# Patient Record
Sex: MALE | Race: WHITE | NOT HISPANIC OR LATINO | Employment: UNEMPLOYED | URBAN - NONMETROPOLITAN AREA
[De-identification: names, ages, dates, MRNs, and addresses within clinical notes are randomized per-mention and may not be internally consistent; named-entity substitution may affect disease eponyms.]

---

## 2021-01-15 ENCOUNTER — HOSPITAL ENCOUNTER (EMERGENCY)
Facility: HOSPITAL | Age: 36
Discharge: HOME | End: 2021-01-15
Attending: EMERGENCY MEDICINE
Payer: COMMERCIAL

## 2021-01-15 ENCOUNTER — APPOINTMENT (EMERGENCY)
Dept: RADIOLOGY | Facility: HOSPITAL | Age: 36
End: 2021-01-15
Payer: COMMERCIAL

## 2021-01-15 VITALS
HEART RATE: 69 BPM | TEMPERATURE: 98.6 F | WEIGHT: 192.02 LBS | SYSTOLIC BLOOD PRESSURE: 137 MMHG | RESPIRATION RATE: 18 BRPM | OXYGEN SATURATION: 96 % | DIASTOLIC BLOOD PRESSURE: 77 MMHG

## 2021-01-15 DIAGNOSIS — R56.9 SEIZURE (HCC): ICD-10-CM

## 2021-01-15 DIAGNOSIS — M54.6 THORACIC BACK PAIN, UNSPECIFIED BACK PAIN LATERALITY, UNSPECIFIED CHRONICITY: Primary | ICD-10-CM

## 2021-01-15 LAB
ALBUMIN SERPL BCP-MCNC: 4.1 G/DL (ref 3.5–5)
ALP SERPL-CCNC: 95 U/L (ref 46–116)
ALT SERPL W P-5'-P-CCNC: 30 U/L (ref 12–78)
AMPHETAMINES SERPL QL SCN: NEGATIVE
ANION GAP SERPL CALCULATED.3IONS-SCNC: 7 MMOL/L (ref 4–13)
AST SERPL W P-5'-P-CCNC: 20 U/L (ref 5–45)
BACTERIA UR QL AUTO: NORMAL /HPF
BARBITURATES UR QL: NEGATIVE
BASOPHILS # BLD AUTO: 0.1 THOUSANDS/ΜL (ref 0–0.1)
BASOPHILS NFR BLD AUTO: 1 % (ref 0–1)
BENZODIAZ UR QL: NEGATIVE
BILIRUB SERPL-MCNC: 0.54 MG/DL (ref 0.2–1)
BILIRUB UR QL STRIP: NEGATIVE
BUN SERPL-MCNC: 15 MG/DL (ref 5–25)
CALCIUM SERPL-MCNC: 8.9 MG/DL (ref 8.3–10.1)
CHLORIDE SERPL-SCNC: 101 MMOL/L (ref 100–108)
CLARITY UR: CLEAR
CO2 SERPL-SCNC: 30 MMOL/L (ref 21–32)
COCAINE UR QL: NEGATIVE
COLOR UR: YELLOW
CREAT SERPL-MCNC: 1.14 MG/DL (ref 0.6–1.3)
EOSINOPHIL # BLD AUTO: 0.16 THOUSAND/ΜL (ref 0–0.61)
EOSINOPHIL NFR BLD AUTO: 1 % (ref 0–6)
ERYTHROCYTE [DISTWIDTH] IN BLOOD BY AUTOMATED COUNT: 12.4 % (ref 11.6–15.1)
ETHANOL SERPL-MCNC: <3 MG/DL (ref 0–3)
GFR SERPL CREATININE-BSD FRML MDRD: 82 ML/MIN/1.73SQ M
GLUCOSE SERPL-MCNC: 88 MG/DL (ref 65–140)
GLUCOSE UR STRIP-MCNC: NEGATIVE MG/DL
HCT VFR BLD AUTO: 41.3 % (ref 36.5–49.3)
HGB BLD-MCNC: 14 G/DL (ref 12–17)
HGB UR QL STRIP.AUTO: ABNORMAL
IMM GRANULOCYTES # BLD AUTO: 0.04 THOUSAND/UL (ref 0–0.2)
IMM GRANULOCYTES NFR BLD AUTO: 0 % (ref 0–2)
KETONES UR STRIP-MCNC: NEGATIVE MG/DL
LEUKOCYTE ESTERASE UR QL STRIP: NEGATIVE
LYMPHOCYTES # BLD AUTO: 1.54 THOUSANDS/ΜL (ref 0.6–4.47)
LYMPHOCYTES NFR BLD AUTO: 13 % (ref 14–44)
MCH RBC QN AUTO: 29.5 PG (ref 26.8–34.3)
MCHC RBC AUTO-ENTMCNC: 33.9 G/DL (ref 31.4–37.4)
MCV RBC AUTO: 87 FL (ref 82–98)
METHADONE UR QL: NEGATIVE
MONOCYTES # BLD AUTO: 1.26 THOUSAND/ΜL (ref 0.17–1.22)
MONOCYTES NFR BLD AUTO: 10 % (ref 4–12)
NEUTROPHILS # BLD AUTO: 9.04 THOUSANDS/ΜL (ref 1.85–7.62)
NEUTS SEG NFR BLD AUTO: 75 % (ref 43–75)
NITRITE UR QL STRIP: NEGATIVE
NON-SQ EPI CELLS URNS QL MICRO: NORMAL /HPF
NRBC BLD AUTO-RTO: 0 /100 WBCS
OPIATES UR QL SCN: NEGATIVE
OXYCODONE+OXYMORPHONE UR QL SCN: NEGATIVE
PCP UR QL: NEGATIVE
PH UR STRIP.AUTO: 6.5 [PH]
PLATELET # BLD AUTO: 243 THOUSANDS/UL (ref 149–390)
PMV BLD AUTO: 9.5 FL (ref 8.9–12.7)
POTASSIUM SERPL-SCNC: 3.8 MMOL/L (ref 3.5–5.3)
PROT SERPL-MCNC: 7.4 G/DL (ref 6.4–8.2)
PROT UR STRIP-MCNC: NEGATIVE MG/DL
RBC # BLD AUTO: 4.74 MILLION/UL (ref 3.88–5.62)
RBC #/AREA URNS AUTO: NORMAL /HPF
SODIUM SERPL-SCNC: 138 MMOL/L (ref 136–145)
SP GR UR STRIP.AUTO: 1.01 (ref 1–1.03)
THC UR QL: POSITIVE
UROBILINOGEN UR QL STRIP.AUTO: 0.2 E.U./DL
WBC # BLD AUTO: 12.14 THOUSAND/UL (ref 4.31–10.16)
WBC #/AREA URNS AUTO: NORMAL /HPF

## 2021-01-15 PROCEDURE — 81001 URINALYSIS AUTO W/SCOPE: CPT | Performed by: PHYSICIAN ASSISTANT

## 2021-01-15 PROCEDURE — 80307 DRUG TEST PRSMV CHEM ANLYZR: CPT | Performed by: PHYSICIAN ASSISTANT

## 2021-01-15 PROCEDURE — 72100 X-RAY EXAM L-S SPINE 2/3 VWS: CPT

## 2021-01-15 PROCEDURE — 96365 THER/PROPH/DIAG IV INF INIT: CPT

## 2021-01-15 PROCEDURE — 96375 TX/PRO/DX INJ NEW DRUG ADDON: CPT

## 2021-01-15 PROCEDURE — 36415 COLL VENOUS BLD VENIPUNCTURE: CPT | Performed by: PHYSICIAN ASSISTANT

## 2021-01-15 PROCEDURE — 80053 COMPREHEN METABOLIC PANEL: CPT | Performed by: PHYSICIAN ASSISTANT

## 2021-01-15 PROCEDURE — 85025 COMPLETE CBC W/AUTO DIFF WBC: CPT | Performed by: PHYSICIAN ASSISTANT

## 2021-01-15 PROCEDURE — 72072 X-RAY EXAM THORAC SPINE 3VWS: CPT

## 2021-01-15 PROCEDURE — 82948 REAGENT STRIP/BLOOD GLUCOSE: CPT

## 2021-01-15 PROCEDURE — 99284 EMERGENCY DEPT VISIT MOD MDM: CPT

## 2021-01-15 PROCEDURE — 93005 ELECTROCARDIOGRAM TRACING: CPT

## 2021-01-15 PROCEDURE — 99285 EMERGENCY DEPT VISIT HI MDM: CPT | Performed by: PHYSICIAN ASSISTANT

## 2021-01-15 PROCEDURE — 82077 ASSAY SPEC XCP UR&BREATH IA: CPT | Performed by: PHYSICIAN ASSISTANT

## 2021-01-15 RX ORDER — LEVETIRACETAM 500 MG/1
500 TABLET ORAL EVERY 12 HOURS SCHEDULED
Qty: 60 TABLET | Refills: 0 | Status: SHIPPED | OUTPATIENT
Start: 2021-01-15 | End: 2021-02-24 | Stop reason: SDUPTHER

## 2021-01-15 RX ORDER — PREDNISONE 20 MG/1
40 TABLET ORAL ONCE
Status: COMPLETED | OUTPATIENT
Start: 2021-01-15 | End: 2021-01-15

## 2021-01-15 RX ORDER — KETOROLAC TROMETHAMINE 30 MG/ML
15 INJECTION, SOLUTION INTRAMUSCULAR; INTRAVENOUS ONCE
Status: COMPLETED | OUTPATIENT
Start: 2021-01-15 | End: 2021-01-15

## 2021-01-15 RX ORDER — DIAZEPAM 5 MG/1
5 TABLET ORAL ONCE
Status: DISCONTINUED | OUTPATIENT
Start: 2021-01-15 | End: 2021-01-16 | Stop reason: HOSPADM

## 2021-01-15 RX ORDER — METHYLPREDNISOLONE 4 MG/1
TABLET ORAL
Qty: 21 TABLET | Refills: 0 | Status: SHIPPED | OUTPATIENT
Start: 2021-01-15 | End: 2021-01-29 | Stop reason: ALTCHOICE

## 2021-01-15 RX ADMIN — LEVETIRACETAM 1000 MG: 500 INJECTION, SOLUTION, CONCENTRATE INTRAVENOUS at 21:43

## 2021-01-15 RX ADMIN — KETOROLAC TROMETHAMINE 15 MG: 30 INJECTION, SOLUTION INTRAMUSCULAR at 19:56

## 2021-01-15 RX ADMIN — PREDNISONE 40 MG: 20 TABLET ORAL at 22:13

## 2021-01-15 NOTE — Clinical Note
Estefania Shafer was seen and treated in our emergency department on 1/15/2021  Diagnosis:     Pj  may return to work on return date  He may return on this date: 01/22/2021         If you have any questions or concerns, please don't hesitate to call        Ann Desai, DO    ______________________________           _______________          _______________  Hospital Representative                              Date                                Time

## 2021-01-16 ENCOUNTER — TELEPHONE (OUTPATIENT)
Dept: EMERGENCY DEPT | Facility: HOSPITAL | Age: 36
End: 2021-01-16

## 2021-01-16 DIAGNOSIS — M54.6 THORACIC BACK PAIN, UNSPECIFIED BACK PAIN LATERALITY, UNSPECIFIED CHRONICITY: Primary | ICD-10-CM

## 2021-01-16 LAB — GLUCOSE SERPL-MCNC: 76 MG/DL (ref 65–140)

## 2021-01-16 NOTE — TELEPHONE ENCOUNTER
Patient returned phone call regarding thoracic spine x-rays  We discussed results and findings  MRI was recommended for follow-up  Will place orthopedic referral to assure patient gets appropriate follow-up  Patient was educated on all results and findings  He verbalized understanding  He agreed with this treatment plan

## 2021-01-16 NOTE — ED PROVIDER NOTES
History  Chief Complaint   Patient presents with    Back Pain     pt states back pain after seizure today  states hx of same and has been following up but pain increased after seizure today  pt denies hitting head during seizure, witnessed by family     18-year-old male presents the emergency department for evaluation of back pain  Patient states he 1st had a seizure in July reports he was seen in the hospital after this incident and was told it was likely due to dehydration  Patient states he had extreme back pain after this and has been following with family doctor reports he has had x-rays which were negative for acute findings and he has been referred to a rheumatologist whom he is seeing next week  Patient states while napping on the sofa today his children said he was shaking in his sleep  Patient concerned he had a seizure in his sleep  Reports he did bite his tongue during this time and reports his tongue was bleeding when he woke up  Denies falling off the sofa  Denies any head injury  Patient denies any drug or alcohol use  Patient denies any midline back tenderness reports pain is on the left side  He denies pain radiating into the leg  He denies any weakness, numbness, paresthesias  Patient denies any loss of bowel or bladder control  Patient denies any fevers or chills  He denies any IV drug use  History provided by:  Patient  Back Pain  Pain location: Left-sided    Quality:  Shooting  Radiates to:  Does not radiate  Pain severity:  Severe  Progression:  Worsening  Chronicity:  New  Context comment:  Suspected seizure  Ineffective treatments:  None tried  Associated symptoms: no abdominal pain, no abdominal swelling, no bladder incontinence, no bowel incontinence, no chest pain, no dysuria, no fever, no headaches, no leg pain, no numbness, no paresthesias, no pelvic pain, no perianal numbness, no tingling, no weakness and no weight loss        None       Past Medical History: Diagnosis Date    Migraine        History reviewed  No pertinent surgical history  History reviewed  No pertinent family history  I have reviewed and agree with the history as documented  E-Cigarette/Vaping    E-Cigarette Use Never User      E-Cigarette/Vaping Substances     Social History     Tobacco Use    Smoking status: Never Smoker    Smokeless tobacco: Never Used   Substance Use Topics    Alcohol use: Yes     Frequency: Monthly or less     Drinks per session: 1 or 2    Drug use: Yes     Types: Marijuana       Review of Systems   Constitutional: Negative  Negative for appetite change, chills, diaphoresis, fatigue, fever and weight loss  HENT: Negative  Eyes: Negative for visual disturbance  Respiratory: Negative  Negative for cough, choking, chest tightness, shortness of breath, wheezing and stridor  Cardiovascular: Negative for chest pain  Gastrointestinal: Negative for abdominal pain and bowel incontinence  Genitourinary: Negative for bladder incontinence, dysuria and pelvic pain  Musculoskeletal: Positive for back pain  Negative for arthralgias, gait problem, joint swelling, myalgias, neck pain and neck stiffness  Skin: Negative  Negative for color change, pallor, rash and wound  Neurological: Positive for seizures  Negative for dizziness, tingling, tremors, syncope, facial asymmetry, speech difficulty, weakness, light-headedness, numbness, headaches and paresthesias  Psychiatric/Behavioral: Negative  All other systems reviewed and are negative  Physical Exam  Physical Exam  Vitals signs and nursing note reviewed  Constitutional:       General: He is not in acute distress  Appearance: Normal appearance  He is normal weight  He is not ill-appearing, toxic-appearing or diaphoretic  HENT:      Head: Normocephalic and atraumatic        Right Ear: Tympanic membrane, ear canal and external ear normal       Left Ear: Tympanic membrane, ear canal and external ear normal       Nose: Nose normal  No congestion or rhinorrhea  Mouth/Throat:      Mouth: Mucous membranes are moist       Pharynx: Oropharynx is clear  No oropharyngeal exudate or posterior oropharyngeal erythema  Comments: Small wound to the left side of the tongue  Eyes:      Extraocular Movements: Extraocular movements intact  Conjunctiva/sclera: Conjunctivae normal       Pupils: Pupils are equal, round, and reactive to light  Neck:      Musculoskeletal: Normal range of motion and neck supple  No muscular tenderness  Cardiovascular:      Rate and Rhythm: Normal rate and regular rhythm  Heart sounds: No murmur  Pulmonary:      Effort: Pulmonary effort is normal  No respiratory distress  Breath sounds: Normal breath sounds  No stridor  No wheezing, rhonchi or rales  Chest:      Chest wall: No tenderness  Abdominal:      General: Abdomen is flat  Bowel sounds are normal  There is no distension  Palpations: Abdomen is soft  Tenderness: There is no abdominal tenderness  There is no right CVA tenderness, left CVA tenderness or guarding  Musculoskeletal: Normal range of motion  General: No swelling, tenderness or deformity  Right lower leg: No edema  Left lower leg: No edema  Skin:     General: Skin is warm and dry  Capillary Refill: Capillary refill takes less than 2 seconds  Neurological:      General: No focal deficit present  Mental Status: He is alert and oriented to person, place, and time  GCS: GCS eye subscore is 4  GCS verbal subscore is 5  GCS motor subscore is 6  Cranial Nerves: Cranial nerves are intact  Sensory: Sensation is intact  Motor: Motor function is intact  No weakness, tremor, atrophy or pronator drift  Coordination: Coordination is intact  Finger-Nose-Finger Test normal       Gait: Gait is intact     Psychiatric:         Mood and Affect: Mood normal          Behavior: Behavior normal  Thought Content:  Thought content normal          Judgment: Judgment normal          Vital Signs  ED Triage Vitals   Temperature Pulse Respirations Blood Pressure SpO2   01/15/21 1916 01/15/21 1916 01/15/21 1916 01/15/21 1916 01/15/21 1916   98 6 °F (37 °C) 74 16 136/79 95 %      Temp Source Heart Rate Source Patient Position - Orthostatic VS BP Location FiO2 (%)   01/15/21 1916 01/15/21 1916 01/15/21 1916 01/15/21 1916 --   Temporal Monitor Lying Right arm       Pain Score       01/15/21 1956       7           Vitals:    01/15/21 1916   BP: 136/79   Pulse: 74   Patient Position - Orthostatic VS: Lying         Visual Acuity      ED Medications  Medications   diazepam (VALIUM) tablet 5 mg (5 mg Oral Not Given 1/15/21 1955)   ketorolac (TORADOL) injection 15 mg (15 mg Intravenous Given 1/15/21 1956)       Diagnostic Studies  Results Reviewed     Procedure Component Value Units Date/Time    UA (URINE) with reflex to Scope [[id number]]     Lab Status: No result Specimen: Urine     Ethanol [[id number]]  (Normal) Collected: 01/15/21 1955    Lab Status: Final result Specimen: Blood from Arm, Left Updated: 01/15/21 2013     Ethanol Lvl <3 mg/dL     CBC and differential [[id number]]  (Abnormal) Collected: 01/15/21 1955    Lab Status: Final result Specimen: Blood from Arm, Left Updated: 01/15/21 2003     WBC 12 14 Thousand/uL      RBC 4 74 Million/uL      Hemoglobin 14 0 g/dL      Hematocrit 41 3 %      MCV 87 fL      MCH 29 5 pg      MCHC 33 9 g/dL      RDW 12 4 %      MPV 9 5 fL      Platelets 296 Thousands/uL      nRBC 0 /100 WBCs      Neutrophils Relative 75 %      Immat GRANS % 0 %      Lymphocytes Relative 13 %      Monocytes Relative 10 %      Eosinophils Relative 1 %      Basophils Relative 1 %      Neutrophils Absolute 9 04 Thousands/µL      Immature Grans Absolute 0 04 Thousand/uL      Lymphocytes Absolute 1 54 Thousands/µL      Monocytes Absolute 1 26 Thousand/µL      Eosinophils Absolute 0 16 Thousand/µL Basophils Absolute 0 10 Thousands/µL     Comprehensive metabolic panel [[id number]] Collected: 01/15/21 1955    Lab Status: In process Specimen: Blood from Arm, Left Updated: 01/15/21 2000    Rapid drug screen, urine [[id number]]     Lab Status: No result Specimen: Urine                  No orders to display              Procedures  ECG 12 Lead Documentation Only    Date/Time: 1/15/2021 8:06 PM  Performed by: Nelda Jefferson PA-C  Authorized by: Nelda Jefferson PA-C     Indications / Diagnosis:  Seizure  Patient location:  ED  Interpretation:     Interpretation: non-specific    Rate:     ECG rate:  66    ECG rate assessment: normal    Rhythm:     Rhythm: sinus rhythm    Ectopy:     Ectopy: none    QRS:     QRS axis:  Normal    QRS intervals:  Normal  Conduction:     Conduction: normal    ST segments:     ST segments:  Normal             ED Course  ED Course as of David 15 2016   Fri David 15, 2021   2005 Noted leukocytosis   WBC(!): 12 14   2013 Ethanol negative      2013 Treating patient's back pain with Valium and Toradol  Patient signed out to Dr Franklin Ross pending remainder of laboratory findings              MDM  Number of Diagnoses or Management Options  Diagnosis management comments: 48-year-old male presents emergency department for evaluation of left-sided back pain after suspected seizure today  Vitals and medical record reviewed  On exam patient had left-sided back tenderness  No red flag symptoms  No loss of bowel or bladder control, fevers, chills  Patient denies any IV drug use  Laboratory findings so far show mild leukocytosis  Ethanol negative    I discussed and reviewed chart with Dr Jaren Brar who will take over patient's care pending remainder of laboratory findings       Amount and/or Complexity of Data Reviewed  Clinical lab tests: ordered and reviewed        Disposition  Final diagnoses:   None     ED Disposition     None      Follow-up Information    None         Patient's Medications    No medications on file     No discharge procedures on file      PDMP Review     None          ED Provider  Electronically Signed by           Seema Sandoval PA-C  01/15/21 2016

## 2021-01-17 NOTE — ED ATTENDING ATTESTATION
1/15/2021  INitza DO, saw and evaluated the patient  I have discussed the patient with the resident/non-physician practitioner and agree with the resident's/non-physician practitioner's findings, Plan of Care, and MDM as documented in the resident's/non-physician practitioner's note, except where noted  All available labs and Radiology studies were reviewed  I was present for key portions of any procedure(s) performed by the resident/non-physician practitioner and I was immediately available to provide assistance  At this point I agree with the current assessment done in the Emergency Department    I have conducted an independent evaluation of this patient a history and physical is as follows:    ED Course  ED Course as of David 16 2158   Fri David 15, 2021   2147 Notified by nursing staff that patient declined Valium        Patient complaint is back pain, imaging findings consistent with degenerative changes as well as old appearing compression fractures in the midthoracic region, he has a initial evaluation with rheumatology which was referred from his PCP, however think patient would benefit from follow-up with pain management/spine and possible Neurology secondary to possible seizure activity today, he was agreeable to starting a course of [address] for treatment of possible seizures and prednisone for treatment of back pain, he was advised to follow back up with his primary care provider or return if symptoms worsen, patient acknowledges understanding and agreement with this plan

## 2021-01-19 RX ORDER — DIAZEPAM 5 MG/1
5 TABLET ORAL
COMMUNITY
Start: 2020-12-05 | End: 2021-01-29 | Stop reason: ALTCHOICE

## 2021-01-20 ENCOUNTER — CONSULT (OUTPATIENT)
Dept: PAIN MEDICINE | Facility: CLINIC | Age: 36
End: 2021-01-20
Payer: COMMERCIAL

## 2021-01-20 VITALS
RESPIRATION RATE: 20 BRPM | SYSTOLIC BLOOD PRESSURE: 120 MMHG | HEIGHT: 68 IN | TEMPERATURE: 97.5 F | DIASTOLIC BLOOD PRESSURE: 70 MMHG | HEART RATE: 63 BPM | BODY MASS INDEX: 27.58 KG/M2 | WEIGHT: 182 LBS

## 2021-01-20 DIAGNOSIS — M54.6 THORACIC BACK PAIN, UNSPECIFIED BACK PAIN LATERALITY, UNSPECIFIED CHRONICITY: ICD-10-CM

## 2021-01-20 DIAGNOSIS — S22.000G THORACIC COMPRESSION FRACTURE, WITH DELAYED HEALING, SUBSEQUENT ENCOUNTER: Primary | ICD-10-CM

## 2021-01-20 PROBLEM — M43.9 COMPRESSION DEFORMITY OF VERTEBRA: Status: ACTIVE | Noted: 2021-01-20

## 2021-01-20 PROCEDURE — 99244 OFF/OP CNSLTJ NEW/EST MOD 40: CPT | Performed by: ANESTHESIOLOGY

## 2021-01-20 RX ORDER — CELECOXIB 200 MG/1
200 CAPSULE ORAL 2 TIMES DAILY
Qty: 60 CAPSULE | Refills: 0 | Status: SHIPPED | OUTPATIENT
Start: 2021-01-20 | End: 2021-02-13

## 2021-01-20 RX ORDER — LIDOCAINE HYDROCHLORIDE 10 MG/ML
10 INJECTION, SOLUTION EPIDURAL; INFILTRATION; INTRACAUDAL; PERINEURAL ONCE
Status: CANCELLED | OUTPATIENT
Start: 2021-01-20 | End: 2021-01-20

## 2021-01-20 RX ORDER — BUPIVACAINE HCL/PF 2.5 MG/ML
10 VIAL (ML) INJECTION ONCE
Status: CANCELLED | OUTPATIENT
Start: 2021-01-20 | End: 2021-01-20

## 2021-01-20 RX ORDER — METHYLPREDNISOLONE ACETATE 80 MG/ML
80 INJECTION, SUSPENSION INTRA-ARTICULAR; INTRALESIONAL; INTRAMUSCULAR; PARENTERAL; SOFT TISSUE ONCE
Status: CANCELLED | OUTPATIENT
Start: 2021-01-20 | End: 2021-01-20

## 2021-01-20 NOTE — PROGRESS NOTES
Assessment  1  Thoracic compression fracture, with delayed healing, subsequent encounter - Bilateral  -     Case request operating room: BLOCK MEDIAL BRANCH T7, T8, T9, 10; Standing  -     Case request operating room: BLOCK MEDIAL BRANCH T7, T8, T9, 10  -     Ambulatory referral to Physical Therapy; Future    2  Thoracic back pain, unspecified back pain laterality, unspecified chronicity - Bilateral  -     Ambulatory referral to Pain Management  -     celecoxib (CeleBREX) 200 mg capsule; Take 1 capsule (200 mg total) by mouth 2 (two) times a day  -     Case request operating room: BLOCK MEDIAL BRANCH T7, T8, T9, 10; Standing  -     Case request operating room: BLOCK MEDIAL BRANCH T7, T8, T9, 10  -     Ambulatory referral to Physical Therapy; Future    Chronic axial mid back pain described primarily arthritic features with minimal intercostal nerve radiation  X-ray shows a slight compression deformity of T8 and T10 vertebral bodies  History consistent with chronic compression deformity as patient reports having fallen secondary to seizure 7 months ago  He has not trialed conservative therapies including physical therapy or other NSAIDs other than ibuprofen  Reasonable at this time to employ multimodal pain therapy approach in treating this patient's pain  Plan  -bilateral T7, T8, T9 and T10 medial branch blocks  -PT for compression deformity for paraspinal muscle strengthening  -Celebrex 200 mg b i d  Prescribed  Patient educated not to take other NSAIDs with this medication as well as bleeding complications  There are risks associated with opioid medications, including dependence, addiction and tolerance  The patient understands and agrees to use these medications only as prescribed  Potential side effects of the medications include, but are not limited to, constipation, drowsiness, addiction, impaired judgment and risk of fatal overdose if not taken as prescribed   The patient was warned against driving while taking sedation medications  Sharing medications is a felony  At this point in time, the patient is showing no signs of addiction, abuse, diversion or suicidal ideation  [place] Prescription Drug Monitoring Program report was reviewed and was appropriate     Complete risks and benefits including bleeding, infection, tissue reaction, nerve injury and allergic reaction were discussed  The approach was demonstrated using models and literature was provided  Verbal and written consent was obtained  My impressions and treatment recommendations were discussed in detail with the patient who verbalized understanding and had no further questions  Discharge instructions were provided  I personally saw and examined the patient and I agree with the above discussed plan of care  New Medications Ordered This Visit   Medications    Acetaminophen (Tylenol) 325 MG CAPS     Sig: Take 325 mg by mouth    diazepam (VALIUM) 5 mg tablet     Si mg    celecoxib (CeleBREX) 200 mg capsule     Sig: Take 1 capsule (200 mg total) by mouth 2 (two) times a day     Dispense:  60 capsule     Refill:  0       History of Present Illness    Ina Moy is a 38 y o  male with past medical history of seizure disorder on Keppra and diazepam, presenting with axial mid back pain for the better part of 7 months  He reported reports having had a seizure and falling 7 months ago and since having intense midback pain that is described by primarily aching, nagging, indolent, stabbing features  Sneezing and coughing as well as walking or extending his back seem to exacerbate the pain  He describes 8/10 consistent pain isolated to the T8 and T10 regions of his midback with minimal radiation into the intercostal nerve distribution bilaterally  The pain significantly compromises independent activities of daily living and overall function    He has not yet participated in physical therapy and otherwise has been taking Tylenol and ibuprofen with modest benefit  He has not trialed interventional procedures including medial branch blocks or epidural steroid injections in the past   He denies any weakness numbness or paresthesias or any bowel or bladder abnormality secondary to this pain  I have personally reviewed and/or updated the patient's past medical history, past surgical history, family history, social history, current medications, allergies, and vital signs today  Review of Systems   Constitutional: Positive for activity change  HENT: Negative  Eyes: Negative  Respiratory: Negative  Cardiovascular: Negative  Gastrointestinal: Negative  Endocrine: Negative  Genitourinary: Negative  Musculoskeletal: Positive for arthralgias, back pain and myalgias  Skin: Negative  Allergic/Immunologic: Negative  Hematological: Negative  Psychiatric/Behavioral: Negative  All other systems reviewed and are negative  Patient Active Problem List   Diagnosis    Thoracic compression fracture, with delayed healing, subsequent encounter    Thoracic back pain       Past Medical History:   Diagnosis Date    [facility]    GERD (gastroesophageal reflux disease)     Migraine     Seizures ([facility] )        History reviewed  No pertinent surgical history  History reviewed  No pertinent family history      Social History     Occupational History    Not on file   Tobacco Use    Smoking status: Never Smoker    Smokeless tobacco: Never Used   Substance and Sexual Activity    Alcohol use: Yes     Frequency: Monthly or less     Drinks per session: 1 or 2    Drug use: Yes     Types: Marijuana    Sexual activity: Not on file       Current Outpatient Medications on File Prior to Visit   Medication Sig    Acetaminophen (Tylenol) 325 MG CAPS Take 325 mg by mouth    levETIRAcetam (KEPPRA) 500 mg tablet Take 1 tablet (500 mg total) by mouth every 12 (twelve) hours    methylPREDNISolone 4 MG tablet therapy pack Use as directed on package    diazepam (VALIUM) 5 mg tablet 5 mg     No current facility-administered medications on file prior to visit  Allergies   Allergen Reactions    Sumatriptan Rash     Burning sensation   skin gets warm  skin gets warm           Physical Exam    /70 (BP Location: Right arm, Patient Position: Sitting, Cuff Size: Standard)   Pulse 63   Temp 97 5 °F (36 4 °C) (Temporal)   Resp 20   Ht 5' 8 25" (1 734 m)   Wt 82 6 kg (182 lb)   BMI 27 47 kg/m²     Constitutional: normal, well developed, well nourished, alert, in no distress and non-toxic and no overt pain behavior  Eyes: anicteric  HEENT: grossly intact  Neck: supple, symmetric, trachea midline and no masses   Pulmonary:even and unlabored  Cardiovascular:No edema or pitting edema present  Skin:Normal without rashes or lesions and well hydrated  Psychiatric:Mood and affect appropriate  Neurologic:Cranial Nerves II-XII grossly intact Sensation grossly intact; no clonus negative griffiths's  Reflexes 2+ and brisk  SLR negative bilaterally  Musculoskeletal:normal gait  Normal heel toe and tip toe walking  5/5 strength in all active range of motion movements bilaterally  Significant pain with thoracic facet loading bilaterally and with lateral spine rotation  TTP over thoracic paraspinal muscles around areas of T8 and T10 vertebral body  Tenderness to palpation over spinous processes as well  Negative magdy's test, negative gaenslen's negative SIJ loading bilaterally  Imaging    THORACIC SPINE     INDICATION:   back pain      COMPARISON:  None     VIEWS:  XR SPINE THORACIC 3 VW  Images: 4     FINDINGS:     Slight loss in axial height with slight anterior wedge deformity of T10 and T8 of unknown age      Otherwise normal thoracic kyphosis    No significant scoliosis      No significant degenerative changes       Visualized lungs are clear       The pedicles appear intact         IMPRESSION:  Very slight anterior wedge deformities of T8 and T10 of unknown age  Consider MRI if an acute fractures suspected clinically

## 2021-01-20 NOTE — H&P (VIEW-ONLY)
Assessment  1  Thoracic compression fracture, with delayed healing, subsequent encounter - Bilateral  -     Case request operating room: BLOCK MEDIAL BRANCH T7, T8, T9, 10; Standing  -     Case request operating room: BLOCK MEDIAL BRANCH T7, T8, T9, 10  -     Ambulatory referral to Physical Therapy; Future    2  Thoracic back pain, unspecified back pain laterality, unspecified chronicity - Bilateral  -     Ambulatory referral to Pain Management  -     celecoxib (CeleBREX) 200 mg capsule; Take 1 capsule (200 mg total) by mouth 2 (two) times a day  -     Case request operating room: BLOCK MEDIAL BRANCH T7, T8, T9, 10; Standing  -     Case request operating room: BLOCK MEDIAL BRANCH T7, T8, T9, 10  -     Ambulatory referral to Physical Therapy; Future    Chronic axial mid back pain described primarily arthritic features with minimal intercostal nerve radiation  X-ray shows a slight compression deformity of T8 and T10 vertebral bodies  History consistent with chronic compression deformity as patient reports having fallen secondary to seizure 7 months ago  He has not trialed conservative therapies including physical therapy or other NSAIDs other than ibuprofen  Reasonable at this time to employ multimodal pain therapy approach in treating this patient's pain  Plan  -bilateral T7, T8, T9 and T10 medial branch blocks  -PT for compression deformity for paraspinal muscle strengthening  -Celebrex 200 mg b i d  Prescribed  Patient educated not to take other NSAIDs with this medication as well as bleeding complications  There are risks associated with opioid medications, including dependence, addiction and tolerance  The patient understands and agrees to use these medications only as prescribed  Potential side effects of the medications include, but are not limited to, constipation, drowsiness, addiction, impaired judgment and risk of fatal overdose if not taken as prescribed   The patient was warned against driving while taking sedation medications  Sharing medications is a felony  At this point in time, the patient is showing no signs of addiction, abuse, diversion or suicidal ideation  [place] Prescription Drug Monitoring Program report was reviewed and was appropriate     Complete risks and benefits including bleeding, infection, tissue reaction, nerve injury and allergic reaction were discussed  The approach was demonstrated using models and literature was provided  Verbal and written consent was obtained  My impressions and treatment recommendations were discussed in detail with the patient who verbalized understanding and had no further questions  Discharge instructions were provided  I personally saw and examined the patient and I agree with the above discussed plan of care  New Medications Ordered This Visit   Medications    Acetaminophen (Tylenol) 325 MG CAPS     Sig: Take 325 mg by mouth    diazepam (VALIUM) 5 mg tablet     Si mg    celecoxib (CeleBREX) 200 mg capsule     Sig: Take 1 capsule (200 mg total) by mouth 2 (two) times a day     Dispense:  60 capsule     Refill:  0       History of Present Illness    Fany Jorgensen is a 38 y o  male with past medical history of seizure disorder on Keppra and diazepam, presenting with axial mid back pain for the better part of 7 months  He reported reports having had a seizure and falling 7 months ago and since having intense midback pain that is described by primarily aching, nagging, indolent, stabbing features  Sneezing and coughing as well as walking or extending his back seem to exacerbate the pain  He describes 8/10 consistent pain isolated to the T8 and T10 regions of his midback with minimal radiation into the intercostal nerve distribution bilaterally  The pain significantly compromises independent activities of daily living and overall function    He has not yet participated in physical therapy and otherwise has been taking Tylenol and ibuprofen with modest benefit  He has not trialed interventional procedures including medial branch blocks or epidural steroid injections in the past   He denies any weakness numbness or paresthesias or any bowel or bladder abnormality secondary to this pain  I have personally reviewed and/or updated the patient's past medical history, past surgical history, family history, social history, current medications, allergies, and vital signs today  Review of Systems   Constitutional: Positive for activity change  HENT: Negative  Eyes: Negative  Respiratory: Negative  Cardiovascular: Negative  Gastrointestinal: Negative  Endocrine: Negative  Genitourinary: Negative  Musculoskeletal: Positive for arthralgias, back pain and myalgias  Skin: Negative  Allergic/Immunologic: Negative  Hematological: Negative  Psychiatric/Behavioral: Negative  All other systems reviewed and are negative  Patient Active Problem List   Diagnosis    Thoracic compression fracture, with delayed healing, subsequent encounter    Thoracic back pain       Past Medical History:   Diagnosis Date    [facility] 2012    GERD (gastroesophageal reflux disease)     Migraine     [facility]ures ([facility] )        History reviewed  No pertinent surgical history  History reviewed  No pertinent family history      Social History     Occupational History    Not on file   Tobacco Use    Smoking status: Never Smoker    Smokeless tobacco: Never Used   Substance and Sexual Activity    Alcohol use: Yes     Frequency: Monthly or less     Drinks per session: 1 or 2    Drug use: Yes     Types: Marijuana    Sexual activity: Not on file       Current Outpatient Medications on File Prior to Visit   Medication Sig    Acetaminophen (Tylenol) 325 MG CAPS Take 325 mg by mouth    levETIRAcetam (KEPPRA) 500 mg tablet Take 1 tablet (500 mg total) by mouth every 12 (twelve) hours    methylPREDNISolone 4 MG tablet therapy pack Use as directed on package    diazepam (VALIUM) 5 mg tablet 5 mg     No current facility-administered medications on file prior to visit  Allergies   Allergen Reactions    Sumatriptan Rash     Burning sensation   skin gets warm  skin gets warm           Physical Exam    /70 (BP Location: Right arm, Patient Position: Sitting, Cuff Size: Standard)   Pulse 63   Temp 97 5 °F (36 4 °C) (Temporal)   Resp 20   Ht 5' 8 25" (1 734 m)   Wt 82 6 kg (182 lb)   BMI 27 47 kg/m²     Constitutional: normal, well developed, well nourished, alert, in no distress and non-toxic and no overt pain behavior  Eyes: anicteric  HEENT: grossly intact  Neck: supple, symmetric, trachea midline and no masses   Pulmonary:even and unlabored  Cardiovascular:No edema or pitting edema present  Skin:Normal without rashes or lesions and well hydrated  Psychiatric:Mood and affect appropriate  Neurologic:Cranial Nerves II-XII grossly intact Sensation grossly intact; no clonus negative griffiths's  Reflexes 2+ and brisk  SLR negative bilaterally  Musculoskeletal:normal gait  Normal heel toe and tip toe walking  5/5 strength in all active range of motion movements bilaterally  Significant pain with thoracic facet loading bilaterally and with lateral spine rotation  TTP over thoracic paraspinal muscles around areas of T8 and T10 vertebral body  Tenderness to palpation over spinous processes as well  Negative magdy's test, negative gaenslen's negative SIJ loading bilaterally  Imaging    THORACIC SPINE     INDICATION:   back pain      COMPARISON:  None     VIEWS:  XR SPINE THORACIC 3 VW  Images: 4     FINDINGS:     Slight loss in axial height with slight anterior wedge deformity of T10 and T8 of unknown age      Otherwise normal thoracic kyphosis    No significant scoliosis      No significant degenerative changes       Visualized lungs are clear       The pedicles appear intact         IMPRESSION:  Very slight anterior wedge deformities of T8 and T10 of unknown age  Consider MRI if an acute fractures suspected clinically

## 2021-01-20 NOTE — LETTER
January 20, 2021     Valerio Musa DO  [address] [id number]    Patient: Fany Jorgensen   YOB: 1985   Date of Visit: 1/20/2021       Dear Dr Andrea Bautista:    Thank you for referring Fany Jorgensen to me for evaluation  Below are my notes for this consultation  If you have questions, please do not hesitate to call me  I look forward to following your patient along with you  Sincerely,        Sydnee Smith MD        CC: No Recipients  Sydnee Smith MD  1/20/2021 11:13 AM  Signed      Assessment  1  Thoracic compression fracture, with delayed healing, subsequent encounter - Bilateral  -     Case request operating room: BLOCK MEDIAL BRANCH T7, T8, T9, 10; Standing  -     Case request operating room: BLOCK MEDIAL BRANCH T7, T8, T9, 10  -     Ambulatory referral to Physical Therapy; Future    2  Thoracic back pain, unspecified back pain laterality, unspecified chronicity - Bilateral  -     Ambulatory referral to Pain Management  -     celecoxib (CeleBREX) 200 mg capsule; Take 1 capsule (200 mg total) by mouth 2 (two) times a day  -     Case request operating room: BLOCK MEDIAL BRANCH T7, T8, T9, 10; Standing  -     Case request operating room: BLOCK MEDIAL BRANCH T7, T8, T9, 10  -     Ambulatory referral to Physical Therapy; Future    Chronic axial mid back pain described primarily arthritic features with minimal intercostal nerve radiation  X-ray shows a slight compression deformity of T8 and T10 vertebral bodies  History consistent with chronic compression deformity as patient reports having fallen secondary to seizure 7 months ago  He has not trialed conservative therapies including physical therapy or other NSAIDs other than ibuprofen  Reasonable at this time to employ multimodal pain therapy approach in treating this patient's pain      Plan  -bilateral T7, T8, T9 and T10 medial branch blocks  -PT for compression deformity for paraspinal muscle strengthening  -Celebrex 200 mg b i d  Prescribed  Patient educated not to take other NSAIDs with this medication as well as bleeding complications  There are risks associated with opioid medications, including dependence, addiction and tolerance  The patient understands and agrees to use these medications only as prescribed  Potential side effects of the medications include, but are not limited to, constipation, drowsiness, addiction, impaired judgment and risk of fatal overdose if not taken as prescribed  The patient was warned against driving while taking sedation medications  Sharing medications is a felony  At this point in time, the patient is showing no signs of addiction, abuse, diversion or suicidal ideation  [place] Prescription Drug Monitoring Program report was reviewed and was appropriate     Complete risks and benefits including bleeding, infection, tissue reaction, nerve injury and allergic reaction were discussed  The approach was demonstrated using models and literature was provided  Verbal and written consent was obtained  My impressions and treatment recommendations were discussed in detail with the patient who verbalized understanding and had no further questions  Discharge instructions were provided  I personally saw and examined the patient and I agree with the above discussed plan of care  New Medications Ordered This Visit   Medications    Acetaminophen (Tylenol) 325 MG CAPS     Sig: Take 325 mg by mouth    diazepam (VALIUM) 5 mg tablet     Si mg    celecoxib (CeleBREX) 200 mg capsule     Sig: Take 1 capsule (200 mg total) by mouth 2 (two) times a day     Dispense:  60 capsule     Refill:  0       History of Present Illness    Higinio Cano is a 38 y o  male with past medical history of seizure disorder on Keppra and diazepam, presenting with axial mid back pain for the better part of 7 months    He reported reports having had a seizure and falling 7 months ago and since having intense midback pain that is described by primarily aching, nagging, indolent, stabbing features  Sneezing and coughing as well as walking or extending his back seem to exacerbate the pain  He describes 8/10 consistent pain isolated to the T8 and T10 regions of his midback with minimal radiation into the intercostal nerve distribution bilaterally  The pain significantly compromises independent activities of daily living and overall function  He has not yet participated in physical therapy and otherwise has been taking Tylenol and ibuprofen with modest benefit  He has not trialed interventional procedures including medial branch blocks or epidural steroid injections in the past   He denies any weakness numbness or paresthesias or any bowel or bladder abnormality secondary to this pain  I have personally reviewed and/or updated the patient's past medical history, past surgical history, family history, social history, current medications, allergies, and vital signs today  Review of Systems   Constitutional: Positive for activity change  HENT: Negative  Eyes: Negative  Respiratory: Negative  Cardiovascular: Negative  Gastrointestinal: Negative  Endocrine: Negative  Genitourinary: Negative  Musculoskeletal: Positive for arthralgias, back pain and myalgias  Skin: Negative  Allergic/Immunologic: Negative  Hematological: Negative  Psychiatric/Behavioral: Negative  All other systems reviewed and are negative  Patient Active Problem List   Diagnosis    Thoracic compression fracture, with delayed healing, subsequent encounter    Thoracic back pain       Past Medical History:   Diagnosis Date    [facility] ) 2012    GERD (gastroesophageal reflux disease)     Migraine     Seizures ([facility] )        History reviewed  No pertinent surgical history  History reviewed  No pertinent family history      Social History     Occupational History    Not on file Tobacco Use    Smoking status: Never Smoker    Smokeless tobacco: Never Used   Substance and Sexual Activity    Alcohol use: Yes     Frequency: Monthly or less     Drinks per session: 1 or 2    Drug use: Yes     Types: Marijuana    Sexual activity: Not on file       Current Outpatient Medications on File Prior to Visit   Medication Sig    Acetaminophen (Tylenol) 325 MG CAPS Take 325 mg by mouth    levETIRAcetam (KEPPRA) 500 mg tablet Take 1 tablet (500 mg total) by mouth every 12 (twelve) hours    methylPREDNISolone 4 MG tablet therapy pack Use as directed on package    diazepam (VALIUM) 5 mg tablet 5 mg     No current facility-administered medications on file prior to visit  Allergies   Allergen Reactions    Sumatriptan Rash     Burning sensation   skin gets warm  skin gets warm           Physical Exam    /70 (BP Location: Right arm, Patient Position: Sitting, Cuff Size: Standard)   Pulse 63   Temp 97 5 °F (36 4 °C) (Temporal)   Resp 20   Ht 5' 8 25" (1 734 m)   Wt 82 6 kg (182 lb)   BMI 27 47 kg/m²     Constitutional: normal, well developed, well nourished, alert, in no distress and non-toxic and no overt pain behavior  Eyes: anicteric  HEENT: grossly intact  Neck: supple, symmetric, trachea midline and no masses   Pulmonary:even and unlabored  Cardiovascular:No edema or pitting edema present  Skin:Normal without rashes or lesions and well hydrated  Psychiatric:Mood and affect appropriate  Neurologic:Cranial Nerves II-XII grossly intact Sensation grossly intact; no clonus negative griffiths's  Reflexes 2+ and brisk  SLR negative bilaterally  Musculoskeletal:normal gait  Normal heel toe and tip toe walking  5/5 strength in all active range of motion movements bilaterally  Significant pain with thoracic facet loading bilaterally and with lateral spine rotation  TTP over thoracic paraspinal muscles around areas of T8 and T10 vertebral body    Tenderness to palpation over spinous processes as well  Negative magdy's test, negative gaenslen's negative SIJ loading bilaterally  Imaging    THORACIC SPINE     INDICATION:   back pain      COMPARISON:  None     VIEWS:  XR SPINE THORACIC 3 VW  Images: 4     FINDINGS:     Slight loss in axial height with slight anterior wedge deformity of T10 and T8 of unknown age      Otherwise normal thoracic kyphosis  No significant scoliosis      No significant degenerative changes       Visualized lungs are clear       The pedicles appear intact         IMPRESSION:  Very slight anterior wedge deformities of T8 and T10 of unknown age  Consider MRI if an acute fractures suspected clinically

## 2021-01-20 NOTE — PATIENT INSTRUCTIONS
Vertebral Compression Fracture   WHAT YOU NEED TO KNOW:   A vertebral compression fracture (VCF) is a collapse or breakdown in a bone in your spine  Compression fractures happen when there is too much pressure on the vertebra  VCFs most often occur in the thoracic (middle) and lumbar (lower) areas of your spine  Fractures may be mild to severe  DISCHARGE INSTRUCTIONS:   Call your local emergency number (911 in the [address] [facility]) if:   · You feel lightheaded, short of breath, and have chest pain  · You cough up blood  · You suddenly cannot feel your legs  · You suddenly have trouble moving your arms or legs  Return to the emergency department if:   · Your arm or leg feels warm, tender, and painful  It may look swollen and red  · You have new problems urinating or having bowel movements  · You have severe pain in your back after falling, bending forward, sneezing, or coughing strongly  Call your doctor or orthopedist if:   · You cannot sleep or rest because of back pain  · You have pain or swelling in your back that is getting worse, or does not go away  · You have questions or concerns about your condition or care  Medicines: You may need any of the following:  · NSAIDs , such as ibuprofen, help decrease swelling, pain, and fever  This medicine is available with or without a doctor's order  NSAIDs can cause stomach bleeding or kidney problems in certain people  If you take blood thinner medicine, always ask if NSAIDs are safe for you  Always read the medicine label and follow directions  Do not give these medicines to children under 10months of age without direction from your child's healthcare provider  · Acetaminophen  decreases pain and fever  It is available without a doctor's order  Ask how much to take and how often to take it  Follow directions   Read the labels of all other medicines you are using to see if they also contain acetaminophen, or ask your doctor or pharmacist  Acetaminophen can cause liver damage if not taken correctly  Do not use more than 4 grams (4,000 milligrams) total of acetaminophen in one day  · Prescription pain medicine  may be given  Ask your healthcare provider how to take this medicine safely  Some prescription pain medicines contain acetaminophen  Do not take other medicines that contain acetaminophen without talking to your healthcare provider  Too much acetaminophen may cause liver damage  Prescription pain medicine may cause constipation  Ask your healthcare provider how to prevent or treat constipation  · Bisphosphonates and calcitonin  may be recommended to help your bones get stronger  They can decrease the pain of a VCF caused by osteoporosis, and decrease your risk for another fracture  · Take your medicine as directed  Contact your healthcare provider if you think your medicine is not helping or if you have side effects  Tell him or her if you are allergic to any medicine  Keep a list of the medicines, vitamins, and herbs you take  Include the amounts, and when and why you take them  Bring the list or the pill bottles to follow-up visits  Carry your medicine list with you in case of an emergency  Heat and ice:   · Apply ice  on your back for 15 to 20 minutes every hour or as directed  Use an ice pack, or put crushed ice in a plastic bag  Cover it with a towel before you apply it  Ice helps prevent tissue damage and decreases swelling and pain  · Apply heat  on your back for 20 to 30 minutes every 2 hours for as many days as directed  Heat helps decrease pain and muscle spasms  Activity:   · Avoid activities that may make the pain worse, such as picking up heavy objects  When the pain decreases, begin normal, slow movements as directed by your healthcare provider  Your healthcare provider may have you do weight-bearing exercises such as walking  You may also do non-weight-bearing exercises such as swimming and bicycling      · You may need to use a walker or cane  Ask your healthcare provider for more information about how to use a cane or a walker  · When you  objects, bend at the hips and knees  Never bend from the waist only  Use bent knees and your leg muscles as you lift the object  While you lift the object, keep it close to your chest  Try not to twist or lift anything above your waist     Physical and occupational therapy:  Your healthcare provider may recommend you start or continue one or both types of therapy  A physical therapist teaches you exercises to help improve movement and strength, and to decrease pain  An occupational therapist teaches you skills to help with your daily activities  Manage pain during sleep:   · Do not sleep on a waterbed  Waterbeds do not provide good back support  · Sleep on a firm mattress  You may also put a ½ to 1-inch piece of plywood between the mattress and box spring  · Sleep on your back with a pillow under your knees  This will decrease pressure on your back  You may also sleep on your side with 1 or both of your knees bent and a pillow between them  It may also be helpful to sleep on your stomach with a pillow under you at waist level  Follow up with your doctor or orthopedist as directed: You may need to return for x-rays or other tests  Write down your questions so you remember to ask them during your visits  © Copyright [address] Information is for End User's use only and may not be sold, redistributed or otherwise used for commercial purposes  All illustrations and images included in CareNotes® are the copyrighted property of A D A M [employer]  or [facility] [employer]rmel[employer]   The above information is an  only  It is not intended as medical advice for individual conditions or treatments  Talk to your doctor, nurse or pharmacist before following any medical regimen to see if it is safe and effective for you

## 2021-01-20 NOTE — H&P (VIEW-ONLY)
Assessment  1  Thoracic compression fracture, with delayed healing, subsequent encounter - Bilateral  -     Case request operating room: BLOCK MEDIAL BRANCH T7, T8, T9, 10; Standing  -     Case request operating room: BLOCK MEDIAL BRANCH T7, T8, T9, 10  -     Ambulatory referral to Physical Therapy; Future    2  Thoracic back pain, unspecified back pain laterality, unspecified chronicity - Bilateral  -     Ambulatory referral to Pain Management  -     celecoxib (CeleBREX) 200 mg capsule; Take 1 capsule (200 mg total) by mouth 2 (two) times a day  -     Case request operating room: BLOCK MEDIAL BRANCH T7, T8, T9, 10; Standing  -     Case request operating room: BLOCK MEDIAL BRANCH T7, T8, T9, 10  -     Ambulatory referral to Physical Therapy; Future    Chronic axial mid back pain described primarily arthritic features with minimal intercostal nerve radiation  X-ray shows a slight compression deformity of T8 and T10 vertebral bodies  History consistent with chronic compression deformity as patient reports having fallen secondary to seizure 7 months ago  He has not trialed conservative therapies including physical therapy or other NSAIDs other than ibuprofen  Reasonable at this time to employ multimodal pain therapy approach in treating this patient's pain  Plan  -bilateral T7, T8, T9 and T10 medial branch blocks  -PT for compression deformity for paraspinal muscle strengthening  -Celebrex 200 mg b i d  Prescribed  Patient educated not to take other NSAIDs with this medication as well as bleeding complications  There are risks associated with opioid medications, including dependence, addiction and tolerance  The patient understands and agrees to use these medications only as prescribed  Potential side effects of the medications include, but are not limited to, constipation, drowsiness, addiction, impaired judgment and risk of fatal overdose if not taken as prescribed   The patient was warned against driving while taking sedation medications  Sharing medications is a felony  At this point in time, the patient is showing no signs of addiction, abuse, diversion or suicidal ideation  [place] Prescription Drug Monitoring Program report was reviewed and was appropriate     Complete risks and benefits including bleeding, infection, tissue reaction, nerve injury and allergic reaction were discussed  The approach was demonstrated using models and literature was provided  Verbal and written consent was obtained  My impressions and treatment recommendations were discussed in detail with the patient who verbalized understanding and had no further questions  Discharge instructions were provided  I personally saw and examined the patient and I agree with the above discussed plan of care  New Medications Ordered This Visit   Medications    Acetaminophen (Tylenol) 325 MG CAPS     Sig: Take 325 mg by mouth    diazepam (VALIUM) 5 mg tablet     Si mg    celecoxib (CeleBREX) 200 mg capsule     Sig: Take 1 capsule (200 mg total) by mouth 2 (two) times a day     Dispense:  60 capsule     Refill:  0       History of Present Illness    Camila jA is a 38 y o  male with past medical history of seizure disorder on Keppra and diazepam, presenting with axial mid back pain for the better part of 7 months  He reported reports having had a seizure and falling 7 months ago and since having intense midback pain that is described by primarily aching, nagging, indolent, stabbing features  Sneezing and coughing as well as walking or extending his back seem to exacerbate the pain  He describes 8/10 consistent pain isolated to the T8 and T10 regions of his midback with minimal radiation into the intercostal nerve distribution bilaterally  The pain significantly compromises independent activities of daily living and overall function    He has not yet participated in physical therapy and otherwise has been taking Tylenol and ibuprofen with modest benefit  He has not trialed interventional procedures including medial branch blocks or epidural steroid injections in the past   He denies any weakness numbness or paresthesias or any bowel or bladder abnormality secondary to this pain  I have personally reviewed and/or updated the patient's past medical history, past surgical history, family history, social history, current medications, allergies, and vital signs today  Review of Systems   Constitutional: Positive for activity change  HENT: Negative  Eyes: Negative  Respiratory: Negative  Cardiovascular: Negative  Gastrointestinal: Negative  Endocrine: Negative  Genitourinary: Negative  Musculoskeletal: Positive for arthralgias, back pain and myalgias  Skin: Negative  Allergic/Immunologic: Negative  Hematological: Negative  Psychiatric/Behavioral: Negative  All other systems reviewed and are negative  Patient Active Problem List   Diagnosis    Thoracic compression fracture, with delayed healing, subsequent encounter    Thoracic back pain       Past Medical History:   Diagnosis Date    [facility] 2012    GERD (gastroesophageal reflux disease)     Migraine     [facility]izures ([facility] )        History reviewed  No pertinent surgical history  History reviewed  No pertinent family history      Social History     Occupational History    Not on file   Tobacco Use    Smoking status: Never Smoker    Smokeless tobacco: Never Used   Substance and Sexual Activity    Alcohol use: Yes     Frequency: Monthly or less     Drinks per session: 1 or 2    Drug use: Yes     Types: Marijuana    Sexual activity: Not on file       Current Outpatient Medications on File Prior to Visit   Medication Sig    Acetaminophen (Tylenol) 325 MG CAPS Take 325 mg by mouth    levETIRAcetam (KEPPRA) 500 mg tablet Take 1 tablet (500 mg total) by mouth every 12 (twelve) hours    methylPREDNISolone 4 MG tablet therapy pack Use as directed on package    diazepam (VALIUM) 5 mg tablet 5 mg     No current facility-administered medications on file prior to visit  Allergies   Allergen Reactions    Sumatriptan Rash     Burning sensation   skin gets warm  skin gets warm           Physical Exam    /70 (BP Location: Right arm, Patient Position: Sitting, Cuff Size: Standard)   Pulse 63   Temp 97 5 °F (36 4 °C) (Temporal)   Resp 20   Ht 5' 8 25" (1 734 m)   Wt 82 6 kg (182 lb)   BMI 27 47 kg/m²     Constitutional: normal, well developed, well nourished, alert, in no distress and non-toxic and no overt pain behavior  Eyes: anicteric  HEENT: grossly intact  Neck: supple, symmetric, trachea midline and no masses   Pulmonary:even and unlabored  Cardiovascular:No edema or pitting edema present  Skin:Normal without rashes or lesions and well hydrated  Psychiatric:Mood and affect appropriate  Neurologic:Cranial Nerves II-XII grossly intact Sensation grossly intact; no clonus negative griffiths's  Reflexes 2+ and brisk  SLR negative bilaterally  Musculoskeletal:normal gait  Normal heel toe and tip toe walking  5/5 strength in all active range of motion movements bilaterally  Significant pain with thoracic facet loading bilaterally and with lateral spine rotation  TTP over thoracic paraspinal muscles around areas of T8 and T10 vertebral body  Tenderness to palpation over spinous processes as well  Negative magdy's test, negative gaenslen's negative SIJ loading bilaterally  Imaging    THORACIC SPINE     INDICATION:   back pain      COMPARISON:  None     VIEWS:  XR SPINE THORACIC 3 VW  Images: 4     FINDINGS:     Slight loss in axial height with slight anterior wedge deformity of T10 and T8 of unknown age      Otherwise normal thoracic kyphosis    No significant scoliosis      No significant degenerative changes       Visualized lungs are clear       The pedicles appear intact         IMPRESSION:  Very slight anterior wedge deformities of T8 and T10 of unknown age  Consider MRI if an acute fractures suspected clinically

## 2021-01-21 ENCOUNTER — APPOINTMENT (OUTPATIENT)
Dept: RADIOLOGY | Facility: HOSPITAL | Age: 36
End: 2021-01-21
Payer: COMMERCIAL

## 2021-01-21 ENCOUNTER — HOSPITAL ENCOUNTER (OUTPATIENT)
Facility: HOSPITAL | Age: 36
Setting detail: OUTPATIENT SURGERY
Discharge: HOME | End: 2021-01-21
Attending: ANESTHESIOLOGY | Admitting: ANESTHESIOLOGY
Payer: COMMERCIAL

## 2021-01-21 VITALS
TEMPERATURE: 98.1 F | HEART RATE: 61 BPM | DIASTOLIC BLOOD PRESSURE: 83 MMHG | OXYGEN SATURATION: 98 % | SYSTOLIC BLOOD PRESSURE: 121 MMHG | RESPIRATION RATE: 18 BRPM

## 2021-01-21 PROCEDURE — 76000 FLUOROSCOPY <1 HR PHYS/QHP: CPT

## 2021-01-21 PROCEDURE — 64491 INJ PARAVERT F JNT C/T 2 LEV: CPT | Performed by: ANESTHESIOLOGY

## 2021-01-21 PROCEDURE — 64490 INJ PARAVERT F JNT C/T 1 LEV: CPT | Performed by: ANESTHESIOLOGY

## 2021-01-21 PROCEDURE — 64492 INJ PARAVERT F JNT C/T 3 LEV: CPT | Performed by: ANESTHESIOLOGY

## 2021-01-21 RX ORDER — LIDOCAINE HYDROCHLORIDE 10 MG/ML
10 INJECTION, SOLUTION EPIDURAL; INFILTRATION; INTRACAUDAL; PERINEURAL ONCE
Status: COMPLETED | OUTPATIENT
Start: 2021-01-21 | End: 2021-01-21

## 2021-01-21 RX ORDER — METHYLPREDNISOLONE ACETATE 80 MG/ML
80 INJECTION, SUSPENSION INTRA-ARTICULAR; INTRALESIONAL; INTRAMUSCULAR; PARENTERAL; SOFT TISSUE ONCE
Status: COMPLETED | OUTPATIENT
Start: 2021-01-21 | End: 2021-01-21

## 2021-01-21 RX ORDER — ALPRAZOLAM 0.5 MG/1
0.5 TABLET ORAL ONCE
Status: DISCONTINUED | OUTPATIENT
Start: 2021-01-21 | End: 2021-01-21 | Stop reason: HOSPADM

## 2021-01-21 RX ORDER — BUPIVACAINE HCL/PF 2.5 MG/ML
10 VIAL (ML) INJECTION ONCE
Status: COMPLETED | OUTPATIENT
Start: 2021-01-21 | End: 2021-01-21

## 2021-01-21 NOTE — PROCEDURES
Pre-procedure Diagnosis: Lumbar Facet Arthropathy  Post-procedure Diagnosis: Lumbar Facet Arthropathy  Procedure Title(s):  [Bilateral T7, T8, T9, T10] medial branch nerve blocks [(DIAGNOSTIC)]  Attending Surgeon:   Christina Snyder MD  Anesthesia:   Local     Indications: The patient is a 38y o  year-old male with a diagnosis of lumbar facet arthropathy  The patient's history and physical exam were reviewed  The risks, benefits and alternatives to the procedure were discussed, and all questions were answered to the patient's satisfaction  The patient agreed to proceed, and written informed consent was obtained  Procedure in Detail: The patient was brought into the procedure room and placed in the prone position on the fluoroscopy table  The area of the lumbar spine was prepped with chloraprep and then draped in a sterile manner  AP fluoroscopy was used to identify the [T7-T10] levels on the [LEFT] side  The C-arm was obliqued to visualize the junction of the superior articulate process and transverse process  The sacral ala was identified and marked  The skin in these identified areas was anesthetized with 1% lidocaine  A 22-gauge, 3½-inch spinal needle was advanced toward each of these points under fluoroscopic guidance  Once bone was contacted, negative aspiration was confirmed and [1-mL] of a [8mL]mixture of [7mL] [0 25% bupivicaine] and 1mL of 80mg/mL Depomedrol was injected at each level  (The same procedure was performed on the opposite side )    After the procedure was completed, the patient's back was cleaned and bandages were placed at the needle insertion sites  Disposition: The patient tolerated the procedure well, and there were no apparent complications  The patient was taken to the recovery area where written discharge instructions for the procedure were given  The patient was given a pain diary to determine if the patient's pain improves following the injection   Once the diary is returned we will consider next appropriate course of treatment      Postoperative pain relief [WAS] significant    Estimated Blood Loss: None  Specimens Obtained: N/A

## 2021-01-21 NOTE — OP NOTE
OPERATIVE REPORT  PATIENT NAME: Lesvai March    :  1985  MRN: [id number]  Pt Location:  GI ROOM 01    SURGERY DATE: 2021    Surgeon(s) and Role: Keerthi Arango MD - Primary    Preop Diagnosis:  Thoracic compression fracture, with delayed healing, subsequent encounter [S22 000G]  Thoracic back pain, unspecified back pain laterality, unspecified chronicity [M54 6]    Post-Op Diagnosis Codes:      * Thoracic compression fracture, with delayed healing, subsequent encounter [S22 000G]     * Thoracic back pain, unspecified back pain laterality, unspecified chronicity [M54 6]    Procedure(s) (LRB):  T7 T8 T9 T10 MEDIAL BRANCH BLOCK #1 (Bilateral)    Specimen(s):  * No specimens in log *    Estimated Blood Loss:   Minimal    Drains:  * No LDAs found *    Anesthesia Type:   Local    Operative Indications:  Thoracic compression fracture, with delayed healing, subsequent encounter [S22 000G]  Thoracic back pain, bilateral, unspecified chronicity [M54 6]    Operative Findings:  Bilateral T7, T8, T9, T10 medial branch regions identified under fluoroscopic guidance    Complications:   None    Procedure and Technique:  Please see detailed procedure note     I was present for the entire procedure    Patient Disposition:  PACU     SIGNATURE: Virgen Quiles MD  DATE: 2021  TIME: 1:12 PM

## 2021-01-21 NOTE — DISCHARGE INSTRUCTIONS
CASEY LANDEROS WILL BE REACHING OUT WITHIN THE NEXT WEEK REGARDING THE DEGREE OF YOUR PAIN RELIEF AND TO ASK YOU ABOUT ANY INCREASE IN ABILITY TO PARTICIPATE WITH YOUR DAILY ACTIVITIES  BASED ON YOUR RESPONSE, WE WILL BE IN CONTACT WITH REGARD TO THE NEXT STEPS                                                    PLEASE CONTACT US BY CALLING THE [facility] AT [facility] WITH ANY QUESTIONS: [phone]      MEDIAL BRANCH BLOCK DISCHARGE INSTRUCTIONS      ACTIVITY  · Please do activities that will bring the normal pain that we are rating  For example, if vacuuming or walking increases the painm do that  Aki twill give the most accurate response to the diary  · You may shower, but no tub baths today, or applied heat  CARE OF THE INJECTION SITE  · This area may be numb for several hours after the injection  · Notify the [facility] if you have any of the following: redness, drainage, swelling or fever above 100°F     SPECIAL INSTRUCTIONS  · Please return the MBB diary to our office by mail, fax, or drop it off  MEDICATIONS  · Please do not take any break through or short acting pain medications for 8 hours after the block  · Continue to take all routine medications  · Our office may have instructed you to hold some medications  · You may resume _______________________________________________  If you have any problems specifically related to your procedure, please call our office at [phone]  Problems not related to your procedure should be directed at your primary care physician  Lumbar Radiofrequency Ablation   WHAT YOU NEED TO KNOW:   What do I need to know about lumbar radiofrequency ablation? Lumbar radiofrequency ablation (RFA) is a procedure used to treat facet joint pain in your lower back  Facet joints are found at the back of each vertebra  A needle electrode is used to send electrical currents to the nerves in your facet joint   The electrical currents create heat that damages the nerve so it cannot send pain signals  How do I prepare for lumbar RFA? Your healthcare provider will talk to you about how to prepare for this procedure  He may tell you not to eat or drink anything after midnight on the day of your procedure  He will tell you what medicines to take or not take on the day of your procedure  What will happen during lumbar RFA? · You will lie on your stomach  You will be given local anesthesia to numb the area of your back where the needle electrode will be inserted  You may be given a sedative to help keep you relaxed  You may still feel pressure or pushing during the procedure, but you should not feel any pain  Your healthcare provider will use fluoroscopy (a type of x-ray) to guide the needle electrode to the nerves near your facet joint  · Your healthcare provider may touch the affected nerve to make sure the needle electrode is in the right place  You will feel tingling or pressure when he does this  He will then apply local anesthesia to the nerve to numb it  This will prevent you from feeling pain when he applies heat to the nerve  Your healthcare provider will then apply heat to the nerve using the needle electrode  He may need to apply heat to more than one nerve  He will remove the needle electrode and apply a bandage over the area  What are the risks of lumbar RFA? You may have pain, numbness, tingling, or burning in the area where the lumbar RFA was done  These normally go away within 6 weeks  The needle electrode may injure your spinal nerves  This may cause permanent leg weakness or nerve pain  CARE AGREEMENT:   You have the right to help plan your care  Learn about your health condition and how it may be treated  Discuss treatment options with your healthcare providers to decide what care you want to receive  You always have the right to refuse treatment  The above information is an  only   It is not intended as medical advice for individual conditions or treatments  Talk to your doctor, nurse or pharmacist before following any medical regimen to see if it is safe and effective for you  © Copyright [address] Information is for End User's use only and may not be sold, redistributed or otherwise used for commercial purposes   All illustrations and images included in CareNotes® are the copyrighted property of [employer] D [employer] M [employer]  or [address] [place] [address]

## 2021-01-21 NOTE — INTERVAL H&P NOTE
H&P reviewed  After examining the patient I find no changes in the patients condition since the H&P had been written      Vitals:    01/21/21 1202   BP: 113/72   Pulse: 62   Resp: 18   Temp: 98 °F (36 7 °C)   SpO2: 97%

## 2021-01-22 LAB
ATRIAL RATE: 66 BPM
P AXIS: 39 DEGREES
PR INTERVAL: 156 MS
QRS AXIS: 64 DEGREES
QRSD INTERVAL: 88 MS
QT INTERVAL: 384 MS
QTC INTERVAL: 402 MS
T WAVE AXIS: 36 DEGREES
VENTRICULAR RATE: 66 BPM

## 2021-01-22 PROCEDURE — 93010 ELECTROCARDIOGRAM REPORT: CPT | Performed by: INTERNAL MEDICINE

## 2021-01-25 ENCOUNTER — TELEPHONE (OUTPATIENT)
Dept: OTHER | Facility: OTHER | Age: 36
End: 2021-01-25

## 2021-01-25 ENCOUNTER — TELEPHONE (OUTPATIENT)
Dept: PAIN MEDICINE | Facility: CLINIC | Age: 36
End: 2021-01-25

## 2021-01-25 DIAGNOSIS — S22.080G COMPRESSION FRACTURE OF T12 VERTEBRA WITH DELAYED HEALING, SUBSEQUENT ENCOUNTER: ICD-10-CM

## 2021-01-25 DIAGNOSIS — M43.9 COMPRESSION DEFORMITY OF VERTEBRA: Primary | ICD-10-CM

## 2021-01-25 NOTE — TELEPHONE ENCOUNTER
Will order MRI thoracic spine to determine any nerve root impingement or spinal cord impingement caused by vertebral compression is patient only had 40% relief from recent medial branch blocks for about a day without improved ability to participate with independent activities of daily living  He has been to physical therapy as well with only modest benefit

## 2021-01-25 NOTE — TELEPHONE ENCOUNTER
Patient calling to advise he needs to do his daily check in paperwork for his pain levels still; that he faxes in  Also wants to speak with   about his return to work

## 2021-01-29 ENCOUNTER — HOSPITAL ENCOUNTER (EMERGENCY)
Facility: HOSPITAL | Age: 36
Discharge: HOME | End: 2021-01-29
Attending: EMERGENCY MEDICINE | Admitting: EMERGENCY MEDICINE
Payer: COMMERCIAL

## 2021-01-29 ENCOUNTER — APPOINTMENT (EMERGENCY)
Dept: CT IMAGING | Facility: HOSPITAL | Age: 36
End: 2021-01-29
Payer: COMMERCIAL

## 2021-01-29 ENCOUNTER — APPOINTMENT (EMERGENCY)
Dept: RADIOLOGY | Facility: HOSPITAL | Age: 36
End: 2021-01-29
Payer: COMMERCIAL

## 2021-01-29 VITALS
SYSTOLIC BLOOD PRESSURE: 117 MMHG | RESPIRATION RATE: 19 BRPM | TEMPERATURE: 97.3 F | DIASTOLIC BLOOD PRESSURE: 73 MMHG | OXYGEN SATURATION: 100 % | HEART RATE: 59 BPM | BODY MASS INDEX: 27.03 KG/M2 | WEIGHT: 178.35 LBS | HEIGHT: 68 IN

## 2021-01-29 DIAGNOSIS — R53.1 GENERALIZED WEAKNESS: Primary | ICD-10-CM

## 2021-01-29 LAB
ALBUMIN SERPL BCP-MCNC: 3.9 G/DL (ref 3.5–5)
ALP SERPL-CCNC: 136 U/L (ref 46–116)
ALT SERPL W P-5'-P-CCNC: 30 U/L (ref 12–78)
AMPHETAMINES SERPL QL SCN: NEGATIVE
ANION GAP SERPL CALCULATED.3IONS-SCNC: 9 MMOL/L (ref 4–13)
AST SERPL W P-5'-P-CCNC: 14 U/L (ref 5–45)
ATRIAL RATE: 67 BPM
BARBITURATES UR QL: NEGATIVE
BASOPHILS # BLD AUTO: 0.08 THOUSANDS/ΜL (ref 0–0.1)
BASOPHILS NFR BLD AUTO: 1 % (ref 0–1)
BENZODIAZ UR QL: NEGATIVE
BILIRUB SERPL-MCNC: 0.58 MG/DL (ref 0.2–1)
BILIRUB UR QL STRIP: NEGATIVE
BUN SERPL-MCNC: 17 MG/DL (ref 5–25)
CALCIUM SERPL-MCNC: 8.3 MG/DL (ref 8.3–10.1)
CHLORIDE SERPL-SCNC: 103 MMOL/L (ref 100–108)
CLARITY UR: CLEAR
CO2 SERPL-SCNC: 28 MMOL/L (ref 21–32)
COCAINE UR QL: NEGATIVE
COLOR UR: YELLOW
CREAT SERPL-MCNC: 1.02 MG/DL (ref 0.6–1.3)
EOSINOPHIL # BLD AUTO: 0.35 THOUSAND/ΜL (ref 0–0.61)
EOSINOPHIL NFR BLD AUTO: 2 % (ref 0–6)
ERYTHROCYTE [DISTWIDTH] IN BLOOD BY AUTOMATED COUNT: 12.8 % (ref 11.6–15.1)
GFR SERPL CREATININE-BSD FRML MDRD: 94 ML/MIN/1.73SQ M
GLUCOSE SERPL-MCNC: 71 MG/DL (ref 65–140)
GLUCOSE SERPL-MCNC: 80 MG/DL (ref 65–140)
GLUCOSE UR STRIP-MCNC: NEGATIVE MG/DL
HCT VFR BLD AUTO: 43.1 % (ref 36.5–49.3)
HGB BLD-MCNC: 14.3 G/DL (ref 12–17)
HGB UR QL STRIP.AUTO: NEGATIVE
IMM GRANULOCYTES # BLD AUTO: 0.04 THOUSAND/UL (ref 0–0.2)
IMM GRANULOCYTES NFR BLD AUTO: 0 % (ref 0–2)
KETONES UR STRIP-MCNC: NEGATIVE MG/DL
LACTATE SERPL-SCNC: 0.6 MMOL/L (ref 0.5–2)
LEUKOCYTE ESTERASE UR QL STRIP: NEGATIVE
LYMPHOCYTES # BLD AUTO: 2.05 THOUSANDS/ΜL (ref 0.6–4.47)
LYMPHOCYTES NFR BLD AUTO: 14 % (ref 14–44)
MAGNESIUM SERPL-MCNC: 2.1 MG/DL (ref 1.6–2.6)
MCH RBC QN AUTO: 29.7 PG (ref 26.8–34.3)
MCHC RBC AUTO-ENTMCNC: 33.2 G/DL (ref 31.4–37.4)
MCV RBC AUTO: 89 FL (ref 82–98)
METHADONE UR QL: NEGATIVE
MONOCYTES # BLD AUTO: 1.06 THOUSAND/ΜL (ref 0.17–1.22)
MONOCYTES NFR BLD AUTO: 7 % (ref 4–12)
NEUTROPHILS # BLD AUTO: 11.11 THOUSANDS/ΜL (ref 1.85–7.62)
NEUTS SEG NFR BLD AUTO: 76 % (ref 43–75)
NITRITE UR QL STRIP: NEGATIVE
NRBC BLD AUTO-RTO: 0 /100 WBCS
OPIATES UR QL SCN: NEGATIVE
OXYCODONE+OXYMORPHONE UR QL SCN: NEGATIVE
P AXIS: 40 DEGREES
PCP UR QL: NEGATIVE
PH UR STRIP.AUTO: 6.5 [PH]
PLATELET # BLD AUTO: 262 THOUSANDS/UL (ref 149–390)
PMV BLD AUTO: 9.6 FL (ref 8.9–12.7)
POTASSIUM SERPL-SCNC: 4 MMOL/L (ref 3.5–5.3)
PR INTERVAL: 150 MS
PROT SERPL-MCNC: 7.3 G/DL (ref 6.4–8.2)
PROT UR STRIP-MCNC: NEGATIVE MG/DL
QRS AXIS: 47 DEGREES
QRSD INTERVAL: 88 MS
QT INTERVAL: 390 MS
QTC INTERVAL: 412 MS
RBC # BLD AUTO: 4.82 MILLION/UL (ref 3.88–5.62)
SODIUM SERPL-SCNC: 140 MMOL/L (ref 136–145)
SP GR UR STRIP.AUTO: 1.01 (ref 1–1.03)
T WAVE AXIS: 11 DEGREES
THC UR QL: POSITIVE
TROPONIN I SERPL-MCNC: <0.02 NG/ML
UROBILINOGEN UR QL STRIP.AUTO: 1 E.U./DL
VENTRICULAR RATE: 67 BPM
WBC # BLD AUTO: 14.69 THOUSAND/UL (ref 4.31–10.16)

## 2021-01-29 PROCEDURE — 82948 REAGENT STRIP/BLOOD GLUCOSE: CPT

## 2021-01-29 PROCEDURE — 83605 ASSAY OF LACTIC ACID: CPT | Performed by: PHYSICIAN ASSISTANT

## 2021-01-29 PROCEDURE — 96360 HYDRATION IV INFUSION INIT: CPT

## 2021-01-29 PROCEDURE — 36415 COLL VENOUS BLD VENIPUNCTURE: CPT | Performed by: PHYSICIAN ASSISTANT

## 2021-01-29 PROCEDURE — 80307 DRUG TEST PRSMV CHEM ANLYZR: CPT | Performed by: PHYSICIAN ASSISTANT

## 2021-01-29 PROCEDURE — 81003 URINALYSIS AUTO W/O SCOPE: CPT | Performed by: PHYSICIAN ASSISTANT

## 2021-01-29 PROCEDURE — 99285 EMERGENCY DEPT VISIT HI MDM: CPT

## 2021-01-29 PROCEDURE — 80177 DRUG SCRN QUAN LEVETIRACETAM: CPT | Performed by: PHYSICIAN ASSISTANT

## 2021-01-29 PROCEDURE — 99284 EMERGENCY DEPT VISIT MOD MDM: CPT | Performed by: EMERGENCY MEDICINE

## 2021-01-29 PROCEDURE — 70498 CT ANGIOGRAPHY NECK: CPT

## 2021-01-29 PROCEDURE — 84484 ASSAY OF TROPONIN QUANT: CPT | Performed by: PHYSICIAN ASSISTANT

## 2021-01-29 PROCEDURE — 80053 COMPREHEN METABOLIC PANEL: CPT | Performed by: PHYSICIAN ASSISTANT

## 2021-01-29 PROCEDURE — 70496 CT ANGIOGRAPHY HEAD: CPT

## 2021-01-29 PROCEDURE — 85025 COMPLETE CBC W/AUTO DIFF WBC: CPT | Performed by: PHYSICIAN ASSISTANT

## 2021-01-29 PROCEDURE — 93005 ELECTROCARDIOGRAM TRACING: CPT

## 2021-01-29 PROCEDURE — G1004 CDSM NDSC: HCPCS

## 2021-01-29 PROCEDURE — 83735 ASSAY OF MAGNESIUM: CPT | Performed by: PHYSICIAN ASSISTANT

## 2021-01-29 PROCEDURE — 71045 X-RAY EXAM CHEST 1 VIEW: CPT

## 2021-01-29 RX ADMIN — IOHEXOL 85 ML: 350 INJECTION, SOLUTION INTRAVENOUS at 15:47

## 2021-01-29 RX ADMIN — SODIUM CHLORIDE 1000 ML: 0.9 INJECTION, SOLUTION INTRAVENOUS at 16:33

## 2021-01-29 NOTE — ED ATTENDING ATTESTATION
1/29/2021  ISamuel MD, saw and evaluated the patient  I have discussed the patient with the resident/non-physician practitioner and agree with the resident's/non-physician practitioner's findings, Plan of Care, and MDM as documented in the resident's/non-physician practitioner's note, except where noted  All available labs and Radiology studies were reviewed  I was present for key portions of any procedure(s) performed by the resident/non-physician practitioner and I was immediately available to provide assistance  At this point I agree with the current assessment done in the Emergency Department        ED Course         Critical Care Time  Procedures

## 2021-01-29 NOTE — ED NOTES
Pt in no acute distress  Ambulates with a steady gait   Verbalizes understanding of discharge instructions  Wife to come pick him up     Justino Jones RN  01/29/21 8831

## 2021-01-30 NOTE — ED PROVIDER NOTES
History  Chief Complaint   Patient presents with    Weakness - Generalized     pt c/o weakness and numbness in all extremities, memory difficulty, and tunnel vision starting sometime today-pt does not remember  hx nerve block in back 2wks ago, seizures  denies travel/sob/fever/cough     The patient is a 38year old male who presents emergency department today with the chief complaint of numbness all over his body and weakness all over and patient also feels lightheaded  Upon arrival the patient states that he awoke at 2:00 p m  For work felt very lightheaded and weird  Patient states that I will have to speak to his spouse regarding what happened because he does not recall this  Spouse Jeremi Grant that he is now on keppra for concern for seizures  She states that when he awoke at 2 pm  He looked pale and then stated that his "skin feels weird" with tunnel vision with pain in hands and neck  He felt very weak, unable to walk  She states that "this sometimes happens before seizures  Medical Problem  Location:  Numbness all over  Severity:  Unable to specify  Timing:  Constant  Progression:  Improving  Chronicity:  New  Context:  Awoke at 2:00 p m  Relieved by:  Nothing  Worsened by:  Nothing  Ineffective treatments:  Nothing  Associated symptoms: no abdominal pain, no chest pain, no congestion, no cough, no diarrhea, no ear pain, no fatigue, no fever, no headaches, no loss of consciousness, no myalgias, no nausea, no rash, no rhinorrhea, no shortness of breath, no sore throat, no vomiting and no wheezing        Prior to Admission Medications   Prescriptions Last Dose Informant Patient Reported?  Taking?   celecoxib (CeleBREX) 200 mg capsule   No Yes   Sig: Take 1 capsule (200 mg total) by mouth 2 (two) times a day   levETIRAcetam (KEPPRA) 500 mg tablet   No Yes   Sig: Take 1 tablet (500 mg total) by mouth every 12 (twelve) hours      Facility-Administered Medications: None       Past Medical History: Diagnosis Date    Brain bleed ([facility] [address] ) 2012    GERD (gastroesophageal reflux disease)     Migraine     Seizures (HCC)        Past Surgical History:   Procedure Laterality Date    FINGER SURGERY Right     5th digit    NERVE BLOCK Bilateral 1/21/2021    Procedure: T7 T8 T9 T10 MEDIAL BRANCH BLOCK #1;  Surgeon: Jay Dhaliwal MD;  [facility];  Service: Pain Management     WISDOM TOOTH EXTRACTION         History reviewed  No pertinent family history  I have reviewed and agree with the history as documented  E-Cigarette/Vaping    E-Cigarette Use Never User      E-Cigarette/Vaping Substances     Social History     Tobacco Use    Smoking status: Light Tobacco Smoker    Smokeless tobacco: Never Used   Substance Use Topics    Alcohol use: Yes     Frequency: Monthly or less     Drinks per session: 1 or 2    Drug use: Yes     Types: Marijuana       Review of Systems   Constitutional: Negative for chills, fatigue and fever  HENT: Negative for congestion, ear pain, rhinorrhea and sore throat  Eyes: Negative for pain and visual disturbance  Respiratory: Negative for cough, shortness of breath, wheezing and stridor  Cardiovascular: Negative for chest pain and palpitations  Gastrointestinal: Negative for abdominal pain, diarrhea, nausea and vomiting  Genitourinary: Negative for dysuria and hematuria  Musculoskeletal: Negative for arthralgias, back pain and myalgias  Skin: Negative for color change and rash  Neurological: Negative for seizures, loss of consciousness, speech difficulty and headaches  Physical Exam  Physical Exam  Vitals signs and nursing note reviewed  Constitutional:       General: He is not in acute distress  Appearance: He is well-developed  HENT:      Head: Normocephalic and atraumatic  Eyes:      Pupils: Pupils are equal, round, and reactive to light  Neck:      Musculoskeletal: Normal range of motion     Cardiovascular:      Rate and Rhythm: Normal rate and regular rhythm  Heart sounds: No murmur  Pulmonary:      Effort: Pulmonary effort is normal  No respiratory distress  Breath sounds: Normal breath sounds  Abdominal:      General: Bowel sounds are normal       Palpations: Abdomen is soft  Tenderness: There is no abdominal tenderness  Musculoskeletal:      Right lower leg: No edema  Left lower leg: No edema  Skin:     General: Skin is warm and dry  Capillary Refill: Capillary refill takes less than 2 seconds  Neurological:      General: No focal deficit present  Mental Status: He is alert and oriented to person, place, and time  Cranial Nerves: Cranial nerves are intact  Sensory: Sensation is intact  Motor: Motor function is intact  Coordination: Coordination is intact  Gait: Gait is intact        Comments: Patient able to ambulate from the wheelchair to the stretcher and get change in the hospital gown without assistance or difficulty   Psychiatric:         Behavior: Behavior normal          Vital Signs  ED Triage Vitals [01/29/21 1506]   Temperature Pulse Respirations Blood Pressure SpO2   (!) 97 3 °F (36 3 °C) 68 16 109/73 99 %      Temp Source Heart Rate Source Patient Position - Orthostatic VS BP Location FiO2 (%)   Temporal Monitor Lying Right arm --      Pain Score       --           Vitals:    01/29/21 1653 01/29/21 1654 01/29/21 1655 01/29/21 1700   BP: 107/66 107/71 114/71 117/73   Pulse: (!) 52 63 60 59   Patient Position - Orthostatic VS: Lying - Orthostatic VS Sitting - Orthostatic VS Standing - Orthostatic VS          Visual Acuity  Visual Acuity      Most Recent Value   L Pupil Size (mm)  3   R Pupil Size (mm)  3          ED Medications  Medications   sodium chloride 0 9 % bolus 1,000 mL (0 mL Intravenous Stopped 1/29/21 1714)   iohexol (OMNIPAQUE) 350 MG/ML injection (SINGLE-DOSE) 100 mL (85 mL Intravenous Given 1/29/21 1547)       Diagnostic Studies  Results Reviewed Procedure Component Value Units Date/Time    Rapid drug screen, urine [[id number]]  (Abnormal) Collected: 01/29/21 1659    Lab Status: Final result Specimen: Urine, Clean Catch Updated: 01/29/21 1724     Amph/Meth UR Negative     Barbiturate Ur Negative     Benzodiazepine Urine Negative     Cocaine Urine Negative     Methadone Urine Negative     Opiate Urine Negative     PCP Ur Negative     THC Urine Positive     Oxycodone Urine Negative    Narrative:      Presumptive report  If requested, specimen will be sent to reference lab for confirmation  FOR MEDICAL PURPOSES ONLY  IF CONFIRMATION NEEDED PLEASE CONTACT THE LAB WITHIN 5 DAYS  Drug Screen Cutoff Levels:  AMPHETAMINE/METHAMPHETAMINES  1000 ng/mL  BARBITURATES     200 ng/mL  BENZODIAZEPINES     200 ng/mL  COCAINE      300 ng/mL  METHADONE      300 ng/mL  OPIATES      300 ng/mL  PHENCYCLIDINE     25 ng/mL  THC       50 ng/mL  OXYCODONE      100 ng/mL    UA w Reflex to Microscopic w Reflex to Culture [[id number]] Collected: 01/29/21 1659    Lab Status: Final result Specimen: Urine, Clean Catch Updated: 01/29/21 1704     Color, UA Yellow     Clarity, UA Clear     Specific Gravity, UA 1 010     pH, UA 6 5     Leukocytes, UA Negative     Nitrite, UA Negative     Protein, UA Negative mg/dl      Glucose, UA Negative mg/dl      Ketones, UA Negative mg/dl      Urobilinogen, UA 1 0 E U /dl      Bilirubin, UA Negative     Blood, UA Negative    Lactic acid [[id number]]  (Normal) Collected: 01/29/21 1514    Lab Status: Final result Specimen: Blood from Arm, Right Updated: 01/29/21 1543     LACTIC ACID 0 6 mmol/L     Narrative:      Result may be elevated if tourniquet was used during collection      Troponin I [[id number]]  (Normal) Collected: 01/29/21 1514    Lab Status: Final result Specimen: Blood from Arm, Right Updated: 01/29/21 1542     Troponin I <0 02 ng/mL     Comprehensive metabolic panel [[id number]]  (Abnormal) Collected: 01/29/21 1514    Lab Status: Final result Specimen: Blood from Arm, Right Updated: 01/29/21 1540     Sodium 140 mmol/L      Potassium 4 0 mmol/L      Chloride 103 mmol/L      CO2 28 mmol/L      ANION GAP 9 mmol/L      BUN 17 mg/dL      Creatinine 1 02 mg/dL      Glucose 80 mg/dL      Calcium 8 3 mg/dL      AST 14 U/L      ALT 30 U/L      Alkaline Phosphatase 136 U/L      Total Protein 7 3 g/dL      Albumin 3 9 g/dL      Total Bilirubin 0 58 mg/dL      eGFR 94 ml/min/1 73sq m     Narrative:      [place] guidelines for Chronic Kidney Disease (CKD):     Stage 1 with normal or high GFR (GFR > 90 mL/min/1 73 square meters)    Stage 2 Mild CKD (GFR = 60-89 mL/min/1 73 square meters)    Stage 3A Moderate CKD (GFR = 45-59 mL/min/1 73 square meters)    Stage 3B Moderate CKD (GFR = 30-44 mL/min/1 73 square meters)    Stage 4 Severe CKD (GFR = 15-29 mL/min/1 73 square meters)    Stage 5 End Stage CKD (GFR <15 mL/min/1 73 square meters)  Note: GFR calculation is accurate only with a steady state creatinine    Magnesium [[id number]]  (Normal) Collected: 01/29/21 1514    Lab Status: Final result Specimen: Blood from Arm, Right Updated: 01/29/21 1540     Magnesium 2 1 mg/dL     CBC and differential [[id number]]  (Abnormal) Collected: 01/29/21 1514    Lab Status: Final result Specimen: Blood from Arm, Right Updated: 01/29/21 1524     WBC 14 69 Thousand/uL      RBC 4 82 Million/uL      Hemoglobin 14 3 g/dL      Hematocrit 43 1 %      MCV 89 fL      MCH 29 7 pg      MCHC 33 2 g/dL      RDW 12 8 %      MPV 9 6 fL      Platelets 478 Thousands/uL      nRBC 0 /100 WBCs      Neutrophils Relative 76 %      Immat GRANS % 0 %      Lymphocytes Relative 14 %      Monocytes Relative 7 %      Eosinophils Relative 2 %      Basophils Relative 1 %      Neutrophils Absolute 11 11 Thousands/µL      Immature Grans Absolute 0 04 Thousand/uL      Lymphocytes Absolute 2 05 Thousands/µL      Monocytes Absolute 1 06 Thousand/µL      Eosinophils Absolute 0 35 Thousand/µL      Basophils Absolute 0 08 Thousands/µL     Levetiracetam level [[id number]] Collected: 01/29/21 1514    Lab Status: In process Specimen: Blood from Arm, Right Updated: 01/29/21 1521    Fingerstick Glucose (POCT) [[id number]]  (Normal) Collected: 01/29/21 1504    Lab Status: Final result Updated: 01/29/21 1510     POC Glucose 71 mg/dl                  CTA head and neck with and without contrast   Final Result by Jules 6, DO (01/29 1604)      Normal CT of the brain  Normal CTA of the neck and brain  Workstation performed: [id number]         XR chest 1 view portable   Final Result by Jose Daniel Scherer MD (01/29 1537)   No acute cardiopulmonary disease  Findings are stable            Workstation performed: [id number]                    Procedures  Procedures         ED Course           SBIRT 20yo+      Most Recent Value   SBIRT (24 yo +)   In order to provide better care to our patients, we are screening all of our patients for alcohol and drug use  Would it be okay to ask you these screening questions? No Filed at: 01/29/2021 1516                    MDM  Number of Diagnoses or Management Options  Generalized weakness:   Diagnosis management comments: Lab work unremarkable  THC positive in urine sample  Imaging unremarkable  Did speak with Neurology as noted above recommendation was a follow-up with Neurology and continue with outpatient order of EEG which is scheduled this week    Patient expressed understanding was in agreement treatment plan       Amount and/or Complexity of Data Reviewed  Clinical lab tests: ordered and reviewed  Tests in the radiology section of CPT®: ordered and reviewed  Decide to obtain previous medical records or to obtain history from someone other than the patient: yes  Obtain history from someone other than the patient: yes  Review and summarize past medical records: yes  Discuss the patient with other providers: yes  Independent visualization of images, tracings, or specimens: yes    Risk of Complications, Morbidity, and/or Mortality  Presenting problems: moderate  Diagnostic procedures: moderate  Management options: moderate    Patient Progress  Patient progress: stable      Disposition  Final diagnoses:   Generalized weakness     Time reflects when diagnosis was documented in both MDM as applicable and the Disposition within this note     Time User Action Codes Description Comment    1/29/2021  5:11 PM Margy Wong Add [R53 1] Generalized weakness       ED Disposition     ED Disposition Condition Date/Time Comment    Discharge Stable Fri Jan 29, 2021  5:11 PM Pj Hastings discharge to home/self care              Follow-up Information     Follow up With Specialties Details Why Contact Info    Angelia Fajardo MD Neurology Schedule an appointment as soon as possible for a visit   [address] [place] [address] [facility]  [phone]            Discharge Medication List as of 1/29/2021  5:12 PM      CONTINUE these medications which have NOT CHANGED    Details   celecoxib (CeleBREX) 200 mg capsule Take 1 capsule (200 mg total) by mouth 2 (two) times a day, Starting Wed 1/20/2021, Normal      levETIRAcetam (KEPPRA) 500 mg tablet Take 1 tablet (500 mg total) by mouth every 12 (twelve) hours, Starting Fri 1/15/2021, Normal               PDMP Review     None          ED Provider  Electronically Signed by           Fran Klein PA-C  01/29/21 2006       Adam Devi MD  01/30/21 1471

## 2021-02-01 LAB — LEVETIRACETAM SERPL-MCNC: 7.4 UG/ML (ref 10–40)

## 2021-02-02 ENCOUNTER — TELEPHONE (OUTPATIENT)
Dept: NEUROLOGY | Facility: CLINIC | Age: 36
End: 2021-02-02

## 2021-02-02 NOTE — TELEPHONE ENCOUNTER
VM from patient requesting appointment with Dr Dipesh Harrington as follow up from his ER visit at [facility]  (Referral in chart)

## 2021-02-04 ENCOUNTER — HOSPITAL ENCOUNTER (OUTPATIENT)
Dept: MRI IMAGING | Facility: HOSPITAL | Age: 36
Discharge: HOME | End: 2021-02-04
Attending: ANESTHESIOLOGY
Payer: COMMERCIAL

## 2021-02-04 DIAGNOSIS — M43.9 COMPRESSION DEFORMITY OF VERTEBRA: ICD-10-CM

## 2021-02-04 PROCEDURE — 72146 MRI CHEST SPINE W/O DYE: CPT

## 2021-02-04 PROCEDURE — G1004 CDSM NDSC: HCPCS

## 2021-02-07 ENCOUNTER — TELEPHONE (OUTPATIENT)
Dept: EMERGENCY DEPT | Facility: HOSPITAL | Age: 36
End: 2021-02-07

## 2021-02-07 DIAGNOSIS — R56.9 SEIZURES (HCC): Primary | ICD-10-CM

## 2021-02-07 RX ORDER — LEVETIRACETAM 500 MG/1
500 TABLET ORAL EVERY 12 HOURS SCHEDULED
Qty: 180 TABLET | Refills: 0 | Status: SHIPPED | OUTPATIENT
Start: 2021-02-07 | End: 2021-02-24 | Stop reason: SDUPTHER

## 2021-02-08 ENCOUNTER — TELEPHONE (OUTPATIENT)
Dept: OTHER | Facility: HOSPITAL | Age: 36
End: 2021-02-08

## 2021-02-08 ENCOUNTER — TELEPHONE (OUTPATIENT)
Dept: NEUROLOGY | Facility: CLINIC | Age: 36
End: 2021-02-08

## 2021-02-08 ENCOUNTER — PREP FOR PROCEDURE (OUTPATIENT)
Dept: PAIN MEDICINE | Facility: CLINIC | Age: 36
End: 2021-02-08

## 2021-02-08 DIAGNOSIS — M54.6 THORACIC BACK PAIN, UNSPECIFIED BACK PAIN LATERALITY, UNSPECIFIED CHRONICITY: Primary | ICD-10-CM

## 2021-02-08 DIAGNOSIS — S22.000G THORACIC COMPRESSION FRACTURE, WITH DELAYED HEALING, SUBSEQUENT ENCOUNTER: ICD-10-CM

## 2021-02-08 NOTE — TELEPHONE ENCOUNTER
Spoke with patient at length regarding  Chronic compression deformity seen at T6-T10  No evidence of nerve root compression or disc herniation identified MRI of  Thoracic spine  Will Proceed with T8-T9 interlaminar epidural steroid injection to help his midthoracic pain

## 2021-02-08 NOTE — TELEPHONE ENCOUNTER
Patient is scheduled for T8 T9 ITESI on 2/11/21  Denies blood thinners  Instructions given verbally:  Nothing to eat or drink one hour prior to procedure  Wear comfortable clothing  Will require transportation  If patient becomes ill or is placed on antibiotics will call to inform  No vaccines 2 weeks before and 2 weeks after procedure  Patient agrees to above

## 2021-02-08 NOTE — TELEPHONE ENCOUNTER
Best contact number for patient:[phone]    Emergency Contact name and number:None    Referring provider and telephone number:Columba Lilia0 GOYO Conti    Primary Care Provider Name and if affiliated with Marnie Enid [facility]      Reason for Appointment/Dx:Seizure     Neurology Location patient would like to be seen:[place]     Order received? Yes                                                 Records Received? Yes in epic     Have you ever seen another Neurologist?       Yes [facility] aware we are non par  ( SELF PAY)    ID/Policy #:    Secondary Insurance:    ID/Policy#: Workman's Comp/ Accident/ School  Information      Workman's Comp/Accident/School related?        None    If yes name of Insurance company:    Date of Injury:    Type of Injury:    [address] Name and Telephone Number:     Notes:Appointment schedule with patient new patient pack sent                    Appointment date:  04-15-21 9:00am with Dr Dorothy Chavez

## 2021-02-10 ENCOUNTER — TELEPHONE (OUTPATIENT)
Dept: PAIN MEDICINE | Facility: CLINIC | Age: 36
End: 2021-02-10

## 2021-02-10 NOTE — LETTER
To whom it may concern:    Mary Leahy was seen at [facility] for a medical procedure on 2/11/21  He may return on 2/12/21 without any limitation  If you have any questions feel free to contact me at any time      Sincerely,          Romina Andres MD

## 2021-02-10 NOTE — TELEPHONE ENCOUNTER
----- Message from Giselle Nance sent at 2/10/2021  1:37 PM EST -----  Contact: [phone]  Patient called and said he forgot to ask should he be off work until after procedure, he just needs some kind of doctors not that he's allowed to work

## 2021-02-11 ENCOUNTER — HOSPITAL ENCOUNTER (OUTPATIENT)
Facility: HOSPITAL | Age: 36
Setting detail: OUTPATIENT SURGERY
Discharge: HOME | End: 2021-02-11
Attending: ANESTHESIOLOGY | Admitting: ANESTHESIOLOGY
Payer: COMMERCIAL

## 2021-02-11 ENCOUNTER — APPOINTMENT (OUTPATIENT)
Dept: RADIOLOGY | Facility: HOSPITAL | Age: 36
End: 2021-02-11
Payer: COMMERCIAL

## 2021-02-11 VITALS
OXYGEN SATURATION: 99 % | HEIGHT: 69 IN | TEMPERATURE: 97.8 F | WEIGHT: 180 LBS | SYSTOLIC BLOOD PRESSURE: 101 MMHG | DIASTOLIC BLOOD PRESSURE: 70 MMHG | HEART RATE: 50 BPM | BODY MASS INDEX: 26.66 KG/M2 | RESPIRATION RATE: 16 BRPM

## 2021-02-11 PROCEDURE — 76000 FLUOROSCOPY <1 HR PHYS/QHP: CPT

## 2021-02-11 PROCEDURE — 62321 NJX INTERLAMINAR CRV/THRC: CPT | Performed by: ANESTHESIOLOGY

## 2021-02-11 RX ORDER — METHYLPREDNISOLONE ACETATE 80 MG/ML
INJECTION, SUSPENSION INTRA-ARTICULAR; INTRALESIONAL; INTRAMUSCULAR; SOFT TISSUE AS NEEDED
Status: DISCONTINUED | OUTPATIENT
Start: 2021-02-11 | End: 2021-02-11 | Stop reason: HOSPADM

## 2021-02-11 RX ORDER — LIDOCAINE HYDROCHLORIDE 10 MG/ML
INJECTION, SOLUTION EPIDURAL; INFILTRATION; INTRACAUDAL; PERINEURAL AS NEEDED
Status: DISCONTINUED | OUTPATIENT
Start: 2021-02-11 | End: 2021-02-11 | Stop reason: HOSPADM

## 2021-02-11 RX ORDER — ALPRAZOLAM 0.5 MG/1
0.5 TABLET ORAL ONCE
Status: COMPLETED | OUTPATIENT
Start: 2021-02-11 | End: 2021-02-11

## 2021-02-11 RX ORDER — SODIUM CHLORIDE 9 MG/ML
INJECTION INTRAVENOUS AS NEEDED
Status: DISCONTINUED | OUTPATIENT
Start: 2021-02-11 | End: 2021-02-11 | Stop reason: HOSPADM

## 2021-02-11 RX ADMIN — ALPRAZOLAM 0.5 MG: 0.5 TABLET ORAL at 10:10

## 2021-02-11 NOTE — INTERVAL H&P NOTE
H&P reviewed  After examining the patient I find no changes in the patients condition since the H&P had been written      Vitals:    02/11/21 0946   BP: 114/74   Pulse: 69   Resp: 20   Temp: 97 9 °F (36 6 °C)   SpO2: 97%

## 2021-02-11 NOTE — OP NOTE
OPERATIVE REPORT  PATIENT NAME: Yuval Freed    :  1985  MRN: [id number]  Pt Location:  GI ROOM 01    SURGERY DATE: 2021    Surgeon(s) and Role: Reynold Marquez MD - Primary    Preop Diagnosis:  Thoracic back pain, unspecified back pain laterality, unspecified chronicity [M54 6]  Thoracic compression fracture, with delayed healing, subsequent encounter [S22 000G]    Post-Op Diagnosis Codes:      * Thoracic back pain, unspecified back pain laterality, unspecified chronicity [M54 6]     * Thoracic compression fracture, with delayed healing, subsequent encounter [S22 000G]    Procedure(s) (LRB):  T9 T10 INTERLAMINAR THORACIC EPIDURAL STEROID INJECTION (N/A)    Specimen(s):  * No specimens in log *    Estimated Blood Loss:   Minimal    Drains:  * No LDAs found *    Anesthesia Type:   Local    Operative Indications:  Thoracic back pain, unspecified back pain laterality, unspecified chronicity [M54 6]  Thoracic compression fracture, with delayed healing, subsequent encounter [S22 000G]    Operative Findings:  T9-T10 epidurogram    Complications:   None    Procedure and Technique:  Please see detailed procedure note     I was present for the entire procedure    Patient Disposition:  PACU     SIGNATURE: Calin Ingram MD  DATE: 2021  TIME: 10:49 AM

## 2021-02-11 NOTE — PROCEDURES
Pre-procedure Diagnosis:       Lumbar Radiculopathy  Post-procedure Diagnosis:     Lumbar Radiculopathy  Procedure Title(s):                  1  [T9-T10] interlaminar epidural steroid injection                                                  2  Intraoperative fluoroscopy  Attending Surgeon:                 Trinda Meigs, MD  Anesthesia:                             Local      Indications: The patient is a  38y o  year-old male  with a diagnosis of Lumbar Radiculopathy  The patient's history and physical exam were reviewed  The risks, benefits and alternatives to the procedure were discussed, and all questions were answered to the patient's satisfaction  The patient agreed to proceed, and written informed consent was obtained  Procedure in Detail: The patient was brought into the procedure room and placed in the prone position on the fluoroscopy table  The area of the lumbar spine was prepped with chlorhexidine gluconate solution times one and draped in a sterile manner  The [T9-T10] interspace was identified and marked under AP fluoroscopy  The skin and subcutaneous tissues in the area were anesthetized with 1% lidocaine  A 18-gauge Tuohy epidural needle was directed toward the interspace under fluoroscopic guidance until the ligamentum flavum was engaged  From this point, a loss of resistance technique with saline was used to identify entrance of the needle into the epidural space  Once an appropriate loss was obtained, negative aspiration was confirmed, and 2 ml Omnipaque 240 contrast solution was injected  An appropriate epidurogram was noted  Then, after negative aspiration, a solution consisting of 1-mL depo-medrol (80mg/mL) and 3-mL preservative-free saline was easily injected  The needle was removed with a 1% lidocaine flush  The patient's back was cleaned and a bandage was placed over the site of needle insertion       Disposition: The patient tolerated the procedure well, and there were no apparent complications  The patient was taken to the recovery area where written discharge instructions for the procedure were given        Estimated Blood Loss: None  Specimens Obtained: N/A

## 2021-02-11 NOTE — DISCHARGE INSTRUCTIONS
PLEASE SCHEDULE 2 WEEK FOLLOW UP BY CALLING THE [facility] AT [place]: [phone]      Epidural Steroid Injection   AMBULATORY CARE:   What you need to know about an epidural steroid injection (SHARMAINE):  An SHARMAINE is a procedure to inject steroid medicine into the epidural space  The epidural space is between your spinal cord and vertebrae  Steroids reduce inflammation and fluid buildup in your spine that may be causing pain  You may be given pain medicine along with the steroids  How to prepare for an SHARMAINE:  Your healthcare provider will talk to you about how to prepare for your procedure  He or she will tell you what medicines to take or not take on the day of your procedure  You may need to stop taking blood thinners or other medicines several days before your procedure  You may need to adjust any diabetes medicine you take on the day of your procedure  Steroid medicine can increase your blood sugar level  Arrange for someone to drive you home when you are discharged  What will happen during an SHARMAINE:   · You will be given medicine to numb the procedure area  You will be awake for the procedure, but you will not feel pain  You may also be given medicine to help you relax  Contrast liquid will be used to help your healthcare provider see the area better  Tell the healthcare provider if you have ever had an allergic reaction to contrast liquid  · Your healthcare provider may place the needle into your neck area, middle of your back, or tailbone area  He may inject the medicine next to the nerves that are causing your pain  He may instead inject the medicine into a larger area of the epidural space  This helps the medicine spread to more nerves  Your healthcare provider will use a fluoroscope to help guide the needle to the right place  A fluoroscope is a type of x-ray   After the procedure, a bandage will be placed over the injection site to prevent infection  What will happen after an SHARMAINE:  You will have a bandage over the injection site to prevent infection  Your healthcare provider will tell you when you can bathe and any activity guidelines  You will be able to go home  Risks of an SHARMAINE:  You may have temporary or permanent nerve damage or paralysis  You may have bleeding or develop a serious infection, such as meningitis (swelling of the brain coverings)  An abscess may also develop  An abscess is a pus-filled area under the skin  You may need surgery to fix the abscess  You may have a seizure, anxiety, or trouble sleeping  If you are a man, you may have temporary erectile dysfunction (not able to have an erection)  Call your local emergency number (911 in the [address] [facility]) if:   · You have a seizure  · You have trouble moving your legs  Seek care immediately if:   · Blood soaks through your bandage  · You have a fever or chills, severe back pain, and the procedure area is sensitive to the touch  · You cannot control when you urinate or have a bowel movement  Call your doctor if:   · You have weakness or numbness in your legs  · Your wound is red, swollen, or draining pus  · You have nausea or are vomiting  · Your face or neck is red and you feel warm  · You have more pain than you had before the procedure  · You have swelling in your hands or feet  · You have questions or concerns about your condition or care  Care for your wound as directed: You may remove the bandage before you go to bed the day of your procedure  You may take a shower, but do not take a bath for at least 24 hours  Self-care:   · Do not drive,  use machines, or do strenuous activity for 24 hours after your procedure or as directed  · Continue other treatments  as directed  Steroid injections alone will not control your pain  The injections are meant to be used with other treatments, such as physical therapy      Follow up with your doctor as directed:  Write down your questions so you remember to ask them during your visits  © Copyright [address] Information is for End User's use only and may not be sold, redistributed or otherwise used for commercial purposes  All illustrations and images included in CareNotes® are the copyrighted property of A D A M [employer] Inc  or Dinesh Hughes  The above information is an  only  It is not intended as medical advice for individual conditions or treatments  Talk to your doctor, nurse or pharmacist before following any medical regimen to see if it is safe and effective for you

## 2021-02-13 DIAGNOSIS — M54.6 THORACIC BACK PAIN, UNSPECIFIED BACK PAIN LATERALITY, UNSPECIFIED CHRONICITY: ICD-10-CM

## 2021-02-13 RX ORDER — CELECOXIB 200 MG/1
CAPSULE ORAL
Qty: 60 CAPSULE | Refills: 0 | Status: SHIPPED | OUTPATIENT
Start: 2021-02-13 | End: 2021-04-16

## 2021-02-18 ENCOUNTER — TELEPHONE (OUTPATIENT)
Dept: PAIN MEDICINE | Facility: CLINIC | Age: 36
End: 2021-02-18

## 2021-02-24 ENCOUNTER — CONSULT (OUTPATIENT)
Dept: NEUROLOGY | Facility: CLINIC | Age: 36
End: 2021-02-24
Payer: COMMERCIAL

## 2021-02-24 VITALS
HEART RATE: 67 BPM | DIASTOLIC BLOOD PRESSURE: 88 MMHG | WEIGHT: 172.2 LBS | BODY MASS INDEX: 25.51 KG/M2 | SYSTOLIC BLOOD PRESSURE: 126 MMHG | HEIGHT: 69 IN

## 2021-02-24 DIAGNOSIS — G40.109 FOCAL EPILEPSY (HCC): ICD-10-CM

## 2021-02-24 PROCEDURE — 99245 OFF/OP CONSLTJ NEW/EST HI 55: CPT | Performed by: PSYCHIATRY & NEUROLOGY

## 2021-02-24 RX ORDER — LEVETIRACETAM 500 MG/1
1000 TABLET ORAL EVERY 12 HOURS SCHEDULED
Qty: 120 TABLET | Refills: 11 | Status: SHIPPED | OUTPATIENT
Start: 2021-02-24 | End: 2021-03-10

## 2021-02-24 RX ORDER — FOLIC ACID 0.8 MG
TABLET ORAL
COMMUNITY

## 2021-02-24 RX ORDER — MULTIVIT WITH MINERALS/LUTEIN
250 TABLET ORAL DAILY
COMMUNITY

## 2021-02-24 RX ORDER — MULTIVITAMIN
1 CAPSULE ORAL DAILY
COMMUNITY

## 2021-02-24 NOTE — PROGRESS NOTES
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, who is presenting to Neurology office for evaluation of his seizures  Assessment/Plan:    Focal epilepsy ([facility] [address] )  Based on the description of his events, these are most consistent with seizures  His prior event with loss of consciousness, whole-body shaking, and a lateral tongue bite very characteristic of a generalized tonic-clonic seizure  The smaller he event he had most recently with staring, unresponsiveness, and a chewing motion of his mouth would be concerning for a focal impaired awareness seizure with oral automatisms  Because he has had multiple events, he would meet a diagnosis of epilepsy and would benefit from staying on seizure medications going forward  he is currently on levetiracetam, and is tolerating it well, but still at a rather low dose  I will have him increase his levetiracetam to be 1000 mg twice a day  I did discuss the risks and rationale of this medication increase  -- I will also order an MRI of his brain with without contrast, seizure protocol, to evaluate for any potential source of his seizures  -- I will order routine EEG to better characterize his seizures  If this is normal, we could consider getting a sleep-deprived EEG     --I did discuss seizure first aid, seizure safety, and driving restrictions in [place]  Because of his seizure, he cannot drive for 6 months from his most recent seizure, which occurred on 1/29/2021  He will Return in about 3 months (around 5/24/2021)  Subjective:    HPI    Current seizure medications:  1  Levetiracetam 500 mg twice a day  Other medications as per Epic    Briefly reviewing his history, he had his first event in May 2020  He had worked 5 days of a 16 hour shift in a row  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, describing a feeling like "his essence was leaving"  He then lost consciousness   He fell to the ground and he was "contorting" on the floor  They called an ambulance and was combative afterwards, needing sedation to calm him down  He was seen in the ED, but was told it was just due to syncope  He had some JOHNATHON at the time and he was felt to have dehydration  Following that event, he had severe back pain  He was losing weight and he felt a bulge in his back  On 1/15/2021, he was getting less sleep to help his kids with virtual school  One day, he took a nap on the couch and his kids woke him up with blood in his mouth due to a severe tongue bite  He had very severe back pain  His kids (10yo and 7yo) stated he was "twisting and turning" and it looked like he was "punching the maris"  When he stopped moving, he just laid there for 20-30 min  He did go to the ED, and was found to compression fractures in his spine  He was started on Levetiracetam 500 mg twice a day  He denies any side effects or problems from the medication  He typically will take it with food  He had one other episode on 1/29/2021, where his wife noted that he was staring off in space, not responding, mumbling, making some chew, and was not able to walk  She started to drive him to the hospital, and he awoke in the car  Unclear duration, but likely about 10-20 min  He went back to the ED, and he was started on Levetiracetam and got him in to see our office  In retrospect, he will wake up and have no recollection of things that had happened, usually after taking a nap in the afternoon or when he wakes up from sleep in the mornings  He moves around and talks a lot while sleeping  His wife has told him that this happens in little episodes  He has a history of a thunderclap headache, and he was diagnosed with an occult SAH  He had severe migraines/headaches daily for years  He denies any trouble with migraines currently  He has used marijuana and his back felt better and his headaches were no longer a problem       He does have mild PTSD symptoms with some trouble with anger at times  He reports some trouble with word finding  Event/Seizure semiology:  1  Generalized tonic clonic seizure  Possible aura of odd sensation prior to initial event, otherwise no warning  He would shake all over and be unresponsive  Lateral tongue bite with one event  This has occurred twice, most recently on 1/15/2021  2  Possible focal impaired awareness seizure: no clear prodrome, he was staring and unresponsive, reportedly with left hand repetitive clenching and chewing motion of mouth  This occurred once on 2021    Special Features  Status epilepticus: none  Self Injury Seizures: tongue laceration, compression fracture in back  Precipitating Factors: lack of sleep    Epilepsy Risk Factors:  Abnormal pregnancy:    no  Abnormal birth/:   no  Abnormal Development:   no  Febrile seizures, simple:   no  Febrile seizures, complex:   no  CNS infection:    no  Intellectual Disability:    no  Cerebral palsy:    no  Head injury (moderate/severe):  no  CNS neoplasm:    no  CNS malformation:    no  Neurosurgical procedure:   no  Stroke:     no  Alcohol abuse:    no  Drug abuse:     no  Family history Sz/epilepsy:   no    Prior AEDs:  None other than Levetiracetam     Prior Evaluation:  - CTA head and neck: 2021:Normal CT of the brain  Normal CTA of the neck and brain  - MRI brain: none  - MRA head in the past, normal  - Routine EEG: none  - Ambulatory EEG: none  - Video EEG: none  - PET scan brain : none  - Neuropsychologic testing: none    Psychiatric History:  Depression: no  Anxiety: yes when in the [employer] and possible PTSD  Psychosis: no  Psychiatric Admissions: none    I personally reviewed his head CT from 2021, as detailed above  I reviewed prior notes including ED notes and Neurology notes from [facility], as documented in [facility]CareEverywhere, and summarized above      The following portions of the patient's history were reviewed and updated as appropriate: allergies, current medications, past family history, past medical history, past social history, past surgical history and problem list         Objective:    Blood pressure 126/88, pulse 67, height 5' 9" (1 753 m), weight 78 1 kg (172 lb 3 2 oz)  Physical Exam    Neurological Exam  GENERAL EXAMINATION:   In general patient is well appearing and in no distress  There is no peripheral edema  NEUROLOGIC EXAMINATION:     Alert and oriented to person, date, location  Fund of knowledge is full with good understanding of medical situation  Recent and remote memory were intact    Mood and affect are appropriate  Attention is intact  Language function including fluency, naming, and comprehension intact  Cranial nerves: Pupils are equal round reactive to light and accommodation  Visual Fields are full to confrontation bilaterally  Optic discs are sharp with no evidence of papilledema Extraocular movements are intact without nystagmus  Facial sensation is intact to light touch  No facial droop, face activates symmetrically  There is no dysarthria  Hearing was intact to finger rub  Tongue and uvula are midline and palate elevates symmetrically  Shoulder shrug  5/5  Motor Exam:  No pronator drift  Bulk and tone are normal  Strength is 5/5 throughout  Deep tendon reflexes: Biceps 2+, brachioradialis 2+, patellar 2+, Achilles 2+ bilaterally  Negative Torress      Sensation: Intact light touch    Coordination: Finger nose finger and heel to shin testing are without dysmetria  Gait: Negative romberg  Normal casual gait  ROS:    Review of Systems   Constitutional: Positive for appetite change and unexpected weight change (weight loss)  Negative for fever  HENT: Negative  Negative for hearing loss, tinnitus, trouble swallowing and voice change  Eyes: Negative  Negative for photophobia and pain  Respiratory: Negative  Negative for shortness of breath  Cardiovascular: Negative  Negative for palpitations  Gastrointestinal: Negative  Negative for nausea and vomiting  Endocrine: Negative  Negative for cold intolerance  Loss of sexual drive   Genitourinary: Negative  Negative for dysuria, frequency and urgency  Musculoskeletal: Positive for back pain, joint swelling and myalgias  Negative for neck pain  Skin: Negative  Negative for rash  Neurological: Positive for seizures (last estimated seizure was on/around 2/15/2021 had staring spells/confusion)  Negative for dizziness, tremors, syncope, facial asymmetry, speech difficulty, weakness, light-headedness, numbness and headaches  Memory trouble  Cramping  Tingling  Snoring   Hematological: Negative  Does not bruise/bleed easily  Psychiatric/Behavioral: Positive for agitation (mood swings), confusion and sleep disturbance (waking up at night/trouble falling asleep)  Negative for hallucinations  The patient is nervous/anxious           Depression

## 2021-02-24 NOTE — PATIENT INSTRUCTIONS
-- please increase your Levetiracetam to be 1000 mg twice a day  -- please get an MRI of your brain and an EEG before your next visit  If the EEG is normal, I may have you get a sleep deprived EEG

## 2021-02-24 NOTE — ASSESSMENT & PLAN NOTE
Based on the description of his events, these are most consistent with seizures  His prior event with loss of consciousness, whole-body shaking, and a lateral tongue bite very characteristic of a generalized tonic-clonic seizure  The smaller he event he had most recently with staring, unresponsiveness, and a chewing motion of his mouth would be concerning for a focal impaired awareness seizure with oral automatisms  Because he has had multiple events, he would meet a diagnosis of epilepsy and would benefit from staying on seizure medications going forward  he is currently on levetiracetam, and is tolerating it well, but still at a rather low dose  I will have him increase his levetiracetam to be 1000 mg twice a day  I did discuss the risks and rationale of this medication increase  -- I will also order an MRI of his brain with without contrast, seizure protocol, to evaluate for any potential source of his seizures  -- I will order routine EEG to better characterize his seizures  If this is normal, we could consider getting a sleep-deprived EEG     --I did discuss seizure first aid, seizure safety, and driving restrictions in [facility]  Because of his seizure, he cannot drive for 6 months from his most recent seizure, which occurred on 1/29/2021

## 2021-02-25 ENCOUNTER — EVALUATION (OUTPATIENT)
Dept: PHYSICAL THERAPY | Facility: CLINIC | Age: 36
End: 2021-02-25
Payer: COMMERCIAL

## 2021-02-25 DIAGNOSIS — S22.009D: Primary | ICD-10-CM

## 2021-02-25 DIAGNOSIS — S23.9XXD THORACIC BACK SPRAIN, SUBSEQUENT ENCOUNTER: ICD-10-CM

## 2021-02-25 PROCEDURE — 97140 MANUAL THERAPY 1/> REGIONS: CPT | Performed by: PHYSICAL THERAPIST

## 2021-02-25 PROCEDURE — 97535 SELF CARE MNGMENT TRAINING: CPT | Performed by: PHYSICAL THERAPIST

## 2021-02-25 PROCEDURE — 97162 PT EVAL MOD COMPLEX 30 MIN: CPT | Performed by: PHYSICAL THERAPIST

## 2021-02-25 NOTE — PROGRESS NOTES
PT Evaluation   Today's date: 2021  Patient name: Garo Suero  : 1985  MRN: [id number]  Referring provider: Christiano Levy MD  Dx:   Encounter Diagnosis     ICD-10-CM    1  Closed fracture of dorsal vertebra without spinal cord injury, with routine healing, subsequent encounter  S22 [id number]    2  Thoracic back sprain, subsequent encounter  S23  9XXD      Assessment  Assessment details: Patient displayed significant c/o pain with palpation of his thoracic region  Impairments: abnormal gait, abnormal or restricted ROM, abnormal movement, activity intolerance, impaired balance, impaired physical strength, lacks appropriate home exercise program, pain with function, safety issue, scapular dyskinesis and weight-bearing intolerance  Understanding of Dx/Px/POC: excellent  Goals  STG  2-4 weeks:   Increase lumbar spine AROM by 2-4 degrees  Increase lower extremity strength by 2-4 lbs in all weak areas  Improve sitting posture and body mechanics  Increase standing/ADL tolerance minutes  Decrease pain by 25-50% with standing, walking, and stairs  Initiate HEP  LTG  6-8 weeks:   Demonstrate normal lumbar rotation  Decrease pain to 1-2/10 with activity  Increase lower extremity strength by 10-20 lbs in all weak areas  Improve spinal stability  Improve gait pattern and balance  Decrease limitations with standing, walking and ADL's  DC with HEP  Plan  Plan details: All planned modality interventions and planned therapy interventions are provided PRN    Patient would benefit from: PT eval and skilled physical therapy  Planned modality interventions: TENS, ultrasound and unattended electrical stimulation  Planned therapy interventions: joint mobilization, manual therapy, balance/weight bearing training, balance, neuromuscular re-education, patient education, postural training, self care, coordination, strengthening, stretching, therapeutic activities, therapeutic exercise, therapeutic training, transfer training, home exercise program, graded exercise, gait training and flexibility  Frequency: 3x week  Duration in weeks: 12  Treatment plan discussed with: patient        Subjective Evaluation    Pain  Quality: discomfort, knife-like, pulling, squeezing, sharp, tight and throbbing  Aggravating factors: lifting, running and stair climbing  Progression: worsening    Treatments  Current treatment: physical therapy  Patient Goals  Patient goals for therapy: increased strength, independence with ADLs/IADLs, improved balance, increased motion and decreased pain          Objective    Date of onset:  1/14/2021    Date of Surgery:  None    History of Present Episode: 2/25/2021  Pj states he [place] last [place]  Since than he has had two more seizures  He apparently injured his thoracic spine at that time  He reports compression fractures of his thoracic spine   He now has back pain and would like therapy to help this area  Past Medical History:    2/25/2021  Pj reports epilepsy issues  Previous Level of Functional Ability:  2/25/2021  Pj states his back did not bother him before he injured himself during his seizure  Inspection / Palpation:  Lumbar:  2/25/2021  Mesomorphic body type  No signs of infection  No signs of wounds  No signs of drainage  No signs of ecchymotic regions  No signs of erythremic regions  Moderate signs of muscle spasm  Moderate signs of muscle guarding  Moderate signs of tenderness reported to palpation  Mild signs of atrophy noted  No signs of swelling  No signs of a surgery site  Current conditions appears consistent with recent acute episode  Thoracic region was very tender to palpation with his T-3 through T 10 region  Chief Complaints:  2/25/2021  Pj reports mild to moderate difficulty with standing  Pj reports mild to moderate difficulty with walking    Pj reports mild to moderate difficulty with movement / use of his lumbar region  Pj reports mild to moderate difficulty with use of stairs  Pj reports mild to moderate difficulty with running  Pj reports moderate difficulty with jumping  Pj reports mild to moderate difficulty with use of inclines  Pj reports severe difficulty with sleeping  Pj reports mild to moderate difficulty with his strength and endurance  Pj reports mild to moderate limitations with his range of motion  Pj reports moderate difficulty lying on his lumbar region  Pj reports mild to moderate difficulty twisting / turning his lumbar region      LUMBAR PAIN     Resting Palpation Bending Extending Walking Lifting   2/25/2021 0-2 2-5 5-8 2-5 2-5 4-6     LUMBAR PAIN     Pulling Pushing Sleeping Twisting Standing   2/25/2021 2-5 2-5 4-10 2-5 2-5     LUMBAR AROM Flexion Extension Rotation Right   2/25/2021 52° 5° 32°     LUMBAR AROM Rotation Left Side Bending Right Side Bending Left   2/25/2021 33° 32° 32°     LUMBAR MMT / PAIN Flexion Extension Rotation Right   2/25/2021 0/10  18 lbs 0/10  15 lbs 0/10  17 lbs     LUMBAR MMT / PAIN Rotation Left Side Bend Right Side Bend Left   2/25/2021 0/10  15 lbs 0/10  15 lbs 0/10  14 lbs     LE MMT / PAIN Dorsiflexion Plantarflexion Knee flexion Knee extension   2/25/2021 Rt 0/10  25 lbs 0/10  32 lbs 0/10  31 lbs 0/10  32 lbs   2/25/2021 Lt 0/10  26 lbs 0/10  33 lbs 0/10  32 lbs 0/10  31 lbs     LE MMT / PAIN Hip Flexion Hip Abduction Hip Adduction   2/25/2021  Rt 0/10  31 lbs 0/10  32 lbs 0/10  31 lbs   2/25/2021  Lt 0/10  30 lbs 0/10  32 lbs 0/10  32 lbs     LUMBAR SCREEN Referred Pain Sacroiliac Joint test Squish Test Straight Leg  Raise   2/25/2021 Rt Negative Negative Negative Negative   2/25/2021 Lt Negative Negative Negative Negative     LUMBAR SCREEN Valsalva Gapping Bowstring sign Hip Bursitis   2/25/2021 Rt Negative Negative Negative Negative   2/25/2021 Lt Negative Negative Negative Negative     Precautions:  Thoracic Compression Fractures and Sprain / Epilepsy    All treatments below will be provided with a focus on strengthening, flexibility, ROM, postural,   endurance and any possible swelling and pain which may be present without ignoring   neural issues involving balance, coordination and proprioception which is also important   and necessary to provide full functional mobility and quality care        Daily Treatment Log  Manual  2/25       MT, ROM 15'       HEP 15'       Exercise Log 2/25       Balance Board        Chair Squats        BOSU-Walk        Foam Pad SLR,Hip/KneeFl,Step Ups        Foam Beam        P-Bar-GT-Forward, Backward,Side-Even & Dips        Monster Steps        Fitter-Slalom        T/G-Squats,PF        W/P-PNF,IR,ER,PU,PS:Top,Mid,Bot        NuStep        Raymon-BP,Lats,PD,Row        Qpkceyd-[id number]-Xn        NK Table        TM        UBC        Bike        Stepper        ME, PE 15'               Modalities 2/25       [employer] [facility] / Shawn Bishop / Joann Martinez

## 2021-02-25 NOTE — LETTER
2021  PT Evaluation Plan of [address] [facility], [address] [facility] [address]   [facility],[address] [facility] [address]    Patient: Aurelia Johnson   YOB: 1985   Date of Visit: 2021     Encounter Diagnosis     ICD-10-CM    1  Closed fracture of dorsal vertebra without spinal cord injury, with routine healing, subsequent encounter  S22 009D    2  Thoracic back sprain, subsequent encounter  S23  9XXD        Dear Dr Romaine Barthel: Thank you for your recent referral of Aurelia Johnson  Please review the attached evaluation summary from Pj's recent visit  Please verify that you agree with the plan of care by signing the attached order  If you have any questions or concerns, please do not hesitate to call  I sincerely appreciate the opportunity to share in the care of one of your patients and hope to have another opportunity to work with you in the near future  Sincerely,    Aidee Augustin, PT      Referring Provider:      I certify that I have read the below Plan of Care and certify the need for these services furnished under this plan of treatment while under my care  Shadia Pederson MD  [facility] [address]   [facility],[address] [facility] [address]  [place] [place]          PT Evaluation   Today's date: 2021  Patient name: Aurelia Johnson  : 1985  MRN: [id number]  Referring provider: Jina Corrales MD  Dx:   Encounter Diagnosis     ICD-10-CM    1  Closed fracture of dorsal vertebra without spinal cord injury, with routine healing, subsequent encounter  S22 009D    2  Thoracic back sprain, subsequent encounter  S23  9XXD      Assessment  Assessment details: Patient displayed significant c/o pain with palpation of his thoracic region    Impairments: abnormal gait, abnormal or restricted ROM, abnormal movement, activity intolerance, impaired balance, impaired physical strength, lacks appropriate home exercise program, pain with function, safety issue, scapular dyskinesis and weight-bearing intolerance  Understanding of Dx/Px/POC: excellent  Goals  STG  2-4 weeks:   Increase lumbar spine AROM by 2-4 degrees  Increase lower extremity strength by 2-4 lbs in all weak areas  Improve sitting posture and body mechanics  Increase standing/ADL tolerance minutes  Decrease pain by 25-50% with standing, walking, and stairs  Initiate HEP  LTG  6-8 weeks:   Demonstrate normal lumbar rotation  Decrease pain to 1-2/10 with activity  Increase lower extremity strength by 10-20 lbs in all weak areas  Improve spinal stability  Improve gait pattern and balance  Decrease limitations with standing, walking and ADL's  DC with HEP  Plan  Plan details: All planned modality interventions and planned therapy interventions are provided PRN    Patient would benefit from: PT eval and skilled physical therapy  Planned modality interventions: TENS, ultrasound and unattended electrical stimulation  Planned therapy interventions: joint mobilization, manual therapy, balance/weight bearing training, balance, neuromuscular re-education, patient education, postural training, self care, coordination, strengthening, stretching, therapeutic activities, therapeutic exercise, therapeutic training, transfer training, home exercise program, graded exercise, gait training and flexibility  Frequency: 3x week  Duration in weeks: 12  Treatment plan discussed with: patient        Subjective Evaluation    Pain  Quality: discomfort, knife-like, pulling, squeezing, sharp, tight and throbbing  Aggravating factors: lifting, running and stair climbing  Progression: worsening    Treatments  Current treatment: physical therapy  Patient Goals  Patient goals for therapy: increased strength, independence with ADLs/IADLs, improved balance, increased motion and decreased pain          Objective    Date of onset:  1/14/2021    Date of Surgery:  None    History of Present Episode: 2/25/2021  Pj states he had a [place] seizure last year  Since than he has had two more seizures  He apparently injured his thoracic spine at that time  He reports compression fractures of his thoracic spine   He now has back pain and would like therapy to help this area  Past Medical History:    2/25/2021  Pj reports epilepsy issues  Previous Level of Functional Ability:  2/25/2021  Pj states his back did not bother him before he injured himself during his seizure  Inspection / Palpation:  Lumbar:  2/25/2021  Mesomorphic body type  No signs of infection  No signs of wounds  No signs of drainage  No signs of ecchymotic regions  No signs of erythremic regions  Moderate signs of muscle spasm  Moderate signs of muscle guarding  Moderate signs of tenderness reported to palpation  Mild signs of atrophy noted  No signs of swelling  No signs of a surgery site  Current conditions appears consistent with recent acute episode  Thoracic region was very tender to palpation with his T-3 through T 10 region  Chief Complaints:  2/25/2021  Pj reports mild to moderate difficulty with standing  Pj reports mild to moderate difficulty with walking  Pj reports mild to moderate difficulty with movement / use of his lumbar region  Pj reports mild to moderate difficulty with use of stairs  Pj reports mild to moderate difficulty with running  Pj reports moderate difficulty with jumping  Pj reports mild to moderate difficulty with use of inclines  Pj reports severe difficulty with sleeping  Pj reports mild to moderate difficulty with his strength and endurance  Pj reports mild to moderate limitations with his range of motion  Pj reports moderate difficulty lying on his lumbar region  Pj reports mild to moderate difficulty twisting / turning his lumbar region      LUMBAR PAIN     Resting Palpation Bending Extending Walking Lifting   2/25/2021 0-2 2-5 5-8 2-5 2-5 4-6     LUMBAR PAIN Pulling Pushing Sleeping Twisting Standing   2/25/2021 2-5 2-5 4-10 2-5 2-5     LUMBAR AROM Flexion Extension Rotation Right   2/25/2021 52° 5° 32°     LUMBAR AROM Rotation Left Side Bending Right Side Bending Left   2/25/2021 33° 32° 32°     LUMBAR MMT / PAIN Flexion Extension Rotation Right   2/25/2021 0/10  18 lbs 0/10  15 lbs 0/10  17 lbs     LUMBAR MMT / PAIN Rotation Left Side Bend Right Side Bend Left   2/25/2021 0/10  15 lbs 0/10  15 lbs 0/10  14 lbs     LE MMT / PAIN Dorsiflexion Plantarflexion Knee flexion Knee extension   2/25/2021 Rt 0/10  25 lbs 0/10  32 lbs 0/10  31 lbs 0/10  32 lbs   2/25/2021 Lt 0/10  26 lbs 0/10  33 lbs 0/10  32 lbs 0/10  31 lbs     LE MMT / PAIN Hip Flexion Hip Abduction Hip Adduction   2/25/2021  Rt 0/10  31 lbs 0/10  32 lbs 0/10  31 lbs   2/25/2021  Lt 0/10  30 lbs 0/10  32 lbs 0/10  32 lbs     LUMBAR SCREEN Referred Pain Sacroiliac Joint test Squish Test Straight Leg  Raise   2/25/2021 Rt Negative Negative Negative Negative   2/25/2021 Lt Negative Negative Negative Negative     LUMBAR SCREEN Valsalva Gapping Bowstring sign Hip Bursitis   2/25/2021 Rt Negative Negative Negative Negative   2/25/2021 Lt Negative Negative Negative Negative     Precautions:  Thoracic Compression Fractures and Sprain / Epilepsy    All treatments below will be provided with a focus on strengthening, flexibility, ROM, postural,   endurance and any possible swelling and pain which may be present without ignoring   neural issues involving balance, coordination and proprioception which is also important   and necessary to provide full functional mobility and quality care        Daily Treatment Log  Manual  2/25       MT, ROM 15'       HEP 15'       Exercise Log 2/25       Balance Board        Chair Squats        BOSU-Walk        Foam Pad SLR,Hip/KneeFl,Step Ups        Foam Beam        P-Bar-GT-Forward, Backward,Side-Even & Dips        Monster Steps        Fitter-Slalom        T/G-Squats,PF W/P-PNF,IR,ER,PU,PS:Top,Mid,Bot        NuStep        Raymon-BP,Lats,PD,Row        [id number]        NK Table        TM        UBC        Bike        Stepper        ME, PE 15'               Modalities 2/25       [facility] [place] [facility]

## 2021-03-01 ENCOUNTER — OFFICE VISIT (OUTPATIENT)
Dept: PHYSICAL THERAPY | Facility: CLINIC | Age: 36
End: 2021-03-01
Payer: COMMERCIAL

## 2021-03-01 DIAGNOSIS — S22.009D: Primary | ICD-10-CM

## 2021-03-01 DIAGNOSIS — S23.9XXD THORACIC BACK SPRAIN, SUBSEQUENT ENCOUNTER: ICD-10-CM

## 2021-03-01 PROCEDURE — 97140 MANUAL THERAPY 1/> REGIONS: CPT | Performed by: PHYSICAL THERAPIST

## 2021-03-01 PROCEDURE — 97112 NEUROMUSCULAR REEDUCATION: CPT | Performed by: PHYSICAL THERAPIST

## 2021-03-01 PROCEDURE — 97110 THERAPEUTIC EXERCISES: CPT | Performed by: PHYSICAL THERAPIST

## 2021-03-01 NOTE — PROGRESS NOTES
Today's date: 3/1/2021  Patient name: Nancie Naylor  : 1985  MRN: [id number]  Referring provider: Telly West MD  Dx:   Encounter Diagnosis     ICD-10-CM    1  Closed fracture of dorsal vertebra without spinal cord injury, with routine healing, subsequent encounter  S22 009D    2  Thoracic back sprain, subsequent encounter  S23  9XXD      Subjective:  Pj states his back feels about the same  No flare up from the evaluation  Objective: See treatment log below  Jeff Perla continues to be advised to follow his home exercise program as tolerated  When Pj is feeling good he follows his rehab exercises in the gym and on other days he follows his home exercise program at home as tolerated  In the future we plan on having Pj stay at home and follow his home exercise program for four to six weeks and than assess for either more Rehab treatments or discharge from Rehab care  Assessment: Tolerated treatment well  Patient exhibited good technique with therapeutic exercises and would benefit from continued PT  Pj's goals are to continue to improve with his rehab program and improve with functional mobility, speed, repetition and decreased c/o pain with his gym and home exercise program   Pj's final goal for his rehab is to be discharged from Rehab care after obtaining his full functional rehab potential     Plan: Continue per plan of care  Progress treatment as tolerated  Precautions:  Thoracic Compression Fractures and Sprain / Epilepsy    All treatments below will be provided with a focus on strengthening, flexibility, ROM, postural,   endurance and any possible swelling and pain which may be present without ignoring   neural issues involving balance, coordination and proprioception which is also important   and necessary to provide full functional mobility and quality care        Daily Treatment Log  Manual  2/25 3/1      MT, ROM 15' 25'      HEP 15'       Exercise Log 2/25 3/1 Balance Board        Chair Squats        BOSU-Walk        Foam Pad SLR,Hip/KneeFl,Step Ups        Foam Beam        P-Bar-GT-Forward, Backward,Side-Even & Dips        Monster Steps        Fitter-Slalom        T/G-Squats,PF        W/P-PNF,IR,ER,PU,PS:Top,Mid,Bot        NuStep  10' L1 Start      Raymon-BP,Lats,PD,Row        [id number]        NK Table        TM        UBC  10'      Bike  10'      Stepper        ME, PE 13' 15'              Modalities 2/25 3/1      MH / PE / ES  21'      US

## 2021-03-04 ENCOUNTER — OFFICE VISIT (OUTPATIENT)
Dept: PHYSICAL THERAPY | Facility: CLINIC | Age: 36
End: 2021-03-04
Payer: COMMERCIAL

## 2021-03-04 DIAGNOSIS — S23.9XXD THORACIC BACK SPRAIN, SUBSEQUENT ENCOUNTER: ICD-10-CM

## 2021-03-04 DIAGNOSIS — S22.009D: Primary | ICD-10-CM

## 2021-03-04 PROCEDURE — 97014 ELECTRIC STIMULATION THERAPY: CPT

## 2021-03-04 PROCEDURE — 97140 MANUAL THERAPY 1/> REGIONS: CPT

## 2021-03-04 PROCEDURE — 97110 THERAPEUTIC EXERCISES: CPT

## 2021-03-04 NOTE — PROGRESS NOTES
Daily Note     Today's date: 3/4/2021  Patient name: Minal Tenoroi  : 1985  MRN: [id number]  Referring provider: Rick Young MD  Dx:   Encounter Diagnosis     ICD-10-CM    1  Closed fracture of dorsal vertebra without spinal cord injury, with routine healing, subsequent encounter  S22 009D    2  Thoracic back sprain, subsequent encounter  S23  9XXD        Start Time: 1600  Stop Time: 1228  Total time in clinic (min): 65 minutes    Subjective: Pt continues with mainly thoracic discomfort; B       Objective: See treatment diary below      Assessment: Tolerated treatment well  Patient exhibited good technique with therapeutic exercises      Plan: Continue per plan of care          Precautions:  Thoracic Compression Fractures and Sprain / Epilepsy     All treatments below will be provided with a focus on strengthening, flexibility, ROM, postural,   endurance and any possible swelling and pain which may be present without ignoring   neural issues involving balance, coordination and proprioception which is also important   and necessary to provide full functional mobility and quality care        Daily Treatment Log  Manual  2/25 3/1 3/4       MT, ROM 15' 25'    15'       HEP 15'           Exercise Log 2/25 3/1  3/4       Balance Board             Chair Squats             BOSU-Walk             Foam Pad SLR,Hip/KneeFl,Step Ups             Foam Beam             P-Bar-GT-Forward, Backward,Side-Even & Dips             Monster Steps             Fitter-Slalom             T/G-Squats,PF             W/P-PNF,IR,ER,PU,PS:Top,Mid,Bot             NuStep   10' L1 Start  10' L1       Raymon-BP,Lats,PD,Row             [id number]             NK Table             TM             UBC   8'  10'       Bike   10'  10'       Stepper             ME, PE 13' 13' 15'                     Modalities 2/25 3/1  3/4       MH / PE / ES   21'  20'

## 2021-03-05 ENCOUNTER — TELEPHONE (OUTPATIENT)
Dept: MRI IMAGING | Facility: HOSPITAL | Age: 36
End: 2021-03-05

## 2021-03-05 NOTE — TELEPHONE ENCOUNTER
Pt called stating that his pain level is now 6/10 pain and would like to know what can be done     Pt can be reached at [phone]

## 2021-03-08 ENCOUNTER — OFFICE VISIT (OUTPATIENT)
Dept: PAIN MEDICINE | Facility: CLINIC | Age: 36
End: 2021-03-08
Payer: COMMERCIAL

## 2021-03-08 VITALS
HEART RATE: 67 BPM | SYSTOLIC BLOOD PRESSURE: 102 MMHG | TEMPERATURE: 97.2 F | BODY MASS INDEX: 25.48 KG/M2 | RESPIRATION RATE: 20 BRPM | DIASTOLIC BLOOD PRESSURE: 72 MMHG | WEIGHT: 172 LBS | HEIGHT: 69 IN

## 2021-03-08 DIAGNOSIS — M42.00 SCHEUERMANN DISEASE: ICD-10-CM

## 2021-03-08 DIAGNOSIS — M54.6 THORACIC BACK PAIN, UNSPECIFIED BACK PAIN LATERALITY, UNSPECIFIED CHRONICITY: Primary | ICD-10-CM

## 2021-03-08 PROCEDURE — 99214 OFFICE O/P EST MOD 30 MIN: CPT | Performed by: ANESTHESIOLOGY

## 2021-03-08 NOTE — PATIENT INSTRUCTIONS
Spinal Cord Stimulator Placement   WHAT YOU NEED TO KNOW:   What do I need to know about spinal cord stimulator placement? A spinal cord stimulator (SCS) is a device used to control pain after other treatments have not worked  The SCS delivers a small amount of electrical current to your spinal cord to block pain  SCS placement surgery is done in 2 stages  In the first stage, a temporary SCS is placed and left in for about a week  In the second stage, a permanent SCS is placed if the temporary device reduced your pain  You will get a remote control to turn the pulse generator on and off and adjust the pulses  How do I prepare for surgery? · Your surgeon will tell you how to prepare  He or she may tell you not to eat or drink anything after midnight on the day of surgery  Arrange to have someone drive you home when you are discharged from the hospital     · Tell your surgeon about all medicines you currently take  Be sure to include any medicine that may cause you to bleed more, such as aspirin or blood thinners  Your surgeon will tell you if you need to stop any of your medicine for the surgery, and when to stop  He or she will tell you which medicines to take or not take on the day of surgery  · Tell your surgeon if you have a blood disorder or had a bleeding problem  · You may need blood or urine tests to check for infection and make sure your body is okay for surgery  You may also need an MRI to check your spine before your healthcare provider places the SCS  Ask your surgeon for more information about these and other tests you may need  What will happen during surgery? · Temporary lead placement:      ? You may get general anesthesia to keep you asleep during surgery  You may get local anesthesia to numb the surgery area  Local anesthesia allows you to stay awake during the surgery so you can tell your surgeon when your pain decreases   This helps him or her make sure the SCS is in the best spot     ? Your surgeon will place electrical leads through a small incision or a needle inserted into your back  He or she may need to remove a small piece of bone to insert the leads along your spine  He or she will use an x-ray to make sure the leads are in the correct place  ? The incision will be covered with bandages  The leads will be connected to wires and attached to a pulse generator placed outside your body  · Permanent lead placement:      ? Your surgeon will remove the temporary lead and replace it with a new, permanent lead through an incision or needle in your back  ? He or she will make another incision in your abdomen or buttocks  Then he or she will make a small pocket under your skin and place the SCS  Wires will be attached to the SCS  The wires will be tunneled under your skin  Your surgeon will connect the wires to the leads placed in your spine  ? The incisions will be closed with stitches and covered with a bandage  What should I expect after surgery? You will be taken to a room to rest until you are fully awake  Healthcare providers will monitor you closely for any problems  Do not get out of bed until your healthcare provider says it is okay  · SCS settings  on the remote control can be changed to help relieve your pain  Your healthcare provider will show you how to adjust the settings  · Medicines  may be given for surgery pain or to prevent a bacterial infection  · Ice packs  may be used to decrease the pain from the incisions  What are the risks of spinal cord stimulator placement? The spinal cord stimulator may not work correctly and you may need to have it replaced  You may develop a headache, or your pain may get worse  Surgery may cause you to bleed or to leak spinal fluid  After surgery, you may get an infection at the incision site  You may also get a serious infection near where the leads are placed   This may cause paralysis or become life-threatening  CARE AGREEMENT:   You have the right to help plan your care  Learn about your health condition and how it may be treated  Discuss treatment options with your healthcare providers to decide what care you want to receive  You always have the right to refuse treatment  The above information is an  only  It is not intended as medical advice for individual conditions or treatments  Talk to your doctor, nurse or pharmacist before following any medical regimen to see if it is safe and effective for you  © Copyright [address] Information is for End User's use only and may not be sold, redistributed or otherwise used for commercial purposes   All illustrations and images included in CareNotes® are the copyrighted property of [employer]  or [address] [place] [address]

## 2021-03-08 NOTE — PROGRESS NOTES
Assessment  1  Thoracic back pain, unspecified back pain laterality, unspecified chronicity - Bilateral    Chronic axial mid back pain described primarily arthritic features with minimal intercostal nerve radiation  X-ray shows a slight compression deformity of T8 and T10 vertebral bodies  History consistent with chronic compression deformity as patient reports having fallen secondary to seizure 7 months ago  He  Continues physical therapy and with taking NSAIDs; however,  Has not noted significant benefit with these modalities  Recent epidural steroid injection provided greater than 60% relief and improved ability to participate with independent activities of daily living for approximately 3 and half weeks  Counseled patient additionally regarding spinal cord stimulation as well as additional epidurals to treat his pain  Recommended  [place]/kyphologic brace; Reasonable at this time to employ multimodal pain therapy approach in treating this patient's pain  Plan  - patient to call back to schedule interlaminar epidural steroid injection at  T9-T10  - information regarding Scheuermann's disease given;  Handouts printed  - will reach out to brace rep this week  - information regarding SCS trial provided  All questions answered  Risks, benefits and alternatives discussed  -PT for compression deformity for paraspinal muscle strengthening  -Celebrex 200 mg b i d  Prescribed  Patient educated not to take other NSAIDs with this medication as well as bleeding complications  There are risks associated with opioid medications, including dependence, addiction and tolerance  The patient understands and agrees to use these medications only as prescribed  Potential side effects of the medications include, but are not limited to, constipation, drowsiness, addiction, impaired judgment and risk of fatal overdose if not taken as prescribed   The patient was warned against driving while taking sedation medications  Sharing medications is a felony  At this point in time, the patient is showing no signs of addiction, abuse, diversion or suicidal ideation  [place] Prescription Drug Monitoring Program report was reviewed and was appropriate     Complete risks and benefits including bleeding, infection, tissue reaction, nerve injury and allergic reaction were discussed  The approach was demonstrated using models and literature was provided  Verbal and written consent was obtained  My impressions and treatment recommendations were discussed in detail with the patient who verbalized understanding and had no further questions  Discharge instructions were provided  I personally saw and examined the patient and I agree with the above discussed plan of care  No orders of the defined types were placed in this encounter  History of Present Illness    Fany Jorgensen is a 38 y o  male with past medical history of seizure disorder on Keppra and diazepam, presenting with axial mid back pain for the better part of 7 months  He reported reports having had a seizure and falling 7 months ago and since having intense midback pain that is described by primarily aching, nagging, indolent, stabbing features  Sneezing and coughing as well as walking or extending his back seem to exacerbate the pain  He describes 8/10 consistent pain isolated to the T8 and T10 regions of his midback with minimal radiation into the intercostal nerve distribution bilaterally  The pain significantly compromises independent activities of daily living and overall function  He has not yet participated in physical therapy and otherwise has been taking Tylenol and ibuprofen with modest benefit  He  continues physical therapy and with taking NSAIDs; however,  He has not noted significant benefit with these modalities     Recent epidural steroid injection provided greater than 60% relief and improved ability to participate with independent activities of daily living for approximately 3 and half weeks  Previous medial branch blocks did not provide significant benefit  He denies any weakness numbness or paresthesias or any bowel or bladder abnormality secondary to this pain  I have personally reviewed and/or updated the patient's past medical history, past surgical history, family history, social history, current medications, allergies, and vital signs today  Review of Systems   Constitutional: Positive for activity change  HENT: Negative  Eyes: Negative  Respiratory: Negative  Cardiovascular: Negative  Gastrointestinal: Negative  Endocrine: Negative  Genitourinary: Negative  Musculoskeletal: Positive for arthralgias, back pain and myalgias  Skin: Negative  Allergic/Immunologic: Negative  Hematological: Negative  Psychiatric/Behavioral: Negative  All other systems reviewed and are negative        Patient Active Problem List   Diagnosis    Thoracic compression fracture, with delayed healing, subsequent encounter    Thoracic back pain    [facility]       Past Medical History:   Diagnosis Date    Anxiety     [facility] bleed ([facility] ) 2012    GERD (gastroesophageal reflux disease)     Migraine     Seizures ([facility] )        Past Surgical History:   Procedure Laterality Date    EPIDURAL BLOCK INJECTION N/A 2/11/2021    Procedure: T9 T10 INTERLAMINAR THORACIC EPIDURAL STEROID INJECTION;  Surgeon: Gissel Sims MD;  [facility]: [facility];  Service: Pain Management     FINGER SURGERY Right     5th digit    NERVE BLOCK Bilateral 1/21/2021    Procedure: T7 T8 T9 T10 MEDIAL BRANCH BLOCK #1;  Surgeon: Gissel Sims MD;  Location:  ENDO;  Service: Pain Management     WISDOM TOOTH EXTRACTION         Family History   Problem Relation Age of Onset    Thyroid disease Mother     Heart disease Father     Hypertension Father     No Known Problems Brother     Cancer Maternal Grandmother     No Known Problems Maternal Grandfather     Parkinsonism Paternal Grandmother     Heart disease Paternal Grandfather     Other Son         blounts disease    No Known Problems Daughter     Seizures Neg Hx        Social History     Occupational History    Not on file   Tobacco Use    Smoking status: Light Tobacco Smoker    Smokeless tobacco: Never Used   Substance and Sexual Activity    Alcohol use: Yes     Frequency: Monthly or less     Drinks per session: 1 or 2    Drug use: Yes     Types: Marijuana    Sexual activity: Not on file       Current Outpatient Medications on File Prior to Visit   Medication Sig    ascorbic acid (VITAMIN C) 250 mg tablet Take 250 mg by mouth daily    b complex vitamins tablet Take 1 tablet by mouth daily    levETIRAcetam (KEPPRA) 500 mg tablet Take 2 tablets (1,000 mg total) by mouth every 12 (twelve) hours    Magnesium 500 MG CAPS Take by mouth    Multiple Vitamin (multivitamin) capsule Take 1 capsule by mouth daily    celecoxib (CeleBREX) 200 mg capsule TAKE 1 CAPSULE BY MOUTH TWICE A DAY (Patient not taking: Reported on 3/8/2021)     No current facility-administered medications on file prior to visit  Allergies   Allergen Reactions    Sumatriptan Rash     Burning sensation   skin gets warm  skin gets warm           Physical Exam    /72   Pulse 67   Temp (!) 97 2 °F (36 2 °C)   Resp 20   Ht 5' 9" (1 753 m)   Wt 78 kg (172 lb)   BMI 25 40 kg/m²     Constitutional: normal, well developed, well nourished, alert, in no distress and non-toxic and no overt pain behavior  Eyes: anicteric  HEENT: grossly intact  Neck: supple, symmetric, trachea midline and no masses   Pulmonary:even and unlabored  Cardiovascular:No edema or pitting edema present  Skin:Normal without rashes or lesions and well hydrated  Psychiatric:Mood and affect appropriate  Neurologic:Cranial Nerves II-XII grossly intact Sensation grossly intact; no clonus negative griffiths's  Reflexes 2+ and brisk   SLR negative bilaterally  Musculoskeletal:normal gait  Normal heel toe and tip toe walking  5/5 strength in all active range of motion movements bilaterally  Significant pain with thoracic facet loading bilaterally and with lateral spine rotation  TTP over thoracic paraspinal muscles around areas of T8 and T10 vertebral body  Tenderness to palpation over spinous processes as well  Negative magdy's test, negative gaenslen's negative SIJ loading bilaterally  Imaging    THORACIC SPINE     INDICATION:   back pain      COMPARISON:  None     VIEWS:  XR SPINE THORACIC 3 VW  Images: 4     FINDINGS:     Slight loss in axial height with slight anterior wedge deformity of T10 and T8 of unknown age      Otherwise normal thoracic kyphosis  No significant scoliosis      No significant degenerative changes       Visualized lungs are clear       The pedicles appear intact         IMPRESSION:  Very slight anterior wedge deformities of T8 and T10 of unknown age  Consider MRI if an acute fractures suspected clinically

## 2021-03-09 ENCOUNTER — OFFICE VISIT (OUTPATIENT)
Dept: PHYSICAL THERAPY | Facility: CLINIC | Age: 36
End: 2021-03-09
Payer: COMMERCIAL

## 2021-03-09 DIAGNOSIS — S23.9XXD THORACIC BACK SPRAIN, SUBSEQUENT ENCOUNTER: ICD-10-CM

## 2021-03-09 DIAGNOSIS — S22.009D: Primary | ICD-10-CM

## 2021-03-09 PROCEDURE — 97110 THERAPEUTIC EXERCISES: CPT

## 2021-03-09 PROCEDURE — 97140 MANUAL THERAPY 1/> REGIONS: CPT

## 2021-03-09 PROCEDURE — 97112 NEUROMUSCULAR REEDUCATION: CPT

## 2021-03-09 NOTE — PROGRESS NOTES
Daily Note     Today's date: 3/9/2021  Patient name: Estefania Shafer  : 1985  MRN: [id number]  Referring provider: Joe Williamson MD  Dx:   Encounter Diagnosis     ICD-10-CM    1  Closed fracture of dorsal vertebra without spinal cord injury, with routine healing, subsequent encounter  S22 009D    2  Thoracic back sprain, subsequent encounter  S23  9XXD        Start Time: 1600  Stop Time: 1655  Total time in clinic (min): 55 minutes    Subjective: Patient reported improvement in Upper t/s pain STM to particularly below L scapula  Objective: See treatment diary below      Assessment: Responded well to added thoracic expansion stretch  Tolerated treatment well  Patient exhibited good technique with therapeutic exercises      Plan: Continue per plan of care          Precautions:  Thoracic Compression Fractures and Sprain / Epilepsy     All treatments below will be provided with a focus on strengthening, flexibility, ROM, postural,   endurance and any possible swelling and pain which may be present without ignoring   neural issues involving balance, coordination and proprioception which is also important   and necessary to provide full functional mobility and quality care        Daily Treatment Log  Manual  2/25 3/1 3/4  3/9     MT, ROM 15' 25'  15'  15'     HEP 15'           Exercise Log 2/25 3/1  3/4  3/9     Balance Board             Chair Squats             BOSU-Walk             Foam Pad SLR,Hip/KneeFl,Step Ups             Foam Beam             P-Bar-GT-Forward, Backward,Side-Even & Dips             Monster Steps             Fitter-Slalom             T/G-Squats,PF             W/P-PNF,IR,ER,PU,PS:Top,Mid,Bot             NuStep   10' L1 Start  10' L1  10' L1     Raymon-BP,Lats,PD,Row             Dojepxq-RP-Ho             NK Table             TM             UBC   8'  10'  10'     Bike   10'  10'  10'     Stepper             ME, PE 13' 15' 15'  15'     Sidelying Thoracic Extension       5"x10   Modalities 2/25 3/1  3/4  3/9     MH / PE / ES   20'  20'  np resume     US

## 2021-03-10 ENCOUNTER — HOSPITAL ENCOUNTER (OUTPATIENT)
Dept: MRI IMAGING | Facility: HOSPITAL | Age: 36
Discharge: HOME | End: 2021-03-10
Attending: PSYCHIATRY & NEUROLOGY
Payer: COMMERCIAL

## 2021-03-10 DIAGNOSIS — G40.109 FOCAL EPILEPSY (HCC): ICD-10-CM

## 2021-03-10 PROCEDURE — 70553 MRI BRAIN STEM W/O & W/DYE: CPT

## 2021-03-10 PROCEDURE — G1004 CDSM NDSC: HCPCS

## 2021-03-10 PROCEDURE — A9585 GADOBUTROL INJECTION: HCPCS | Performed by: PSYCHIATRY & NEUROLOGY

## 2021-03-10 RX ORDER — LEVETIRACETAM 500 MG/1
1000 TABLET ORAL EVERY 12 HOURS SCHEDULED
Qty: 360 TABLET | Refills: 3 | Status: SHIPPED | OUTPATIENT
Start: 2021-03-10 | End: 2021-12-28 | Stop reason: ALTCHOICE

## 2021-03-10 RX ADMIN — GADOBUTROL 8 ML: 604.72 INJECTION INTRAVENOUS at 11:55

## 2021-03-11 ENCOUNTER — HOSPITAL ENCOUNTER (OUTPATIENT)
Dept: NEUROLOGY | Facility: HOSPITAL | Age: 36
Discharge: HOME | End: 2021-03-11
Attending: PSYCHIATRY & NEUROLOGY
Payer: COMMERCIAL

## 2021-03-11 DIAGNOSIS — G40.109 FOCAL EPILEPSY (HCC): ICD-10-CM

## 2021-03-11 PROCEDURE — 95816 EEG AWAKE AND DROWSY: CPT

## 2021-03-11 PROCEDURE — 95819 EEG AWAKE AND ASLEEP: CPT | Performed by: STUDENT IN AN ORGANIZED HEALTH CARE EDUCATION/TRAINING PROGRAM

## 2021-03-12 ENCOUNTER — OFFICE VISIT (OUTPATIENT)
Dept: PHYSICAL THERAPY | Facility: CLINIC | Age: 36
End: 2021-03-12
Payer: COMMERCIAL

## 2021-03-12 ENCOUNTER — TELEPHONE (OUTPATIENT)
Dept: PAIN MEDICINE | Facility: CLINIC | Age: 36
End: 2021-03-12

## 2021-03-12 DIAGNOSIS — S23.9XXD THORACIC BACK SPRAIN, SUBSEQUENT ENCOUNTER: ICD-10-CM

## 2021-03-12 DIAGNOSIS — S22.009D: Primary | ICD-10-CM

## 2021-03-12 PROCEDURE — 97112 NEUROMUSCULAR REEDUCATION: CPT

## 2021-03-12 PROCEDURE — 97140 MANUAL THERAPY 1/> REGIONS: CPT

## 2021-03-12 NOTE — PROGRESS NOTES
Today's date: 3/12/2021  Patient name: Barak Diallo  : 1985  MRN: [id number]  Referring provider: Aminata Gutierrez MD  Dx:   Encounter Diagnosis     ICD-10-CM    1  Closed fracture of dorsal vertebra without spinal cord injury, with routine healing, subsequent encounter  S22 009D    2  Thoracic back sprain, subsequent encounter  S23  9XXD      Subjective:  No new c/o pain today  Objective: See treatment log below  Betsey Bass continues to be advised to follow his home exercise program as tolerated  When Pj is feeling good he follows his rehab exercises in the gym and on other days he follows his home exercise program at home as tolerated  In the future we plan on having Pj stay at home and follow his home exercise program for four to six weeks and than assess for either more Rehab treatments or discharge from Rehab care  Assessment: Tolerated treatment well  Patient exhibited good technique with therapeutic exercises and would benefit from continued PT  Pj's goals are to continue to improve with his rehab program and improve with functional mobility, speed, repetition and decreased c/o pain with his gym and home exercise program   Pj's final goal for his rehab is to be discharged from Rehab care after obtaining his full functional rehab potential     Plan: Continue per plan of care  Progress treatment as tolerated         Precautions:  Thoracic Compression Fractures and Sprain / Epilepsy     All treatments below will be provided with a focus on strengthening, flexibility, ROM, postural,   endurance and any possible swelling and pain which may be present without ignoring   neural issues involving balance, coordination and proprioception which is also important   and necessary to provide full functional mobility and quality care        Daily Treatment Log  Manual  2/25 3/1 3/4  3/9  3/12   MT, ROM 15' 25'  15'  15'  20'   HEP 15'           Exercise Log 2/25 3/1  3/4  3/9  3/12   Balance Board             Chair Squats             BOSU-Walk             Foam Pad SLR,Hip/KneeFl,Step Ups             Foam Beam             P-Bar-OAL          10x   Monster Steps             Fitter-Slalom             T/G-Squats,PF             W/P-PNF,IR,ER,PU,PS:Top,Mid,Bot          10# All 10x   NuStep   10' L1 Start  10' L1  10' L1  10' L1   Raymon-BP,Lats,PD,Row             Wil[place]zl-YM-Vv          2x2'   NK Table             Wall Slides ITVY          2x10   UBC   10'  10'  10'  10'   Bike   10'  10'  10'  10'   Stepper             ME, PE 13' 15' 15'  15'  15'   Sidelying Thoracic Extension       5"x10     Modalities 2/25 3/1  3/4  3/9  3/12   MH / PE / ES   20'  20'  np resume     US

## 2021-03-12 NOTE — TELEPHONE ENCOUNTER
Patient calling to get [place] for [place]/kyphologic brace per physician notes    Please advise,    Thank you    [phone] denies pain/discomfort

## 2021-03-12 NOTE — TELEPHONE ENCOUNTER
S/w pt and states rep called him after he reached out to [address]  Pt does not need anything further

## 2021-03-15 ENCOUNTER — OFFICE VISIT (OUTPATIENT)
Dept: PHYSICAL THERAPY | Facility: CLINIC | Age: 36
End: 2021-03-15
Payer: COMMERCIAL

## 2021-03-15 DIAGNOSIS — S22.009D: Primary | ICD-10-CM

## 2021-03-15 DIAGNOSIS — S23.9XXD THORACIC BACK SPRAIN, SUBSEQUENT ENCOUNTER: ICD-10-CM

## 2021-03-15 PROCEDURE — 97112 NEUROMUSCULAR REEDUCATION: CPT

## 2021-03-15 PROCEDURE — 97140 MANUAL THERAPY 1/> REGIONS: CPT

## 2021-03-15 NOTE — PROGRESS NOTES
Today's date: 3/15/2021  Patient name: Lolita West  : 1985  MRN: [id number]  Referring provider: Sha Prescott MD  Dx:   Encounter Diagnosis     ICD-10-CM    1  Closed fracture of dorsal vertebra without spinal cord injury, with routine healing, subsequent encounter  S22 009D    2  Thoracic back sprain, subsequent encounter  S23  9XXD      Subjective:  No new c/o pain today  Objective: See treatment log below  Jeff Burrows continues to be advised to follow his home exercise program as tolerated  When Pj is feeling good he follows his rehab exercises in the gym and on other days he follows his home exercise program at home as tolerated  In the future we plan on having Pj stay at home and follow his home exercise program for four to six weeks and than assess for either more Rehab treatments or discharge from Rehab care  Assessment: Tolerated treatment well  Patient exhibited good technique with therapeutic exercises and would benefit from continued PT  Pj's goals are to continue to improve with his rehab program and improve with functional mobility, speed, repetition and decreased c/o pain with his gym and home exercise program   Pj's final goal for his rehab is to be discharged from Rehab care after obtaining his full functional rehab potential     Plan: Continue per plan of care  Progress treatment as tolerated         Precautions:  Thoracic Compression Fractures and Sprain / Epilepsy     All treatments below will be provided with a focus on strengthening, flexibility, ROM, postural,   endurance and any possible swelling and pain which may be present without ignoring   neural issues involving balance, coordination and proprioception which is also important   and necessary to provide full functional mobility and quality care        Daily Treatment Log  Manual  3/15     3/12   MT, ROM 30'     20'   HEP         Exercise Log 3/15     3/12   Balance Board          Chair Squats        BOSU-Walk          Foam Pad SLR,Hip/KneeFl,Step Ups          Foam Beam          P-Bar-OAL  10x     10x   Monster Steps          Fitter-Slalom          T/G-Squats,PF          W/P-PNF,IR,ER,PU,PS:Top,Mid,Bot  10# 30x     10# All 10x   NuStep  10' L1     10' L1   Raymon-BP,Lats,PD,Row          Euciwso-FE-Zy  2x2'     2x2'   NK Table          Wall Slides ITVY  2x10     2x10   UBC  10'     10'   Bike  10'     10'   Stepper          ME, PE 15'     15'   Sidelying Thoracic Extension          Modalities 3/15     3/12   MH / PE / ES          US

## 2021-03-16 ENCOUNTER — TELEPHONE (OUTPATIENT)
Dept: NEUROLOGY | Facility: CLINIC | Age: 36
End: 2021-03-16

## 2021-03-16 DIAGNOSIS — G40.109 FOCAL EPILEPSY (HCC): Primary | ICD-10-CM

## 2021-03-16 NOTE — TELEPHONE ENCOUNTER
Spoke to patient regarding EEG results and new recommendations  Patient verbalized understanding and agreeable to plan  Transferred to  to schedule  Roosevelt Huffmanm - patients wife wanted me to make you aware she has noticed since dose increase, patient has become more irritable, appetite has decreased (only eating 1 meal a day) and also that patient has had 2 focal seizures  Please advise if any further recommendations

## 2021-03-16 NOTE — TELEPHONE ENCOUNTER
----- Message from Mike Waldrop MD sent at 3/16/2021 12:47 PM EDT -----  Please call patient about recent routine EEG result, which was normal  As per our discussion in the office, it would be best to next get a sleep deprived EEG  I am placing an order for this test now

## 2021-03-17 NOTE — TELEPHONE ENCOUNTER
Spoke to patient  Informed of previous  Verbalized understanding  Will trial B6 to see if any relief of symptoms and update office

## 2021-03-17 NOTE — TELEPHONE ENCOUNTER
They could add Vitamin B6 100 mg daily  This can help with irritability from Levetiracetam  If the irritablity persists and seizures continue, we may need to consider a different medication  If irritability resolves, we could increase Levetiracetam is seizures continue

## 2021-03-18 ENCOUNTER — HOSPITAL ENCOUNTER (OUTPATIENT)
Dept: NEUROLOGY | Facility: CLINIC | Age: 36
Discharge: HOME | End: 2021-03-18
Payer: COMMERCIAL

## 2021-03-18 ENCOUNTER — APPOINTMENT (OUTPATIENT)
Dept: PHYSICAL THERAPY | Facility: CLINIC | Age: 36
End: 2021-03-18
Payer: COMMERCIAL

## 2021-03-18 DIAGNOSIS — G40.109 FOCAL EPILEPSY (HCC): ICD-10-CM

## 2021-03-18 PROCEDURE — 95819 EEG AWAKE AND ASLEEP: CPT | Performed by: PSYCHIATRY & NEUROLOGY

## 2021-03-18 PROCEDURE — 95819 EEG AWAKE AND ASLEEP: CPT

## 2021-03-19 ENCOUNTER — APPOINTMENT (OUTPATIENT)
Dept: PHYSICAL THERAPY | Facility: CLINIC | Age: 36
End: 2021-03-19
Payer: COMMERCIAL

## 2021-03-22 ENCOUNTER — OFFICE VISIT (OUTPATIENT)
Dept: PHYSICAL THERAPY | Facility: CLINIC | Age: 36
End: 2021-03-22
Payer: COMMERCIAL

## 2021-03-22 DIAGNOSIS — S23.9XXD THORACIC BACK SPRAIN, SUBSEQUENT ENCOUNTER: ICD-10-CM

## 2021-03-22 DIAGNOSIS — S22.009D: Primary | ICD-10-CM

## 2021-03-22 PROCEDURE — 97140 MANUAL THERAPY 1/> REGIONS: CPT

## 2021-03-22 PROCEDURE — 97164 PT RE-EVAL EST PLAN CARE: CPT | Performed by: PHYSICAL THERAPIST

## 2021-03-22 PROCEDURE — 97112 NEUROMUSCULAR REEDUCATION: CPT

## 2021-03-22 NOTE — LETTER
2021  PT Reevaluation [address] [facility], [address] [facility] 100  [address] [facility]    Patient: Fany Jorgensen   YOB: 1985   Date of Visit: 3/22/2021     Encounter Diagnosis     ICD-10-CM    1  Closed fracture of dorsal vertebra without spinal cord injury, with routine healing, subsequent encounter  S22 009D    2  Thoracic back sprain, subsequent encounter  S23  9XXD        Dear Dr Anca Robison: Thank you for your recent referral of Fany Jorgensen  Please review the attached evaluation summary from Pj's recent visit  Please verify that you agree with the plan of care by signing the attached order  If you have any questions or concerns, please do not hesitate to call  I sincerely appreciate the opportunity to share in the care of one of your patients and hope to have another opportunity to work with you in the near future  Sincerely,    Cesario Carter, PT      Referring Provider:      I certify that I have read the below Plan of Care and certify the need for these services furnished under this plan of treatment while under my care  Sydnee Smith MD  [facility] 100  [address] [place] [address],[place] [address]  [address]          Daily Note     Today's date: 3/22/2021  Patient name: Fany Jorgensen  : 1985  MRN: [id number]  Referring provider: Rachel Fonseca MD  Dx:   Encounter Diagnosis     ICD-10-CM    1  Closed fracture of dorsal vertebra without spinal cord injury, with routine healing, subsequent encounter  S22 009D    2  Thoracic back sprain, subsequent encounter  S23  9XXD        Start Time: 1600  Stop Time: 6162  Total time in clinic (min): 65 minutes    Subjective: Patient reported soreness at this time  Objective: PTA directed patient in TE program, with warm up on UBE and LE bike, See treatment diary below  Assessment: Tolerated treatment well   Patient exhibited good technique with therapeutic exercises, discomfort with PNF R UE  Plan: Continue per plan of care  Precautions:  Thoracic Compression Fractures and Sprain / Epilepsy     All treatments below will be provided with a focus on strengthening, flexibility, ROM, postural,   endurance and any possible swelling and pain which may be present without ignoring   neural issues involving balance, coordination and proprioception which is also important   and necessary to provide full functional mobility and quality care        Daily Treatment Log  Manual  3/15 3/22    3/12   MT, ROM 30' 30'    20'   HEP         Exercise Log 3/15     3/12   Balance Board          Chair Squats          BOSU-Walk          Foam Pad SLR,Hip/KneeFl,Step Ups          Foam Beam          P-Bar-OAL  10x 10x    10x   Monster Steps          Fitter-Slalom          T/G-Squats,PF          W/P-PNF,IR,ER,PU,PS:Top,Mid,Bot  10# 30x 10# 30x    10# All 10x   NuStep  10' L1 10'     10' L1   Raymon-BP,Lats,PD,Row          Pcyzmxv-IL-Nx  2x2' 2x 2'    2x2'   NK Table          Wall Slides ITVY  2x10 2/10    2x10   UBC  10' 10'    10'   Bike  10' 10'    10'   Stepper          ME, PE 15' 15'     15'   Sidelying Thoracic Extension          Modalities 3/15     3/12   [address] / PE / ES          US                  Attestation signed by Romy Benitez PT at 3/22/2021  5:08 PM:  I supervised the visit  We discussed the case to ensure appropriate continuation and progression of care and I reviewed the documentation  PT Re-Evaluation   Today's date: 3/22/2021    Patient name: Karolina Spence  : 1985  MRN: [id number]  Referring provider: Danielle Amin MD  Dx:   Encounter Diagnosis     ICD-10-CM    1  Closed fracture of dorsal vertebra without spinal cord injury, with routine healing, subsequent encounter  S22 009D    2  Thoracic back sprain, subsequent encounter  S23  9XXD      Assessment  Assessment details: Patient is feeling better with his back and thoracic issues  He has less pain  He can move better  He is sleeping better  Impairments: abnormal or restricted ROM, abnormal movement, activity intolerance, impaired balance, impaired physical strength, pain with function, safety issue and weight-bearing intolerance  Understanding of Dx/Px/POC: excellent   Prognosis: good    Goals  STG  2-4 weeks:   Increase lumbar spine AROM by 2-4 degrees  Increase lower extremity strength by 2-4 lbs in all weak areas  Improve sitting posture and body mechanics  Increase standing/ADL tolerance minutes  Decrease pain by 25-50% with standing, walking, and stairs  Initiate HEP  LTG  6-8 weeks:   Demonstrate normal lumbar rotation  Decrease pain to 1-2/10 with activity  Increase lower extremity strength by 10-20 lbs in all weak areas  Improve spinal stability  Improve gait pattern and balance  Decrease limitations with standing, walking and ADL's  DC with HEP  Plan  Plan details: All planned modality interventions and planned therapy interventions are provided PRN    Patient would benefit from: PT eval and skilled physical therapy  Planned modality interventions: TENS, ultrasound and unattended electrical stimulation  Planned therapy interventions: joint mobilization, manual therapy, balance, balance/weight bearing training, neuromuscular re-education, patient education, postural training, self care, strengthening, stretching, therapeutic activities, therapeutic exercise, therapeutic training, transfer training, home exercise program, graded exercise, gait training, flexibility and coordination  Frequency: 3x week  Duration in weeks: 12  Treatment plan discussed with: patient        Subjective Evaluation    Pain  Quality: discomfort, knife-like, pulling, squeezing, sharp, tight and throbbing  Relieving factors: support, rest, relaxation and change in position  Aggravating factors: lifting, running, stair climbing, walking and standing  Progression: improved    Treatments  Previous treatment: physical therapy  Current treatment: physical therapy  Patient Goals  Patient goals for therapy: decreased pain, improved balance, return to work, return to [employer] activities, independence with ADLs/IADLs, increased strength and increased motion          Objective     Date of onset:  1/14/2021    Date of Surgery:  None    History of Present Episode: 2/25/2021  Pj states he had a grand mall seizure last year  Since than he has had two more seizures  He apparently injured his thoracic spine at that time  He reports compression fractures of his thoracic spine   He now has back pain and would like therapy to help this area  Past Medical History:    2/25/2021  Pj reports epilepsy issues  Previous Level of Functional Ability:  2/25/2021  Pj states his back did not bother him before he injured himself during his seizure  Inspection / Palpation:  Lumbar:  2/25/2021  Mesomorphic body type  No signs of infection  No signs of wounds  No signs of drainage  No signs of ecchymotic regions  No signs of erythremic regions  Moderate signs of muscle spasm  Moderate signs of muscle guarding  Moderate signs of tenderness reported to palpation  Mild signs of atrophy noted  No signs of swelling  No signs of a surgery site  Current conditions appears consistent with recent acute episode  Thoracic region was very tender to palpation with his T-3 through T 10 region  Chief Complaints:  2/25/2021  Pj reports mild to moderate difficulty with standing  Pj reports mild to moderate difficulty with walking  Pj reports mild to moderate difficulty with movement / use of his lumbar region  Pj reports mild to moderate difficulty with use of stairs  Pj reports mild to moderate difficulty with running  Pj reports moderate difficulty with jumping  Pj reports mild to moderate difficulty with use of inclines  Pj reports severe difficulty with sleeping    Pj reports mild to moderate difficulty with his strength and endurance  Pj reports mild to moderate limitations with his range of motion  Pj reports moderate difficulty lying on his lumbar region  Pj reports mild to moderate difficulty twisting / turning his lumbar region      LUMBAR PAIN     Resting Palpation Bending Extending Walking Lifting   2/25/2021 0-2 2-5 5-8 2-5 2-5 4-6   3/22/2021 0-1 1-4 4-7 1-4 1-4 3-5     LUMBAR PAIN     Pulling Pushing Sleeping Twisting Standing   2/25/2021 2-5 2-5 4-10 2-5 2-5   3/22/2021 1-4 1-4 3-8 1-4 1-4     LUMBAR AROM Flexion Extension Rotation Right   2/25/2021 52° 5° 32°   3/22/2021 59° 7° 36°     LUMBAR AROM Rotation Left Side Bending Right Side Bending Left   2/25/2021 33° 32° 32°   3/22/2021 36° 35° 36°     LUMBAR MMT / PAIN Flexion Extension Rotation Right   2/25/2021 0/10  18 lbs 0/10  15 lbs 0/10  17 lbs   3/22/2021 0/10  22 ;lbs 0/10  19 lbs 0/10  21 lbs     LUMBAR MMT / PAIN Rotation Left Side Bend Right Side Bend Left   2/25/2021 0/10  15 lbs 0/10  15 lbs 0/10  14 lbs   3/22/2021 0/10  19 lbs 0/10  19 lbs 0/10  18 lbs     LE MMT / PAIN Dorsiflexion Plantarflexion Knee flexion Knee extension   2/25/2021 Rt 0/10  25 lbs 0/10  32 lbs 0/10  31 lbs 0/10  32 lbs   2/25/2021 Lt 0/10  26 lbs 0/10  33 lbs 0/10  32 lbs 0/10  31 lbs     LE MMT / PAIN Hip Flexion Hip Abduction Hip Adduction   2/25/2021  Rt 0/10  31 lbs 0/10  32 lbs 0/10  31 lbs   2/25/2021  Lt 0/10  30 lbs 0/10  32 lbs 0/10  32 lbs     LUMBAR SCREEN Referred Pain Sacroiliac Joint test Squish Test Straight Leg  Raise   2/25/2021 Rt Negative Negative Negative Negative   2/25/2021 Lt Negative Negative Negative Negative     LUMBAR SCREEN Valsalva Gapping Bowstring sign Hip Bursitis   2/25/2021 Rt Negative Negative Negative Negative   2/25/2021 Lt Negative Negative Negative Negative     Precautions:  Thoracic Compression Fractures and Sprain / Epilepsy      PT Discharge  Today's date: 4/8/2021  Patient name: Hermila Willisrola Sarbjit  : 1985  MRN: [id number]  Referring provider: Estrellita Evans MD  Dx:   Encounter Diagnosis     ICD-10-CM    1  Closed fracture of dorsal vertebra without spinal cord injury, with routine healing, subsequent encounter  S22 009D PT plan of care cert/re-cert   2  Thoracic back sprain, subsequent encounter  S23  9XXD PT plan of care cert/re-cert     Assessment/Plan    Subjective    Objective     2021  Pj Toth has not returned for more treatment  Angélicadionne Carpuneet did not attend today's appointment  I cannot provide you with a current progress report but I can provide you with information based on previous performance  Jimi Hurtado is discharged at this time

## 2021-03-24 NOTE — TELEPHONE ENCOUNTER
Katie from [address] spoke to patient about brace  Patient's insurance does not have coverage for DME and Duane Teran went over his options  Patient decided to try and look for a brace on his own

## 2021-03-25 ENCOUNTER — APPOINTMENT (OUTPATIENT)
Dept: PHYSICAL THERAPY | Facility: CLINIC | Age: 36
End: 2021-03-25
Payer: COMMERCIAL

## 2021-04-01 ENCOUNTER — TELEPHONE (OUTPATIENT)
Dept: NEUROLOGY | Facility: CLINIC | Age: 36
End: 2021-04-01

## 2021-04-01 NOTE — PROGRESS NOTES
PT Re-Evaluation   Today's date: 3/22/2021    Patient name: Selma Chang  : 1985  MRN: [id number]  Referring provider: Aruna Crouch MD  Dx:   Encounter Diagnosis     ICD-10-CM    1  Closed fracture of dorsal vertebra without spinal cord injury, with routine healing, subsequent encounter  S22 009D    2  Thoracic back sprain, subsequent encounter  S23  9XXD      Assessment  Assessment details: Patient is feeling better with his back and thoracic issues  He has less pain  He can move better  He is sleeping better  Impairments: abnormal or restricted ROM, abnormal movement, activity intolerance, impaired balance, impaired physical strength, pain with function, safety issue and weight-bearing intolerance  Understanding of Dx/Px/POC: excellent   Prognosis: good    Goals  STG  2-4 weeks:   Increase lumbar spine AROM by 2-4 degrees  Increase lower extremity strength by 2-4 lbs in all weak areas  Improve sitting posture and body mechanics  Increase standing/ADL tolerance minutes  Decrease pain by 25-50% with standing, walking, and stairs  Initiate HEP  LTG  6-8 weeks:   Demonstrate normal lumbar rotation  Decrease pain to 1-2/10 with activity  Increase lower extremity strength by 10-20 lbs in all weak areas  Improve spinal stability  Improve gait pattern and balance  Decrease limitations with standing, walking and ADL's  DC with HEP  Plan  Plan details: All planned modality interventions and planned therapy interventions are provided PRN    Patient would benefit from: PT eval and skilled physical therapy  Planned modality interventions: TENS, ultrasound and unattended electrical stimulation  Planned therapy interventions: joint mobilization, manual therapy, balance, balance/weight bearing training, neuromuscular re-education, patient education, postural training, self care, strengthening, stretching, therapeutic activities, therapeutic exercise, therapeutic training, transfer training, home exercise program, graded exercise, gait training, flexibility and coordination  Frequency: 3x week  Duration in weeks: 12  Treatment plan discussed with: patient        Subjective Evaluation    Pain  Quality: discomfort, knife-like, pulling, squeezing, sharp, tight and throbbing  Relieving factors: support, rest, relaxation and change in position  Aggravating factors: lifting, running, stair climbing, walking and standing  Progression: improved    Treatments  Previous treatment: physical therapy  Current treatment: physical therapy  Patient Goals  Patient goals for therapy: decreased pain, improved balance, return to work, return to [employer] activities, independence with ADLs/IADLs, increased strength and increased motion          Objective     Date of onset:  1/14/2021    Date of Surgery:  None    History of Present Episode: 2/25/2021  Pj states he had a [place] seizure last year  Since than he has had two more seizures  He apparently injured his thoracic spine at that time  He reports compression fractures of his thoracic spine   He now has back pain and would like therapy to help this area  Past Medical History:    2/25/2021  Pj reports epilepsy issues  Previous Level of Functional Ability:  2/25/2021  Pj states his back did not bother him before he injured himself during his seizure  Inspection / Palpation:  Lumbar:  2/25/2021  Mesomorphic body type  No signs of infection  No signs of wounds  No signs of drainage  No signs of ecchymotic regions  No signs of erythremic regions  Moderate signs of muscle spasm  Moderate signs of muscle guarding  Moderate signs of tenderness reported to palpation  Mild signs of atrophy noted  No signs of swelling  No signs of a surgery site  Current conditions appears consistent with recent acute episode  Thoracic region was very tender to palpation with his T-3 through T 10 region      Chief Complaints:  2/25/2021  Pj reports mild to moderate difficulty with standing  Pj reports mild to moderate difficulty with walking  Pj reports mild to moderate difficulty with movement / use of his lumbar region  Pj reports mild to moderate difficulty with use of stairs  Pj reports mild to moderate difficulty with running  Pj reports moderate difficulty with jumping  Pj reports mild to moderate difficulty with use of inclines  Pj reports severe difficulty with sleeping  Pj reports mild to moderate difficulty with his strength and endurance  Pj reports mild to moderate limitations with his range of motion  Pj reports moderate difficulty lying on his lumbar region  Pj reports mild to moderate difficulty twisting / turning his lumbar region      LUMBAR PAIN     Resting Palpation Bending Extending Walking Lifting   2/25/2021 0-2 2-5 5-8 2-5 2-5 4-6   3/22/2021 0-1 1-4 4-7 1-4 1-4 3-5     LUMBAR PAIN     Pulling Pushing Sleeping Twisting Standing   2/25/2021 2-5 2-5 4-10 2-5 2-5   3/22/2021 1-4 1-4 3-8 1-4 1-4     LUMBAR AROM Flexion Extension Rotation Right   2/25/2021 52° 5° 32°   3/22/2021 59° 7° 36°     LUMBAR AROM Rotation Left Side Bending Right Side Bending Left   2/25/2021 33° 32° 32°   3/22/2021 36° 35° 36°     LUMBAR MMT / PAIN Flexion Extension Rotation Right   2/25/2021 0/10  18 lbs 0/10  15 lbs 0/10  17 lbs   3/22/2021 0/10  22 ;lbs 0/10  19 lbs 0/10  21 lbs     LUMBAR MMT / PAIN Rotation Left Side Bend Right Side Bend Left   2/25/2021 0/10  15 lbs 0/10  15 lbs 0/10  14 lbs   3/22/2021 0/10  19 lbs 0/10  19 lbs 0/10  18 lbs     LE MMT / PAIN Dorsiflexion Plantarflexion Knee flexion Knee extension   2/25/2021 Rt 0/10  25 lbs 0/10  32 lbs 0/10  31 lbs 0/10  32 lbs   2/25/2021 Lt 0/10  26 lbs 0/10  33 lbs 0/10  32 lbs 0/10  31 lbs     LE MMT / PAIN Hip Flexion Hip Abduction Hip Adduction   2/25/2021  Rt 0/10  31 lbs 0/10  32 lbs 0/10  31 lbs   2/25/2021  Lt 0/10  30 lbs 0/10  32 lbs 0/10  32 lbs LUMBAR SCREEN Referred Pain Sacroiliac Joint test Squish Test Straight Leg  Raise   2/25/2021 Rt Negative Negative Negative Negative   2/25/2021 Lt Negative Negative Negative Negative     LUMBAR SCREEN Valsalva Gapping Bowstring sign Hip Bursitis   2/25/2021 Rt Negative Negative Negative Negative   2/25/2021 Lt Negative Negative Negative Negative     Precautions:  Thoracic Compression Fractures and Sprain / Epilepsy

## 2021-04-08 NOTE — PROGRESS NOTES
PT Discharge  Today's date: 2021  Patient name: Fany Jorgensen  : 1985  MRN: [id number]  Referring provider: Rachel Fonseca MD  Dx:   Encounter Diagnosis     ICD-10-CM    1  Closed fracture of dorsal vertebra without spinal cord injury, with routine healing, subsequent encounter  S22 009D PT plan of care cert/re-cert   2  Thoracic back sprain, subsequent encounter  S23  9XXD PT plan of care cert/re-cert     Assessment/Plan    Subjective    Objective     2021  Pj Estrada has not returned for more treatment  Fany Jorgensen did not attend today's appointment  I cannot provide you with a current progress report but I can provide you with information based on previous performance  Fany Jorgensen is discharged at this time

## 2021-04-16 ENCOUNTER — HOSPITAL ENCOUNTER (EMERGENCY)
Facility: HOSPITAL | Age: 36
Discharge: HOME | End: 2021-04-16
Attending: EMERGENCY MEDICINE
Payer: COMMERCIAL

## 2021-04-16 ENCOUNTER — APPOINTMENT (EMERGENCY)
Dept: CT IMAGING | Facility: HOSPITAL | Age: 36
End: 2021-04-16
Payer: COMMERCIAL

## 2021-04-16 ENCOUNTER — APPOINTMENT (EMERGENCY)
Dept: RADIOLOGY | Facility: HOSPITAL | Age: 36
End: 2021-04-16
Payer: COMMERCIAL

## 2021-04-16 VITALS
BODY MASS INDEX: 25.3 KG/M2 | WEIGHT: 171.3 LBS | TEMPERATURE: 97.4 F | OXYGEN SATURATION: 98 % | SYSTOLIC BLOOD PRESSURE: 106 MMHG | RESPIRATION RATE: 17 BRPM | DIASTOLIC BLOOD PRESSURE: 76 MMHG | HEART RATE: 55 BPM

## 2021-04-16 DIAGNOSIS — S09.90XA INJURY OF HEAD, INITIAL ENCOUNTER: Primary | ICD-10-CM

## 2021-04-16 DIAGNOSIS — R55 SYNCOPE: ICD-10-CM

## 2021-04-16 DIAGNOSIS — T14.8XXA CONTUSION: ICD-10-CM

## 2021-04-16 LAB
ALBUMIN SERPL BCP-MCNC: 3.9 G/DL (ref 3.5–5)
ALP SERPL-CCNC: 96 U/L (ref 46–116)
ALT SERPL W P-5'-P-CCNC: 28 U/L (ref 12–78)
AMPHETAMINES SERPL QL SCN: NEGATIVE
ANION GAP SERPL CALCULATED.3IONS-SCNC: 8 MMOL/L (ref 4–13)
APTT PPP: 27 SECONDS (ref 23–37)
AST SERPL W P-5'-P-CCNC: 18 U/L (ref 5–45)
BACTERIA UR QL AUTO: ABNORMAL /HPF
BARBITURATES UR QL: NEGATIVE
BASOPHILS # BLD AUTO: 0.09 THOUSANDS/ΜL (ref 0–0.1)
BASOPHILS NFR BLD AUTO: 1 % (ref 0–1)
BENZODIAZ UR QL: NEGATIVE
BILIRUB SERPL-MCNC: 0.52 MG/DL (ref 0.2–1)
BILIRUB UR QL STRIP: ABNORMAL
BUN SERPL-MCNC: 15 MG/DL (ref 5–25)
CALCIUM SERPL-MCNC: 8.8 MG/DL (ref 8.3–10.1)
CHLORIDE SERPL-SCNC: 102 MMOL/L (ref 100–108)
CLARITY UR: ABNORMAL
CO2 SERPL-SCNC: 28 MMOL/L (ref 21–32)
COCAINE UR QL: NEGATIVE
COLOR UR: YELLOW
CREAT SERPL-MCNC: 1.02 MG/DL (ref 0.6–1.3)
EOSINOPHIL # BLD AUTO: 0.17 THOUSAND/ΜL (ref 0–0.61)
EOSINOPHIL NFR BLD AUTO: 2 % (ref 0–6)
ERYTHROCYTE [DISTWIDTH] IN BLOOD BY AUTOMATED COUNT: 12.6 % (ref 11.6–15.1)
GFR SERPL CREATININE-BSD FRML MDRD: 94 ML/MIN/1.73SQ M
GLUCOSE SERPL-MCNC: 133 MG/DL (ref 65–140)
GLUCOSE UR STRIP-MCNC: NEGATIVE MG/DL
HCT VFR BLD AUTO: 39.8 % (ref 36.5–49.3)
HGB BLD-MCNC: 13.8 G/DL (ref 12–17)
HGB UR QL STRIP.AUTO: ABNORMAL
IMM GRANULOCYTES # BLD AUTO: 0.03 THOUSAND/UL (ref 0–0.2)
IMM GRANULOCYTES NFR BLD AUTO: 0 % (ref 0–2)
INR PPP: 1.08 (ref 0.84–1.19)
KETONES UR STRIP-MCNC: ABNORMAL MG/DL
LEUKOCYTE ESTERASE UR QL STRIP: NEGATIVE
LYMPHOCYTES # BLD AUTO: 1.02 THOUSANDS/ΜL (ref 0.6–4.47)
LYMPHOCYTES NFR BLD AUTO: 12 % (ref 14–44)
MAGNESIUM SERPL-MCNC: 1.9 MG/DL (ref 1.6–2.6)
MCH RBC QN AUTO: 30.6 PG (ref 26.8–34.3)
MCHC RBC AUTO-ENTMCNC: 34.7 G/DL (ref 31.4–37.4)
MCV RBC AUTO: 88 FL (ref 82–98)
METHADONE UR QL: NEGATIVE
MONOCYTES # BLD AUTO: 0.55 THOUSAND/ΜL (ref 0.17–1.22)
MONOCYTES NFR BLD AUTO: 6 % (ref 4–12)
MUCOUS THREADS UR QL AUTO: ABNORMAL
NEUTROPHILS # BLD AUTO: 6.72 THOUSANDS/ΜL (ref 1.85–7.62)
NEUTS SEG NFR BLD AUTO: 79 % (ref 43–75)
NITRITE UR QL STRIP: NEGATIVE
NON-SQ EPI CELLS URNS QL MICRO: ABNORMAL /HPF
NRBC BLD AUTO-RTO: 0 /100 WBCS
OPIATES UR QL SCN: NEGATIVE
OXYCODONE+OXYMORPHONE UR QL SCN: NEGATIVE
PCP UR QL: NEGATIVE
PH UR STRIP.AUTO: 6 [PH]
PLATELET # BLD AUTO: 248 THOUSANDS/UL (ref 149–390)
PMV BLD AUTO: 9.5 FL (ref 8.9–12.7)
POTASSIUM SERPL-SCNC: 3.6 MMOL/L (ref 3.5–5.3)
PROT SERPL-MCNC: 6.9 G/DL (ref 6.4–8.2)
PROT UR STRIP-MCNC: ABNORMAL MG/DL
PROTHROMBIN TIME: 13.8 SECONDS (ref 11.6–14.5)
RBC # BLD AUTO: 4.51 MILLION/UL (ref 3.88–5.62)
RBC #/AREA URNS AUTO: ABNORMAL /HPF
SODIUM SERPL-SCNC: 138 MMOL/L (ref 136–145)
SP GR UR STRIP.AUTO: >=1.03 (ref 1–1.03)
THC UR QL: POSITIVE
TROPONIN I SERPL-MCNC: <0.02 NG/ML
UROBILINOGEN UR QL STRIP.AUTO: 1 E.U./DL
WBC # BLD AUTO: 8.58 THOUSAND/UL (ref 4.31–10.16)
WBC #/AREA URNS AUTO: ABNORMAL /HPF

## 2021-04-16 PROCEDURE — 85610 PROTHROMBIN TIME: CPT | Performed by: PHYSICIAN ASSISTANT

## 2021-04-16 PROCEDURE — 99284 EMERGENCY DEPT VISIT MOD MDM: CPT

## 2021-04-16 PROCEDURE — 36415 COLL VENOUS BLD VENIPUNCTURE: CPT | Performed by: PHYSICIAN ASSISTANT

## 2021-04-16 PROCEDURE — 84484 ASSAY OF TROPONIN QUANT: CPT | Performed by: PHYSICIAN ASSISTANT

## 2021-04-16 PROCEDURE — 81001 URINALYSIS AUTO W/SCOPE: CPT | Performed by: PHYSICIAN ASSISTANT

## 2021-04-16 PROCEDURE — 85025 COMPLETE CBC W/AUTO DIFF WBC: CPT | Performed by: PHYSICIAN ASSISTANT

## 2021-04-16 PROCEDURE — 72125 CT NECK SPINE W/O DYE: CPT

## 2021-04-16 PROCEDURE — 80177 DRUG SCRN QUAN LEVETIRACETAM: CPT | Performed by: PHYSICIAN ASSISTANT

## 2021-04-16 PROCEDURE — 80053 COMPREHEN METABOLIC PANEL: CPT | Performed by: PHYSICIAN ASSISTANT

## 2021-04-16 PROCEDURE — 70486 CT MAXILLOFACIAL W/O DYE: CPT

## 2021-04-16 PROCEDURE — 80307 DRUG TEST PRSMV CHEM ANLYZR: CPT | Performed by: PHYSICIAN ASSISTANT

## 2021-04-16 PROCEDURE — 71045 X-RAY EXAM CHEST 1 VIEW: CPT

## 2021-04-16 PROCEDURE — 83735 ASSAY OF MAGNESIUM: CPT | Performed by: PHYSICIAN ASSISTANT

## 2021-04-16 PROCEDURE — 70450 CT HEAD/BRAIN W/O DYE: CPT

## 2021-04-16 PROCEDURE — 99285 EMERGENCY DEPT VISIT HI MDM: CPT | Performed by: PHYSICIAN ASSISTANT

## 2021-04-16 PROCEDURE — 93005 ELECTROCARDIOGRAM TRACING: CPT

## 2021-04-16 PROCEDURE — 72170 X-RAY EXAM OF PELVIS: CPT

## 2021-04-16 PROCEDURE — 85730 THROMBOPLASTIN TIME PARTIAL: CPT | Performed by: PHYSICIAN ASSISTANT

## 2021-04-16 RX ORDER — KETOROLAC TROMETHAMINE 30 MG/ML
15 INJECTION, SOLUTION INTRAMUSCULAR; INTRAVENOUS ONCE
Status: DISCONTINUED | OUTPATIENT
Start: 2021-04-16 | End: 2021-04-16

## 2021-04-16 NOTE — ED PROVIDER NOTES
Emergency Department Trauma Note  Sherman Pedro 38 y o  male MRN: [id number]  Unit/Bed#: ED 08/ED 08 Encounter: [id number]      Trauma Alert: Trauma Acuity: Trauma Evaluation  Model of Arrival: Mode of Arrival: (private car) via    Trauma Team: Current Providers  Attending Provider: Shakira Urban DO  Registered Nurse: Grupo Guevara, RN  Physician Assistant: Arik Rosas PA-C  Consultants: None      History of Present Illness     Chief Complaint:   Chief Complaint   Patient presents with    Fall     fell from standing @ 12:15pm today  Pt wife found pt lying on floor of bathroom with forehead against wall  Sustained 3cm linear abrasion to forehead  Unknown LOC  c collar applied in triage  HPI:  Sherman Pedro is a 38 y o  male who presents with fall  Mechanism:Details of Incident: fell from standing with head strike Injury Date: 04/16/21 Injury Time: 1315 Injury Occurence Location - [address] [place] [address] Way: [facility]    The patient is a 58-year-old male, past medical history of seizure disorder on Keppra,who presents to the ED escorted by his spouse for the c/o fall  The patient states that he did not feel well while walking to the bathroom, he felt as though he was going to pass out  The wife states that he yelled out, and then she found him on the floor  He struck the front of his forehead off the bathroom wall  The patient had 1 episode of emesis after the head injury  The patient does not recall details from the fall  Patient states that this happened around 12 15 today  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  Patient takes [address]  Wife states that he has been having episodes of "staring" or being "out of it" for a few minutes at a time, and this is happening more frequently over the last few months  He admits to daily THC use     He is currently complaining jaw and head pain      Fall  Mechanism of injury: fall    Injury location:  Head/neck  Head/neck injury location: Head  Incident location:  Bathroom  Time since incident:  2 hours  Arrived directly from scene: yes    Fall:     Fall occurred:  Standing and walking    Impact surface:  Wall    Point of impact:  Head    Entrapped after fall: no    Suspicion of alcohol use: no    Tetanus status:  Unknown  Prior to arrival data:     Patient ambulatory at scene: yes      Blood loss:  None    Loss of consciousness: yes      Amnesic to event: yes      Breathing interventions:  None    IV access status:  None    IO access:  None    Fluids administered:  None    Cardiac interventions:  None    Medications administered:  None    Immobilization:  None    Airway condition since incident:  Stable    Breathing condition since incident:  Stable    Circulation condition since incident:  Stable    Mental status condition since incident:  Stable    Disability condition since incident:  Stable  Associated symptoms: loss of consciousness and vomiting    Associated symptoms: no abdominal pain, no back pain, no blindness, no chest pain and no seizures    Vomiting:     Number of occurrences:  1    Duration:  2 hours    Timing:  Constant    Progression:  Unchanged  Risk factors: no anticoagulation therapy      Review of Systems   Constitutional: Negative for chills and fever  HENT: Negative for ear pain and sore throat  Eyes: Negative for blindness, pain and visual disturbance  Respiratory: Negative for cough and shortness of breath  Cardiovascular: Negative for chest pain and palpitations  Gastrointestinal: Positive for vomiting  Negative for abdominal pain  Genitourinary: Negative for dysuria and hematuria  Musculoskeletal: Negative for arthralgias and back pain  Skin: Negative for color change and rash  Neurological: Positive for loss of consciousness  Negative for seizures and syncope  All other systems reviewed and are negative  Historical Information     Immunizations:    There is no immunization history on file for this patient  Past Medical History:   Diagnosis Date    Anxiety     Brain bleed ([facility] [address] ) 2012    GERD (gastroesophageal reflux disease)     Migraine     Seizures ([facility] [address] )        Family History   Problem Relation Age of Onset    Thyroid disease Mother     Heart disease Father     Hypertension Father     No Known Problems Brother     Cancer Maternal Grandmother     No Known Problems Maternal Grandfather     Parkinsonism Paternal Grandmother     Heart disease Paternal Grandfather     Other Son         blounts disease    No Known Problems Daughter     Seizures Neg Hx      Past Surgical History:   Procedure Laterality Date    EPIDURAL BLOCK INJECTION N/A 2/11/2021    Procedure: T9 T10 INTERLAMINAR THORACIC EPIDURAL STEROID INJECTION;  Surgeon: Estelle Alpers, MD;  [facility]: [facility];  Service: Pain Management     FINGER SURGERY Right     5th digit    NERVE BLOCK Bilateral 1/21/2021    Procedure: T7 T8 T9 T10 MEDIAL BRANCH BLOCK #1;  Surgeon: Estelle Alpers, MD;  Location: [facility];  Service: Pain Management     WISDOM TOOTH EXTRACTION       Social History     Tobacco Use    Smoking status: Light Tobacco Smoker    Smokeless tobacco: Never Used   Substance Use Topics    Alcohol use: Yes     Frequency: Monthly or less     Drinks per session: 1 or 2    Drug use: Yes     Types: Marijuana     E-Cigarette/Vaping    E-Cigarette Use Never User      E-Cigarette/Vaping Substances       Family History: non-contributory    Meds/Allergies   Prior to Admission Medications   Prescriptions Last Dose Informant Patient Reported? Taking?    Magnesium 500 MG CAPS 4/16/2021 at Unknown time  Yes Yes   Sig: Take by mouth   Multiple Vitamin (multivitamin) capsule 4/16/2021 at Unknown time  Yes Yes   Sig: Take 1 capsule by mouth daily   ascorbic acid (VITAMIN C) 250 mg tablet 4/16/2021 at Unknown time  Yes Yes   Sig: Take 250 mg by mouth daily   b complex vitamins tablet 4/16/2021 at Unknown time  Yes Yes   Sig: Take 1 tablet by mouth daily   levETIRAcetam (KEPPRA) 500 mg tablet 4/16/2021 at Unknown time  No Yes   Sig: TAKE 2 TABLETS (1,000 MG TOTAL) BY MOUTH EVERY 12 (TWELVE) HOURS      Facility-Administered Medications: None       Allergies   Allergen Reactions    Sumatriptan Rash     Burning sensation   skin gets warm  skin gets warm         PHYSICAL EXAM    PE limited by: N/A    Objective   Vitals:   First set: Temperature: (!) 97 4 °F (36 3 °C) (04/16/21 1352)  Pulse: 84 (04/16/21 1351)  Respirations: 18 (04/16/21 1351)  Blood Pressure: 118/77 (04/16/21 1351)  SpO2: 97 % (04/16/21 1355)    Primary Survey:   (A) Airway: normal  (B) Breathing: normal3  (C) Circulation: Pulses:   normal  (D) Disabliity:  GCS Total:  15  (E) Expose:  Completed    Secondary Survey: (Click on Physical Exam tab above)  Physical Exam  Vitals signs and nursing note reviewed  Constitutional:       Appearance: He is well-developed  HENT:      Head: Normocephalic and atraumatic  Nose: Nose normal       Mouth/Throat:      Mouth: Mucous membranes are moist       Pharynx: Oropharynx is clear  Uvula midline  Eyes:      Extraocular Movements: Extraocular movements intact  Conjunctiva/sclera: Conjunctivae normal    Neck:      Musculoskeletal: Normal range of motion and neck supple  Cardiovascular:      Rate and Rhythm: Normal rate and regular rhythm  Heart sounds: No murmur  Pulmonary:      Effort: Pulmonary effort is normal  No respiratory distress  Breath sounds: Normal breath sounds  Chest:      Chest wall: No mass or deformity  Abdominal:      Palpations: Abdomen is soft  Tenderness: There is no abdominal tenderness  Musculoskeletal:      Right lower leg: No edema  Left lower leg: No edema  Skin:     General: Skin is warm and dry  Capillary Refill: Capillary refill takes less than 2 seconds  Findings: No abrasion or ecchymosis  Neurological:      General: No focal deficit present        Mental Status: He is alert and oriented to person, place, and time  Cranial Nerves: Cranial nerves are intact  Sensory: Sensation is intact  Motor: Motor function is intact  Coordination: Coordination is intact  Gait: Gait is intact  Cervical spine cleared by clinical criteria? No (imaging required)      Invasive Devices     None                 Lab Results:   Results Reviewed     Procedure Component Value Units Date/Time    Rapid drug screen, urine [[id number]]  (Abnormal) Collected: 04/16/21 1451    Lab Status: Final result Specimen: Urine, Clean Catch Updated: 04/16/21 1518     Amph/Meth UR Negative     Barbiturate Ur Negative     Benzodiazepine Urine Negative     Cocaine Urine Negative     Methadone Urine Negative     Opiate Urine Negative     PCP Ur Negative     THC Urine Positive     Oxycodone Urine Negative    Narrative:      Presumptive report  If requested, specimen will be sent to reference lab for confirmation  FOR MEDICAL PURPOSES ONLY  IF CONFIRMATION NEEDED PLEASE CONTACT THE LAB WITHIN 5 DAYS      Drug Screen Cutoff Levels:  AMPHETAMINE/METHAMPHETAMINES  1000 ng/mL  BARBITURATES     200 ng/mL  BENZODIAZEPINES     200 ng/mL  COCAINE      300 ng/mL  METHADONE      300 ng/mL  OPIATES      300 ng/mL  PHENCYCLIDINE     25 ng/mL  THC       50 ng/mL  OXYCODONE      100 ng/mL    Urine Microscopic [[id number]]  (Abnormal) Collected: 04/16/21 1451    Lab Status: Final result Specimen: Urine, Clean Catch Updated: 04/16/21 1514     RBC, UA 20-30 /hpf      WBC, UA 1-2 /hpf      Epithelial Cells Occasional /hpf      Bacteria, UA Occasional /hpf      MUCUS THREADS Occasional    UA w Reflex to Microscopic w Reflex to Culture [[id number]]  (Abnormal) Collected: 04/16/21 1451    Lab Status: Final result Specimen: Urine, Clean Catch Updated: 04/16/21 1505     Color, UA Yellow     Clarity, UA Slightly Cloudy     Specific Gravity, UA >=1 030     pH, UA 6 0     Leukocytes, UA Negative     Nitrite, UA Negative Protein, UA Trace mg/dl      Glucose, UA Negative mg/dl      Ketones, UA 15 (1+) mg/dl      Urobilinogen, UA 1 0 E U /dl      Bilirubin, UA Small     Blood, UA Moderate    Troponin I [[id number]]  (Normal) Collected: 04/16/21 1421    Lab Status: Final result Specimen: Blood from Arm, Left Updated: 04/16/21 1456     Troponin I <0 02 ng/mL     APTT [[id number]]  (Normal) Collected: 04/16/21 1429    Lab Status: Final result Specimen: Blood from Arm, Left Updated: 04/16/21 1451     PTT 27 seconds     Protime-INR [[id number]]  (Normal) Collected: 04/16/21 1429    Lab Status: Final result Specimen: Blood from Arm, Left Updated: 04/16/21 1451     Protime 13 8 seconds      INR 1 08    Magnesium [[id number]]  (Normal) Collected: 04/16/21 1421    Lab Status: Final result Specimen: Blood from Arm, Left Updated: 04/16/21 1451     Magnesium 1 9 mg/dL     Comprehensive metabolic panel [[id number]] Collected: 04/16/21 1421    Lab Status: Final result Specimen: Blood from Arm, Left Updated: 04/16/21 1451     Sodium 138 mmol/L      Potassium 3 6 mmol/L      Chloride 102 mmol/L      CO2 28 mmol/L      ANION GAP 8 mmol/L      BUN 15 mg/dL      Creatinine 1 02 mg/dL      Glucose 133 mg/dL      Calcium 8 8 mg/dL      AST 18 U/L      ALT 28 U/L      Alkaline Phosphatase 96 U/L      Total Protein 6 9 g/dL      Albumin 3 9 g/dL      Total Bilirubin 0 52 mg/dL      eGFR 94 ml/min/1 73sq m     Narrative:      [facility] guidelines for Chronic Kidney Disease (CKD):     Stage 1 with normal or high GFR (GFR > 90 mL/min/1 73 square meters)    Stage 2 Mild CKD (GFR = 60-89 mL/min/1 73 square meters)    Stage 3A Moderate CKD (GFR = 45-59 mL/min/1 73 square meters)    Stage 3B Moderate CKD (GFR = 30-44 mL/min/1 73 square meters)    Stage 4 Severe CKD (GFR = 15-29 mL/min/1 73 square meters)    Stage 5 End Stage CKD (GFR <15 mL/min/1 73 square meters)  Note: GFR calculation is accurate only with a steady state creatinine CBC and differential [[id number]]  (Abnormal) Collected: 04/16/21 1421    Lab Status: Final result Specimen: Blood from Arm, Left Updated: 04/16/21 1437     WBC 8 58 Thousand/uL      RBC 4 51 Million/uL      Hemoglobin 13 8 g/dL      Hematocrit 39 8 %      MCV 88 fL      MCH 30 6 pg      MCHC 34 7 g/dL      RDW 12 6 %      MPV 9 5 fL      Platelets 501 Thousands/uL      nRBC 0 /100 WBCs      Neutrophils Relative 79 %      Immat GRANS % 0 %      Lymphocytes Relative 12 %      Monocytes Relative 6 %      Eosinophils Relative 2 %      Basophils Relative 1 %      Neutrophils Absolute 6 72 Thousands/µL      Immature Grans Absolute 0 03 Thousand/uL      Lymphocytes Absolute 1 02 Thousands/µL      Monocytes Absolute 0 55 Thousand/µL      Eosinophils Absolute 0 17 Thousand/µL      Basophils Absolute 0 09 Thousands/µL     Levetiracetam level [[id number]] Collected: 04/16/21 1429    Lab Status: In process Specimen: Blood from Arm, Left Updated: 04/16/21 1434                 Imaging Studies:   Direct to CT: Yes  XR Trauma chest portable   Final Result by Eyad Patel MD (04/16 1436)      No acute cardiopulmonary disease  Workstation performed: [id number]         XR Trauma pelvis ap only 1 or 2 vw   Final Result by Eyad Patel MD (04/16 1438)      No acute osseous abnormality  Workstation performed: [id number]         TRAUMA - CT head wo contrast   Final Result by Eyad Patel MD (04/16 1435)      No acute intracranial abnormality  Left frontal scalp swelling  The study was marked in [facility] for immediate notification  Workstation performed: [id number]         TRAUMA - CT spine cervical wo contrast   Final Result by Eyad Patel MD (04/16 1434)      No cervical spine fracture or traumatic malalignment  The study was marked in [facility] for immediate notification           Workstation performed: [id number]         TRAUMA - CT facial bones wo contrast   Final Result by Nicci Jorge MD (04/16 1433)      Left supraorbital soft tissue swelling  No evidence of fracture  The study was marked in [facility] for immediate notification  Workstation performed: [id number]               Procedures  ECG 12 Lead Documentation Only    Date/Time: 4/16/2021 2:12 PM  Performed by: Pasquale Nicole PA-C  Authorized by: Pasquale Nicole PA-C     Indications / Diagnosis:  Fall  ECG reviewed by me, the ED Provider: yes    Patient location:  ED  Previous ECG:     Previous ECG:  Compared to current    Comparison ECG info:  01/29/21    Similarity:  No change  Interpretation:     Interpretation: normal    Rate:     ECG rate:  71    ECG rate assessment: normal    Rhythm:     Rhythm: sinus rhythm    Conduction:     Conduction: normal    ST segments:     ST segments:  Normal  T waves:     T waves: normal               ED Course  ED Course as of Apr 16 1738 Fri Apr 16, 2021   1354 Trauma evaluation       1519 [facility] URINE(!): Positive   1522 Cervical Collar Clearance: The patient had a CT scan of the cervical spine demonstrating no acute injury  On exam, the patient had no midline point tenderness or paresthesias/numbness/weakness in the extremities  The patient had full range of motion (was then able to flex, extend, and rotate head laterally) without pain  There were no distracting injuries and the patient was not intoxicated  The patient's cervical spine was cleared radiologically and clinically  Cervical collar removed at this time  Pasquale Nicole PA-C  4/16/2021 3:22 PM                   MDM  Number of Diagnoses or Management Options  Contusion:   Injury of head, initial encounter:   Syncope:   Diagnosis management comments: Lab work unremarkable CT scans unremarkable  C-collar was removed  Patient was told to follow-up with his neurologist and he expressed understanding was in agreement treatment plan         Amount and/or Complexity of Data Reviewed  Clinical lab tests: ordered and reviewed  Tests in the radiology section of CPT®: ordered and reviewed  Decide to obtain previous medical records or to obtain history from someone other than the patient: yes  Review and summarize past medical records: yes  Independent visualization of images, tracings, or specimens: yes    Risk of Complications, Morbidity, and/or Mortality  Presenting problems: moderate  Diagnostic procedures: moderate  Management options: moderate    Patient Progress  Patient progress: stable          Disposition  [facility] Transfer: No  Final diagnoses:   Injury of head, initial encounter   Contusion   Syncope     Time reflects when diagnosis was documented in both MDM as applicable and the Disposition within this note     Time User Action Codes Description Comment    4/16/2021  3:24 PM Iggy Sol Add [S09 90XA] Injury of head, initial encounter     4/16/2021  3:24 PM 2030 [facility], [address]  8XXA] Contusion     4/16/2021  3:24 PM Iggy Sol Add [R55] Syncope       ED Disposition     ED Disposition Condition Date/Time Comment    Discharge Stable Fri Apr 16, 2021  3:24 PM Pj Leeds discharge to home/self care  Follow-up Information    None       Discharge Medication List as of 4/16/2021  3:25 PM      CONTINUE these medications which have NOT CHANGED    Details   ascorbic acid (VITAMIN C) 250 mg tablet Take 250 mg by mouth daily, Historical Med      b complex vitamins tablet Take 1 tablet by mouth daily, Historical Med      levETIRAcetam (KEPPRA) 500 mg tablet TAKE 2 TABLETS (1,000 MG TOTAL) BY MOUTH EVERY 12 (TWELVE) HOURS, Starting Wed 3/10/2021, Normal      Magnesium 500 MG CAPS Take by mouth, Historical Med      Multiple Vitamin (multivitamin) capsule Take 1 capsule by mouth daily, Historical Med           No discharge procedures on file      PDMP Review     None          ED Provider  Electronically Signed by         Payam White PA-C  04/16/21 8262

## 2021-04-16 NOTE — ED NOTES
IV catheter discontinued intact  Site without signs and symptoms of complications  Dressing and pressure applied  Patient verbalized understanding of discharge instructions including medication administration and follow-up care  No further questions or concerns at this time          Mohamud Saeed RN  04/16/21 6824

## 2021-04-18 LAB
ATRIAL RATE: 71 BPM
P AXIS: 42 DEGREES
PR INTERVAL: 150 MS
QRS AXIS: 60 DEGREES
QRSD INTERVAL: 88 MS
QT INTERVAL: 398 MS
QTC INTERVAL: 432 MS
T WAVE AXIS: 18 DEGREES
VENTRICULAR RATE: 71 BPM

## 2021-04-18 PROCEDURE — 93010 ELECTROCARDIOGRAM REPORT: CPT | Performed by: INTERNAL MEDICINE

## 2021-04-19 ENCOUNTER — TELEPHONE (OUTPATIENT)
Dept: NEUROLOGY | Facility: CLINIC | Age: 36
End: 2021-04-19

## 2021-04-19 DIAGNOSIS — G40.109 FOCAL EPILEPSY (HCC): Primary | ICD-10-CM

## 2021-04-19 LAB — LEVETIRACETAM SERPL-MCNC: 24.3 UG/ML (ref 10–40)

## 2021-04-19 NOTE — TELEPHONE ENCOUNTER
patient calling to report focal seizures  States syncope/seizure episodes are getting more frequent  Patient reports 3 syncopal episodes within the last 2 weeks  Did hit head with one of last spells and was evaluated in ED last week  Patient was able to have wife check blood sugar with one of last events and BS was noted to be 52  Patient reports B6 is helping with irritability associated with keppra, but events seem to be increasing in frequency  Medications confirmed:  levetiracetam 500mg tablets - 2 tablets twice per day  Seizure charactertistics: focal seizures last only about 1-2 minutes and then about 5 minutes or so to come back into it  During recovery he is shakey, confused, groggy  [phone] - okay to leave detailed message

## 2021-04-20 NOTE — TELEPHONE ENCOUNTER
I really did want to try to capture his events on EEG to try to capture the events  If he cannot do an EMU stay, if his events are happening very often (multiple times a day), we may be able to do a prolonged outpatient EEG to try to capture them  Alternatively, we can try to increase his Levetiracetam another step to 1500 mg twice a day

## 2021-04-20 NOTE — TELEPHONE ENCOUNTER
Spoke to patient and wife regarding EMU admission as well as other options  They will discuss and give our office a call back with decision

## 2021-04-26 NOTE — TELEPHONE ENCOUNTER
Spoke to patient  He is agreeable to prolonged EEG  Please enter order  Patient also questioning work  He is looking into disability and in the future would be asking for note to help case  he has access to records on [employer], but may request letter as well  Nothing needed at this time, patient will notify office when he gets more information

## 2021-04-27 NOTE — TELEPHONE ENCOUNTER
Detailed message left for patient informing him of previous  Provided the number for CS  Requested a call back with questions

## 2021-05-12 ENCOUNTER — TELEPHONE (OUTPATIENT)
Dept: NEUROLOGY | Facility: CLINIC | Age: 36
End: 2021-05-12

## 2021-05-12 NOTE — TELEPHONE ENCOUNTER
Called and spoke to patient  Patient confirmed upcoming apt with Edita Lugo on 5/24/21 @ 2:15 pm  Patient has no issues or concerns at this time

## 2021-05-20 ENCOUNTER — HOSPITAL ENCOUNTER (OUTPATIENT)
Dept: NEUROLOGY | Facility: CLINIC | Age: 36
Discharge: HOME | End: 2021-05-20
Payer: COMMERCIAL

## 2021-05-20 DIAGNOSIS — G40.109 FOCAL EPILEPSY (HCC): ICD-10-CM

## 2021-05-20 PROCEDURE — 95718 EEG PHYS/QHP 2-12 HR W/VEEG: CPT | Performed by: PSYCHIATRY & NEUROLOGY

## 2021-05-20 PROCEDURE — 95713 VEEG 2-12 HR CONT MNTR: CPT

## 2021-05-24 ENCOUNTER — TELEPHONE (OUTPATIENT)
Dept: NEUROLOGY | Facility: CLINIC | Age: 36
End: 2021-05-24

## 2021-05-24 ENCOUNTER — TELEMEDICINE (OUTPATIENT)
Dept: NEUROLOGY | Facility: CLINIC | Age: 36
End: 2021-05-24
Payer: COMMERCIAL

## 2021-05-24 VITALS — HEIGHT: 69 IN | BODY MASS INDEX: 23.7 KG/M2 | WEIGHT: 160 LBS

## 2021-05-24 DIAGNOSIS — M54.6 THORACIC BACK PAIN, UNSPECIFIED BACK PAIN LATERALITY, UNSPECIFIED CHRONICITY: ICD-10-CM

## 2021-05-24 DIAGNOSIS — G40.109 FOCAL EPILEPSY (HCC): Primary | ICD-10-CM

## 2021-05-24 PROCEDURE — 99213 OFFICE O/P EST LOW 20 MIN: CPT | Performed by: NURSE PRACTITIONER

## 2021-05-24 NOTE — TELEPHONE ENCOUNTER
Called and discussed with patient  Said that he has a virtual appointment with Diego Ashton this afternoon and would like to discuss this with her in more detail before making a decision

## 2021-05-24 NOTE — PROGRESS NOTES
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, who is presenting to Neurology office for evaluation of his seizures  Virtual Brief Visit    Assessment/Plan:    Problem List Items Addressed This Visit        Nervous and Auditory    Focal epilepsy ([address] ) - Primary     Patient with continued events since prior visit concerning for seizures  He notes that whenever he has an aura it is associated with hypoglycemia  We discussed improving his dietary intake with smaller more frequent meals to avoid blood sugar fluctuations  He is currently on levetiracetam 1000 mg BID which he is tolerating well with the addition of B6 100 mg BID  Irritability has resolved with B6  Due to continued events, will have patient admitted to the EMU for characterization of events  His prior MRI brain was without focal source of seizures  Routine, sleep deprives, and prolonged 6 hour video EEGs were normal  No events were captured on EEG  Since this was a video visit, no exam was able to be performed  He will continue on his current dose of levetiracetam  He will call the office with any further events, could increase levetiracetam at that time prior to [facility] admission  Follow up in 3 months with Dr Jay Guzmán, after [facility] admission is completed  Other    Thoracic back pain     Patient notes thoracic back pain related to prior compression fractures  This has been ongoing  He also reports leg weakness with over exertion over the past week  He is agreeable to restarting PT  Will request that he calls spine specialist to report this weakness            Relevant Orders    Ambulatory referral to Physical Therapy                Reason for visit is   Chief Complaint   Patient presents with    Virtual Brief Visit     Focal epilespy    Virtual Brief Visit        Encounter provider CARTER Vincent    Provider located at [address] Greg CASAS [address]  [phone]    Recent Visits  No visits were found meeting these conditions  Showing recent visits within past 7 days and meeting all other requirements     Today's Visits  Date Type Provider Dept   05/24/21 Telemedicine [facility]   Showing today's visits and meeting all other requirements     Future Appointments  No visits were found meeting these conditions  Showing future appointments within next 150 days and meeting all other requirements        After connecting through [address] and patient was informed that this is a secure, HIPAA-compliant platform  He agrees to proceed  , the patient was identified by name and date of birth  Noland Koyanagi was informed that this is a telemedicine visit and that the visit is being conducted through [facility] and patient was informed that this is a secure, HIPAA-compliant platform  He agrees to proceed     My office door was closed  The patient was notified the following individuals were present in the room [place], [place]  He acknowledged consent and understanding of privacy and security of the platform  The patient has agreed to participate and understands he can discontinue the visit at any time  Patient is aware this is a billable service  Attempted to perform video visit through [address] [place] [address] but patient was having difficulties connecting and was agreeable to change to a telephone visit  Subjective  gabe Clark is a 38 y o  male with 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, who is presenting to Neurology office for evaluation of his seizures  He was seen by Dr Dia Ruiz for original consult on 2/24/2021  At that time, Dr Dia Ruiz noted that his events are most consistent with seizures  His prior event with loss of consciousness, whole-body shaking, and a lateral tongue bite very characteristic of a generalized tonic-clonic seizure    The smaller he event he had with staring, unresponsiveness, and a chewing motion of his mouth would be concerning for a focal impaired awareness seizure with oral automatisms  Because he has had multiple events, he would meet a diagnosis of epilepsy and would benefit from staying on seizure medications going forward  He was on levetiracetam which he was tolerating well but noted to be a low dose so was increased to 1000 mg BID  MRI brain and routine EEG were ordered  He was made aware that he was unable to drive until seizure free for at least 6 months  He was noted to have recent compression fractures in his spine as well  There were notes mild PTSD symptoms and trouble with anger at times  Since his last visit, he has had a few more episodes of focal seizures  He reports an aura feeling (feels like he is evaporating, every single pore is open and gets really sweaty and shaky) and with one of his most recent events, he told his wife and she checked his blood sugar and it was 52  He started eating to try to bring his blood sugar up  This did seem to help  He noticed that every time he has a aura, his blood sugar is low  He notes he feels detached from his body and is watching from above  He feels inside his head  This has preceded his first 2 grand mal seizures  One day he was feeling perfectly fine while making breakfast and he suddenly felt like he needed to drink water  He started drinking a lot of water and had the aura feeling  He walked to the bathroom to check his blood sugar and he woke up a few minutes later on all fours vomiting up water  He then woke up later again and he was facing another direction and his face hurt really bad  His eye was swollen shut and she took him to the hospital  Imaging reveals soft tissue swelling  No seizures since the ones he called to report on 4/19  His back is still healing and sore from the compression fractures  He was going to PT but due to missed appointments he was discharged  He is agreeable to seeing PT again so order will be placed  He is currently on levetiracetam 1000 mg BID  He had irritability in the beginning  Takes B6 twice per day (100 MG BID) and irritability has resolved  He denies any history of diabetes  His diet is not great  He has lost a lot of his appetite  He can barely finish a meal even if he is really hungry  Dizziness/lightheaded - occasional  Occurs with his focal seizures, right before his aura  Leg weakness - One leg has been sore and weak  This has been going on for over a week  He is unsure what caused it  Thinks it is because some days he exerts himself more than others  He has been going out a lot  He was seeing physical therapy but then he had an episode where he fell and smashed his face and missed appointments so has not been following  He has not been driving due to his events  Current seizure medications:  - levetiracetam 1000 mg BID  Other medications as per [facility]  Results for Luca Vaz (MRN [id number])   Ref  Range 2021 14:29   LEVETIRACETA (KEPPRA) Latest Ref Range: 10 0 - 40 0 ug/mL 24 3       Event/Seizure semiology:  1  Generalized tonic clonic seizure  Possible aura of odd sensation prior to initial event, otherwise no warning  He would shake all over and be unresponsive  Lateral tongue bite with one event  This has occurred twice, most recently on 1/15/2021  2  Possible focal impaired awareness seizure: no clear prodrome, he was staring and unresponsive, reportedly with left hand repetitive clenching and chewing motion of mouth  This occurred once on 2021  3  Aura of feeling out of body, associated with low blood sugar   Has preceded two of his GTC seizures in the past       Special Features  Status epilepticus: none  Self Injury Seizures: tongue laceration, compression fracture in back  Precipitating Factors: lack of sleep     Epilepsy Risk Factors:  Abnormal pregnancy:                          no  Abnormal birth/:                    no  Abnormal Development:                     no  Febrile seizures, simple:                     no  Febrile seizures, complex:                  no  CNS infection:                                     no  Intellectual Disability:                          no  Cerebral palsy:                                    no  Head injury (moderate/severe):          no  CNS neoplasm:                                   no  CNS malformation:                             no  Neurosurgical procedure:                   no  Stroke:                                                 no  Alcohol abuse:                                    no  Drug abuse:                                        no  Family history Sz/epilepsy:                 no     Prior AEDs:  None other than Levetiracetam      Prior Evaluation:  - CTA head and neck: 1/29/2021:Normal CT of the brain  Normal CTA of the neck and brain    - MRI brain seizure w wo contrast 3/10/2021:  IMPRESSION:  No acute intracranial disease  No temporal lobe pathology  Borderline cerebellar tonsillar ectopia  - Routine EEG 3/11/2021: Normal  - Sleep deprived EEG 3/18/2021: Normal  - 6 hour video EEG 5/20/2021: Normal  No events captured  - PET scan brain : none  - Neuropsychologic testing: none     Psychiatric History:  Depression: no  Anxiety: yes when in the [employer] and possible PTSD  Psychosis: no  Psychiatric Admissions: none     I reviewed prior neurology notes, EEG reports, MRI brain report, and recent labs, as documented in [employer], and summarized above           Past Medical History:   Diagnosis Date    Anxiety     Brain bleed ([facility] [address] ) 2012    GERD (gastroesophageal reflux disease)     Migraine     Seizures (HCC)        Past Surgical History:   Procedure Laterality Date    EPIDURAL BLOCK INJECTION N/A 2/11/2021    Procedure: T9 T10 INTERLAMINAR THORACIC EPIDURAL STEROID INJECTION;  Surgeon: Shayy Mesa MD;  Location: [facility];  Service: Pain Management     FINGER SURGERY Right     5th digit    NERVE BLOCK Bilateral 1/21/2021    Procedure: T7 T8 T9 T10 MEDIAL BRANCH BLOCK #1;  Surgeon: Bhavna Caceres MD;  [facility];  Service: Pain Management     WISDOM TOOTH EXTRACTION         Current Outpatient Medications   Medication Sig Dispense Refill    b complex vitamins tablet Take 1 tablet by mouth daily      levETIRAcetam (KEPPRA) 500 mg tablet TAKE 2 TABLETS (1,000 MG TOTAL) BY MOUTH EVERY 12 (TWELVE) HOURS 360 tablet 3    Multiple Vitamin (multivitamin) capsule Take 1 capsule by mouth daily      ascorbic acid (VITAMIN C) 250 mg tablet Take 250 mg by mouth daily      Magnesium 500 MG CAPS Take by mouth       No current facility-administered medications for this visit  Allergies   Allergen Reactions    Sumatriptan Rash     Burning sensation   skin gets warm  skin gets warm         Review of Systems  Review of Systems   Constitutional: Positive for appetite change  Negative for fever  HENT: Negative  Negative for hearing loss, tinnitus, trouble swallowing and voice change  Eyes: Negative  Negative for photophobia and pain  Respiratory: Negative  Negative for shortness of breath  Cardiovascular: Negative  Negative for palpitations  Gastrointestinal: Negative  Negative for nausea and vomiting  Endocrine: Negative  Negative for cold intolerance  Genitourinary: Negative  Negative for dysuria, frequency and urgency  Musculoskeletal: Positive for back pain  Negative for myalgias and neck pain  Skin: Negative  Negative for rash  Neurological: Positive for dizziness (Every once in a while), seizures, weakness (Left leg), light-headedness (Sometimes) and headaches (Sometimes)  Negative for tremors, syncope, facial asymmetry, speech difficulty and numbness  Hematological: Negative  Does not bruise/bleed easily  Psychiatric/Behavioral: Negative  Negative for confusion, hallucinations and sleep disturbance  ROS obtained by MA and reviewed by myself  Vitals:    05/24/21 1400   Weight: 72 6 kg (160 lb)   Height: 5' 9" (1 753 m)         I spent 20 minutes directly with the patient during this visit    [address] acknowledges that he has consented to an online visit or consultation  He understands that the online visit is based solely on information provided by him, and that, in the absence of a face-to-face physical evaluation by the physician, the diagnosis he receives is both limited and provisional in terms of accuracy and completeness  This is not intended to replace a full medical face-to-face evaluation by the physician  Therese Kerns understands and accepts these terms

## 2021-05-24 NOTE — TELEPHONE ENCOUNTER
----- Message from Bogdan Ramey MD sent at 5/24/2021  8:44 AM EDT -----  Please call patient about his EEG result  It doesn't appear that he had any events when he was on EEG  As noted before, it may be preferable to capture an event on EEG, but he may need to come into the EMU to capture one if he is willing  Alternatively, we could try to increase his Keppra further as per prior notes

## 2021-05-24 NOTE — PATIENT INSTRUCTIONS
1  Try small frequent meals  2  Continue current dose of levetiracetam 1000 mg twice per day  3  Call the office with further events  4  See PT  5   Follow up with Dr Alanis Contreras in 3 months    Get scheduled for EMU admission

## 2021-05-25 ENCOUNTER — TELEPHONE (OUTPATIENT)
Dept: NEUROLOGY | Facility: CLINIC | Age: 36
End: 2021-05-25

## 2021-05-25 NOTE — TELEPHONE ENCOUNTER
----- [facility] sent at 5/24/2021 10:56 PM EDT -----  Please recommend that the patient call his spine specialist to report his leg pain and weakness  He follows with them for thoracic compression fractures

## 2021-05-25 NOTE — TELEPHONE ENCOUNTER
Called and left a message to call the office back  Will also need to discuss scheduling EMU admission

## 2021-05-25 NOTE — ASSESSMENT & PLAN NOTE
Patient notes thoracic back pain related to prior compression fractures  This has been ongoing  He also reports leg weakness with over exertion over the past week  He is agreeable to restarting PT  Will request that he calls spine specialist to report this weakness

## 2021-05-25 NOTE — ASSESSMENT & PLAN NOTE
Patient with continued events since prior visit concerning for seizures  He notes that whenever he has an aura it is associated with hypoglycemia  We discussed improving his dietary intake with smaller more frequent meals to avoid blood sugar fluctuations  He is currently on levetiracetam 1000 mg BID which he is tolerating well with the addition of B6 100 mg BID  Irritability has resolved with B6  Due to continued events, will have patient admitted to the EMU for characterization of events  His prior MRI brain was without focal source of seizures  Routine, sleep deprives, and prolonged 6 hour video EEGs were normal  No events were captured on EEG  Since this was a video visit, no exam was able to be performed  He will continue on his current dose of levetiracetam  He will call the office with any further events, could increase levetiracetam at that time prior to [facility] admission  Follow up in 3 months with Dr Angelina Hyatt, after [facility] admission is completed

## 2021-09-30 ENCOUNTER — TELEPHONE (OUTPATIENT)
Dept: NEUROLOGY | Facility: CLINIC | Age: 36
End: 2021-09-30

## 2021-09-30 NOTE — TELEPHONE ENCOUNTER
Patients wife calling to discuss symptoms  States he has been having seizures more frequently  More focal seizures but in a pattern a few times per week  typically during week, nights after he has soccer practice with kids  Patient gets tired, confused seeming and ends up falling asleep  Wakes up scared not knowing what is going on  this happens 3 times per week  More importantly she is also noting patient has been having increased anger issues for no known reason  She is fearful of this as patient seems could actually get violent  Sensitive subject, as patient doesn't recognize he is acting like this  Medications:  levetiracetam 1000mg BID  B6 200mg (?)      [phone] - wife Aarti Garcia

## 2021-09-30 NOTE — TELEPHONE ENCOUNTER
Scheduled patient for Monday 10/4 in cv office with Regulo Crabtree agreeable to appt  Provided date, address, time, etc no further questions at this time

## 2021-09-30 NOTE — TELEPHONE ENCOUNTER
We should try to get him into the office more urgently to discuss  We may need to shift a stable patient off of my or Kasie's schedule to another AP in order to accommodate him  If he is having seizures and severe irritability with fears of anger, then we can't simply go up on the Levetiracetam  If she is concerned that he is going to harm himself or others, they need to take him to the ED

## 2021-10-04 ENCOUNTER — OFFICE VISIT (OUTPATIENT)
Dept: NEUROLOGY | Facility: CLINIC | Age: 36
End: 2021-10-04
Payer: COMMERCIAL

## 2021-10-04 VITALS
BODY MASS INDEX: 25.48 KG/M2 | HEIGHT: 69 IN | SYSTOLIC BLOOD PRESSURE: 112 MMHG | DIASTOLIC BLOOD PRESSURE: 62 MMHG | WEIGHT: 172 LBS

## 2021-10-04 DIAGNOSIS — G40.109 FOCAL EPILEPSY (HCC): Primary | ICD-10-CM

## 2021-10-04 PROCEDURE — 99214 OFFICE O/P EST MOD 30 MIN: CPT | Performed by: PSYCHIATRY & NEUROLOGY

## 2021-10-04 RX ORDER — MULTIVITAMIN WITH IRON
100 TABLET ORAL DAILY
COMMUNITY
End: 2021-12-28 | Stop reason: ALTCHOICE

## 2021-10-04 RX ORDER — DIVALPROEX SODIUM 500 MG/1
TABLET, EXTENDED RELEASE ORAL
Qty: 60 TABLET | Refills: 11 | Status: SHIPPED | OUTPATIENT
Start: 2021-10-04

## 2021-12-28 ENCOUNTER — TELEMEDICINE (OUTPATIENT)
Dept: NEUROLOGY | Facility: CLINIC | Age: 36
End: 2021-12-28
Payer: COMMERCIAL

## 2021-12-28 DIAGNOSIS — G40.109 FOCAL EPILEPSY (HCC): Primary | ICD-10-CM

## 2021-12-28 PROCEDURE — 99212 OFFICE O/P EST SF 10 MIN: CPT | Performed by: NURSE PRACTITIONER

## 2021-12-28 RX ORDER — DIVALPROEX SODIUM 250 MG/1
250 TABLET, DELAYED RELEASE ORAL EVERY 12 HOURS SCHEDULED
Qty: 180 TABLET | Refills: 3 | Status: SHIPPED | OUTPATIENT
Start: 2021-12-28

## 2022-02-05 ENCOUNTER — APPOINTMENT (EMERGENCY)
Dept: RADIOLOGY | Facility: HOSPITAL | Age: 37
End: 2022-02-05
Payer: COMMERCIAL

## 2022-02-05 ENCOUNTER — HOSPITAL ENCOUNTER (EMERGENCY)
Facility: HOSPITAL | Age: 37
Discharge: HOME | End: 2022-02-05
Attending: STUDENT IN AN ORGANIZED HEALTH CARE EDUCATION/TRAINING PROGRAM | Admitting: STUDENT IN AN ORGANIZED HEALTH CARE EDUCATION/TRAINING PROGRAM
Payer: COMMERCIAL

## 2022-02-05 VITALS
WEIGHT: 180 LBS | TEMPERATURE: 98 F | HEIGHT: 68 IN | BODY MASS INDEX: 27.28 KG/M2 | HEART RATE: 88 BPM | RESPIRATION RATE: 16 BRPM | OXYGEN SATURATION: 99 % | DIASTOLIC BLOOD PRESSURE: 82 MMHG | SYSTOLIC BLOOD PRESSURE: 126 MMHG

## 2022-02-05 DIAGNOSIS — S93.402A LEFT ANKLE SPRAIN: ICD-10-CM

## 2022-02-05 DIAGNOSIS — S62.309B OPEN FRACTURE OF HEAD OF METACARPAL BONE: Primary | ICD-10-CM

## 2022-02-05 PROCEDURE — 99284 EMERGENCY DEPT VISIT MOD MDM: CPT

## 2022-02-05 PROCEDURE — 90471 IMMUNIZATION ADMIN: CPT

## 2022-02-05 PROCEDURE — 73610 X-RAY EXAM OF ANKLE: CPT

## 2022-02-05 PROCEDURE — 99284 EMERGENCY DEPT VISIT MOD MDM: CPT | Performed by: STUDENT IN AN ORGANIZED HEALTH CARE EDUCATION/TRAINING PROGRAM

## 2022-02-05 PROCEDURE — 73130 X-RAY EXAM OF HAND: CPT

## 2022-02-05 PROCEDURE — 73630 X-RAY EXAM OF FOOT: CPT

## 2022-02-05 PROCEDURE — 90715 TDAP VACCINE 7 YRS/> IM: CPT | Performed by: STUDENT IN AN ORGANIZED HEALTH CARE EDUCATION/TRAINING PROGRAM

## 2022-02-05 PROCEDURE — 29125 APPL SHORT ARM SPLINT STATIC: CPT | Performed by: STUDENT IN AN ORGANIZED HEALTH CARE EDUCATION/TRAINING PROGRAM

## 2022-02-05 RX ORDER — CEPHALEXIN 250 MG/1
500 CAPSULE ORAL ONCE
Status: COMPLETED | OUTPATIENT
Start: 2022-02-05 | End: 2022-02-05

## 2022-02-05 RX ORDER — CEPHALEXIN 500 MG/1
500 CAPSULE ORAL EVERY 6 HOURS SCHEDULED
Qty: 11 CAPSULE | Refills: 0 | Status: SHIPPED | OUTPATIENT
Start: 2022-02-05 | End: 2022-02-08

## 2022-02-05 RX ORDER — ACETAMINOPHEN 325 MG/1
975 TABLET ORAL ONCE
Status: COMPLETED | OUTPATIENT
Start: 2022-02-05 | End: 2022-02-05

## 2022-02-05 RX ADMIN — TETANUS TOXOID, REDUCED DIPHTHERIA TOXOID AND ACELLULAR PERTUSSIS VACCINE, ADSORBED 0.5 ML: 5; 2.5; 8; 8; 2.5 SUSPENSION INTRAMUSCULAR at 03:22

## 2022-02-05 RX ADMIN — CEPHALEXIN 500 MG: 250 CAPSULE ORAL at 03:20

## 2022-02-05 RX ADMIN — ACETAMINOPHEN 975 MG: 325 TABLET ORAL at 02:34

## 2022-02-05 NOTE — ED NOTES
Patient transported to [facility] Dayv Paxton [place], RN  02/05/22 [id number]

## 2022-02-05 NOTE — DISCHARGE INSTRUCTIONS
You were evaluated in the ED for a hand and ankle injury  The x-rays that were obtained showed a middle hand fracture  Because there is an opening near the site of the fracture, you are being prescribed antibiotics  Please take as directed  For pain, you can take Motrin 600 mg every 6 hours and/or Tylenol 1000 mg every 6 hours  Continue to apply ice--20 minutes on, 20 minutes off  And orthopedic referral was provided  Expect a phone call on Monday morning to set up the appointment  Do not hesitate to return to the emergency department for any concerning signs or symptoms

## 2022-02-05 NOTE — ED PROVIDER NOTES
History  Chief Complaint   Patient presents with    Hand Injury - Major     Patient slipped on broken step, got mad and punched wall  Right hand deformity  Tetanus not UTD  History provided by:  Patient  Hand Injury  Location:  Hand  Hand location:  R hand  Injury: yes    Time since incident:  45 minutes  Mechanism of injury comment:  Patient punched a wall  Pain details:     Quality:  Aching    Radiates to:  Does not radiate    Severity:  Mild    Onset quality:  Sudden    Duration:  1 hour    Timing:  Constant    Progression:  Unchanged  Handedness:  Right-handed  Dislocation: no    Foreign body present:  No foreign bodies  Prior injury to area:  No  Relieved by:  None tried  Worsened by: Movement  Ineffective treatments:  None tried  Associated symptoms: decreased range of motion and swelling    Associated symptoms: no numbness and no tingling      39year old M  Presents to the ED with a left ankle injury and right hand injury  He states that he slipped on a broken step and sustained an inversion of the left ankle  Out of frustration, the patient punched the wall and struck the wooden beam  The patient's right hand immediately developed swelling/hematoma  He currently expresses pain along the right 2nd and 3rd metacarpal bones  Describes his pain as achy in nature and 3/10 in severity  Movement exacerbates his pain  The patient is able to move his fingers without difficulty  Hasn't taken anything for pain prior to arrival  The patient also expresses left lateral malleoli and lateral foot pain  The injuries occurred approximately 45 minutes prior to arrival      Prior to Admission Medications   Prescriptions Last Dose Informant Patient Reported? Taking?    Cholecalciferol 25 MCG (1000 UT) tablet Not Taking at Unknown time  Yes No   Sig: Take 1,000 Units by mouth   Patient not taking: Reported on 2/5/2022    Magnesium 500 MG CAPS Not Taking at Unknown time  Yes No   Sig: Take by mouth   Patient not taking: Reported on 10/4/2021   Multiple Vitamin (multivitamin) capsule Not Taking at Unknown time  Yes No   Sig: Take 1 capsule by mouth daily   Patient not taking: Reported on 10/4/2021   ascorbic acid (VITAMIN C) 250 mg tablet Not Taking at Unknown time  Yes No   Sig: Take 250 mg by mouth daily   Patient not taking: Reported on 10/4/2021   b complex vitamins tablet Not Taking at Unknown time  Yes No   Sig: Take 1 tablet by mouth daily   Patient not taking: Reported on 2/5/2022    divalproex sodium (Depakote ER) 500 mg 24 hr tablet 2/4/2022 at Unknown time  No Yes   Sig: Take 1 tab (500 mg) nightly for 1 week, then take 1 tab (500 mg) twice a day   divalproex sodium (Depakote) 250 mg EC tablet 2/4/2022 at Unknown time  No Yes   Sig: Take 1 tablet (250 mg total) by mouth every 12 (twelve) hours Take in addition to one 500 mg tablet twice per day for a total of 750 mg twice per day        Facility-Administered Medications: None       Past Medical History:   Diagnosis Date    Anxiety     Brain bleed ([facility] [address] ) 2012    GERD (gastroesophageal reflux disease)     Migraine     Seizures ([facility])        Past Surgical History:   Procedure Laterality Date    EPIDURAL BLOCK INJECTION N/A 2/11/2021    Procedure: T9 T10 INTERLAMINAR THORACIC EPIDURAL STEROID INJECTION;  Surgeon: Bhavna Caceres MD;  Location:  ENDO;  Service: Pain Management     FINGER SURGERY Right     5th digit    NERVE BLOCK Bilateral 1/21/2021    Procedure: T7 T8 T9 T10 MEDIAL BRANCH BLOCK #1;  Surgeon: Bhavna Caceres MD;  Location:  ENDO;  Service: Pain Management     WISDOM TOOTH EXTRACTION         Family History   Problem Relation Age of Onset    Thyroid disease Mother     Heart disease Father     Hypertension Father     No Known Problems Brother     Cancer Maternal Grandmother     No Known Problems Maternal Grandfather     Parkinsonism Paternal Grandmother     Heart disease Paternal Grandfather     Other Son         blounts disease    No Known Problems Daughter     Seizures Neg Hx      I have reviewed and agree with the history as documented  E-Cigarette/Vaping    E-Cigarette Use Never User      E-Cigarette/Vaping Substances     Social History     Tobacco Use    Smoking status: Former Smoker    Smokeless tobacco: Never Used   Vaping Use    Vaping Use: Never used   Substance Use Topics    Alcohol use: Not Currently    Drug use: Yes     Types: Marijuana     Review of Systems   Musculoskeletal: Positive for arthralgias and joint swelling  Skin: Positive for color change, rash and wound  Negative for pallor  Hematological: Does not bruise/bleed easily  All other systems reviewed and are negative  Physical Exam  Physical Exam  Vitals and nursing note reviewed  Constitutional:       General: He is in acute distress  Appearance: He is not ill-appearing or toxic-appearing  HENT:      Head: Normocephalic and atraumatic  Right Ear: External ear normal       Left Ear: External ear normal    Eyes:      General:         Right eye: No discharge  Left eye: No discharge  Extraocular Movements: Extraocular movements intact  Conjunctiva/sclera: Conjunctivae normal    Cardiovascular:      Rate and Rhythm: Normal rate and regular rhythm  Pulses: Normal pulses  Heart sounds: Normal heart sounds  Pulmonary:      Effort: Pulmonary effort is normal  No respiratory distress  Breath sounds: Normal breath sounds  No stridor  No wheezing, rhonchi or rales  Chest:      Chest wall: No tenderness  Abdominal:      General: Abdomen is flat  Bowel sounds are normal       Palpations: Abdomen is soft  Tenderness: There is no abdominal tenderness  There is no guarding or rebound  Musculoskeletal:         General: Swelling, tenderness and signs of injury present  Hands:         Feet:       Comments: Hematoma along the dorsal aspect of the right hand  Small abrasion along the 3rd MCP joint   Bleeding controlled  The patient is able to move the fingers without difficulty  Normal capillary refill  TTP along the hematoma  TTP along the left lateral malleolus, forefoot and lateral foot with associated swelling  No bruising noted  Decreased ROM of the ankle joint 2/2 pain  The left foot is NVI  Skin:     General: Skin is warm and dry  Capillary Refill: Capillary refill takes less than 2 seconds  Coloration: Skin is not jaundiced or pale  Findings: No bruising, erythema, lesion or rash  Neurological:      General: No focal deficit present  Mental Status: He is alert and oriented to person, place, and time  Mental status is at baseline  Cranial Nerves: No cranial nerve deficit  Sensory: No sensory deficit  Motor: No weakness  Psychiatric:         Mood and Affect: Mood normal          Behavior: Behavior normal          Thought Content:  Thought content normal          Judgment: Judgment normal        Vital Signs  ED Triage Vitals [02/05/22 0210]   Temperature Pulse Respirations Blood Pressure SpO2   98 °F (36 7 °C) 88 16 126/82 99 %      Temp Source Heart Rate Source Patient Position - Orthostatic VS BP Location FiO2 (%)   Temporal Monitor Sitting Left arm --      Pain Score       --           Vitals:    02/05/22 0210   BP: 126/82   Pulse: 88   Patient Position - Orthostatic VS: Sitting     ED Medications  Medications   acetaminophen (TYLENOL) tablet 975 mg (975 mg Oral Given 2/5/22 0234)   cephalexin (KEFLEX) capsule 500 mg (500 mg Oral Given 2/5/22 0320)   tetanus-diphtheria-acellular pertussis (BOOSTRIX) IM injection 0 5 mL (0 5 mL Intramuscular Given 2/5/22 0322)     Diagnostic Studies  Results Reviewed     None             XR ankle 3+ views LEFT   ED Interpretation by Noe Montano DO (02/05 0240)   No fracture/dislocation noted on ankle x-ray      XR foot 3+ views LEFT   ED Interpretation by Noe Montano DO (02/05 0240)   No fracture/dislocation noted on foot x-ray      XR hand 3+ views RIGHT   ED Interpretation by Yeni Chavarria DO (02/05 0244)   3rd metacarpal head fracture             Procedures  Splint application    Date/Time: 2/5/2022 3:10 AM  Performed by: Yeni Chavarria DO  Authorized by: Yeni Chavarria DO   Universal Protocol:  Consent: Verbal consent obtained  Consent given by: patient  Patient understanding: patient states understanding of the procedure being performed  Site marked: the operative site was marked    Pre-procedure details:     Sensation:  Normal    Skin color:  Pink  Procedure details:     Laterality:  Right    Location:  Hand    Hand:  R hand    Splint type:  Wrist    Supplies:  Cotton padding and Ortho-Glass  Post-procedure details:     Pain:  Unchanged    Sensation:  Normal    Skin color:  Pink    Patient tolerance of procedure: Tolerated well, no immediate complications       ED Course       SBIRT 20yo+      Most Recent Value   SBIRT (22 yo +)    In order to provide better care to our patients, we are screening all of our patients for alcohol and drug use  Would it be okay to ask you these screening questions? Yes Filed at: 02/05/2022 6373   Initial Alcohol Screen: US AUDIT-C     1  How often do you have a drink containing alcohol? 0 Filed at: 02/05/2022 0213   2  How many drinks containing alcohol do you have on a typical day you are drinking? 0 Filed at: 02/05/2022 0213   3a  Male UNDER 65: How often do you have five or more drinks on one occasion? 0 Filed at: 02/05/2022 0213   3b  FEMALE Any Age, or MALE 65+: How often do you have 4 or more drinks on one occassion? 0 Filed at: 02/05/2022 5425   Audit-C Score 0 Filed at: 02/05/2022 1866   CORA: How many times in the past year have you    Used an illegal drug or used a prescription medication for non-medical reasons? Never Filed at: 02/05/2022 1690        MDM     59-year-old male  Presents to the emergency department status post a left ankle injury, right hand injury  X-rays significant for a right 3rd metacarpal head fracture  No fracture/dislocation noted on left foot/ankle plain films  The right hand is NVI  The case was discussed with Dr Arpita Cain (Orthopedics)  A volar splint was applied  See procedural note above  Due to the patient having an abrasion/skin break along the right 3rd MCP joint, the patient was prescribed a short course of Keflex  Tetanus booster updated  Orthopedics referral provided  Opioid medication offered but the patient declined  Return precautions were discussed  All questions were addressed  The patient was stable for discharge  Disposition  Final diagnoses:   Open fracture of head of metacarpal bone   Left ankle sprain     Time reflects when diagnosis was documented in both MDM as applicable and the Disposition within this note     Time User Action Codes Description Comment    2/5/2022  3:18 AM Bertare Pata Add [S62 309B] Open fracture of head of metacarpal bone     2/5/2022  3:19 AM Bertare Pata Add [Z25 757Y] Left ankle sprain       ED Disposition     ED Disposition Condition Date/Time Comment    Discharge Stable Sat Feb 5, 2022  3:16 AM Pj Schfafer discharge to home/self care              Follow-up Information     Follow up With Specialties Details Why [address] [facility] Orthopedic Surgery In 3 days  [address] [facility]  [phone]            Discharge Medication List as of 2/5/2022  3:21 AM      START taking these medications    Details   cephalexin (KEFLEX) 500 mg capsule Take 1 capsule (500 mg total) by mouth every 6 (six) hours for 3 days, Starting Sat 2/5/2022, Until Tue 2/8/2022, Normal         CONTINUE these medications which have NOT CHANGED    Details   divalproex sodium (Depakote ER) 500 mg 24 hr tablet Take 1 tab (500 mg) nightly for 1 week, then take 1 tab (500 mg) twice a day, Normal      divalproex sodium (Depakote) 250 mg EC tablet Take 1 tablet (250 mg total) by mouth every 12 (twelve) hours Take in addition to one 500 mg tablet twice per day for a total of 750 mg twice per day , Starting Tue 12/28/2021, Normal      ascorbic acid (VITAMIN C) 250 mg tablet Take 250 mg by mouth daily, Historical Med      b complex vitamins tablet Take 1 tablet by mouth daily, Historical Med      Cholecalciferol 25 MCG (1000 UT) tablet Take 1,000 Units by mouth, Historical Med      Magnesium 500 MG CAPS Take by mouth, Historical Med      Multiple Vitamin (multivitamin) capsule Take 1 capsule by mouth daily, Historical Med             No discharge procedures on file      PDMP Review     None          ED Provider  Electronically Signed by           Jp Callahan DO  02/05/22 9173

## 2022-02-10 ENCOUNTER — OFFICE VISIT (OUTPATIENT)
Dept: OBGYN CLINIC | Facility: CLINIC | Age: 37
End: 2022-02-10
Payer: COMMERCIAL

## 2022-02-10 VITALS
DIASTOLIC BLOOD PRESSURE: 76 MMHG | HEART RATE: 71 BPM | HEIGHT: 68 IN | BODY MASS INDEX: 27.28 KG/M2 | TEMPERATURE: 97.4 F | WEIGHT: 180 LBS | SYSTOLIC BLOOD PRESSURE: 120 MMHG

## 2022-02-10 DIAGNOSIS — S93.402A LEFT ANKLE SPRAIN: ICD-10-CM

## 2022-02-10 DIAGNOSIS — M79.641 RIGHT HAND PAIN: ICD-10-CM

## 2022-02-10 DIAGNOSIS — S62.392A CLOSED NONDISPLACED FRACTURE OF OTHER PART OF THIRD METACARPAL BONE OF RIGHT HAND, INITIAL ENCOUNTER: Primary | ICD-10-CM

## 2022-02-10 PROBLEM — S62.302A CLOSED NONDISPLACED FRACTURE OF THIRD METACARPAL BONE OF RIGHT HAND: Status: ACTIVE | Noted: 2022-02-10

## 2022-02-10 PROCEDURE — 29075 APPL CST ELBW FNGR SHORT ARM: CPT | Performed by: ORTHOPAEDIC SURGERY

## 2022-02-10 PROCEDURE — 99204 OFFICE O/P NEW MOD 45 MIN: CPT | Performed by: ORTHOPAEDIC SURGERY

## 2022-02-10 NOTE — PROGRESS NOTES
ASSESSMENT/PLAN:    Problem List Items Addressed This Visit        Musculoskeletal and Integument    Closed nondisplaced fracture of third metacarpal bone of right hand - Primary    Relevant Orders    Fracture / Dislocation Treatment       Other    Right hand pain      Other Visit Diagnoses     Left ankle sprain              X-rays reviewed and discussed with the patient, which demonstrates a 3rd metacarpal head fracture without significant displacement  The patient was made aware that the first line treatment of these fractures is immobilization with a cast  The patient was fitted with a short arm cast today without complication  He was provided with cast care instructions  The patient was instructed to follow up in 1 week for recheck and repeat x-rays of the right hand  Return in about 1 week (around 2/17/2022)  _____________________________________________________  CHIEF COMPLAINT:  Chief Complaint   Patient presents with    Right Hand - Fracture         SUBJECTIVE:  Raegan Houston is a right hand dominant 39 y o  male who presents to the office today for initial evaluation of a right hand injury  The patient states that on 2/5/2022 he had slipped on the stairs, twisting his left ankle  He then punched a wall with his right hand in anger, causing pain and swelling at the 2nd-3rd digits  The patient was seen at the ER that night where xrays of the right hand showed a 3rd metacarpal fracture  The patient was fitted with a volar splint  He was also prescribed a course of Keflex due to a skin break above the fracture site  Today the patient states that he has maintained the splint as instructed  He continues to complain of pain and swelling at the 2nd-3rd digits  The patient admits to tingling in "all fingers" since the injury occurred  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 before, treated conservatively, but can't remember any details   The patient did not have any complaints or concerns about his left ankle         PAST MEDICAL HISTORY:  Past Medical History:   Diagnosis Date    Anxiety     Brain bleed ([facility] [address] ) 2012    GERD (gastroesophageal reflux disease)     Migraine     Seizures ([facility] [address] )        PAST SURGICAL HISTORY:  Past Surgical History:   Procedure Laterality Date    EPIDURAL BLOCK INJECTION N/A 2/11/2021    Procedure: T9 T10 INTERLAMINAR THORACIC EPIDURAL STEROID INJECTION;  Surgeon: Josselyn Vines MD;  [facility]: [facility];  Service: Pain Management     FINGER SURGERY Right     5th digit    NERVE BLOCK Bilateral 1/21/2021    Procedure: T7 T8 T9 T10 MEDIAL BRANCH BLOCK #1;  Surgeon: Josselyn Vines MD;  [facility]: [facility];  Service: Pain Management     WISDOM TOOTH EXTRACTION         FAMILY HISTORY:  Family History   Problem Relation Age of Onset    Thyroid disease Mother     Heart disease Father     Hypertension Father     No Known Problems Brother     Cancer Maternal Grandmother     No Known Problems Maternal Grandfather     Parkinsonism Paternal Grandmother     Heart disease Paternal Grandfather     Other Son         blounts disease    No Known Problems Daughter     Seizures Neg Hx        SOCIAL HISTORY:  Social History     Tobacco Use    Smoking status: Former Smoker     Types: Cigarettes    Smokeless tobacco: Never Used   Vaping Use    Vaping Use: Never used   Substance Use Topics    Alcohol use: Not Currently    Drug use: Yes     Types: Marijuana     Comment: medical marijuana       MEDICATIONS:    Current Outpatient Medications:     divalproex sodium (Depakote ER) 500 mg 24 hr tablet, Take 1 tab (500 mg) nightly for 1 week, then take 1 tab (500 mg) twice a day, Disp: 60 tablet, Rfl: 11    divalproex sodium (Depakote) 250 mg EC tablet, Take 1 tablet (250 mg total) by mouth every 12 (twelve) hours Take in addition to one 500 mg tablet twice per day for a total of 750 mg twice per day , Disp: 180 tablet, Rfl: 3    ascorbic acid (VITAMIN C) 250 mg tablet, Take 250 mg by mouth daily (Patient not taking: Reported on 10/4/2021), Disp: , Rfl:     b complex vitamins tablet, Take 1 tablet by mouth daily (Patient not taking: Reported on 2/5/2022 ), Disp: , Rfl:     Cholecalciferol 25 MCG (1000 UT) tablet, Take 1,000 Units by mouth (Patient not taking: Reported on 2/5/2022 ), Disp: , Rfl:     Magnesium 500 MG CAPS, Take by mouth (Patient not taking: Reported on 10/4/2021), Disp: , Rfl:     Multiple Vitamin (multivitamin) capsule, Take 1 capsule by mouth daily (Patient not taking: Reported on 10/4/2021), Disp: , Rfl:     ALLERGIES:  Allergies   Allergen Reactions    Sumatriptan Rash     Burning sensation   skin gets warm  skin gets warm         Review of systems:   Constitutional: Negative for fatigue, fever or loss of apetite  HENT: Negative  Respiratory: Negative for shortness of breath, dyspnea  Cardiovascular: Negative for chest pain/tightness  Gastrointestinal: Negative for abdominal pain, N/V  Endocrine: Negative for cold/heat intolerance, unexplained weight loss/gain  Genitourinary: Negative for flank pain, dysuria, hematuria  Musculoskeletal: pain in right hand as stated in HPI   Skin: Negative for rash  Neurological: negative for numbness or tingling  Psychiatric/Behavioral: Negative for agitation  _____________________________________________________  PHYSICAL EXAMINATION:    Blood pressure 120/76, pulse 71, temperature (!) 97 4 °F (36 3 °C), temperature source Temporal, height 5' 7 5" (1 715 m), weight 81 6 kg (180 lb)      General: well developed and well nourished, alert, oriented times 3 and appears comfortable  Psychiatric: Normal  HEENT: Benign  Cardiovascular: Regular    Pulmonary: No wheezing or stridor  Abdomen: Soft, Nontender  Skin: No masses, erythema, lacerations, fluctation, ulcerations  Neurovascular: strong distal pulses, sensory and motor testing intact    MUSCULOSKELETAL EXAMINATION:    Right Hand:  - Splint was removed today in office without complication  - There is swelling present along the metacarpals and the 2nd and 3rd digits  There is a superficial skin abrasion noted at the 3rd metacarpal head, healing without erythema, bleeding, or drainage  Ecchymosis present along the hand and digits  - There is palpable tenderness along the 3rd metacarpal head and MCP joint  Palpable tenderness present along the distal 4th metacarpal  - Active ROM of all digits, including 3rd digit PIP, DIP, and MCP joint  Pain noted with flexion of the MCP joint   - Sensation and motor intact along the radial, median, and ulnar nerve distributions  - Strong radial pulse  - Brisk cap refill in all fingers    _____________________________________________________  STUDIES REVIEWED:  I have personally reviewed pertinent films and reports in PACS  Pertinent ED notes and radiology reports reviewed  X-rays of the right hand taken in office today demonstrates a 3rd metacarpal fracture at the head/neck level extending into the joint surface without significant articular surface displacement or malalignment  PROCEDURES PERFORMED:  Fracture / Dislocation Treatment    Date/Time: 2/10/2022 11:06 AM  Performed by: Brandon Borrego  Authorized by: Brandon Borrego     Patient Location:  Clinic  Verbal consent obtained?: Yes    Risks and benefits: Risks, benefits and alternatives were discussed    Consent given by:  Patient  Patient states understanding of procedure being performed: Yes    Site marked: Yes    Radiology Images displayed and confirmed   If images not available, report reviewed: Yes    Required items: Required blood products, implants, devices and special equipment available    Patient identity confirmed:  Verbally with patient  Time out: Immediately prior to the procedure a time out was called            Brandon Borrego

## 2022-02-10 NOTE — PATIENT INSTRUCTIONS
Cast Care   WHAT YOU NEED TO KNOW:   Cast care will help the cast dry and harden correctly, and then protect it until it comes off  Your cast may need up to 48 hours to dry and harden completely  Even after your cast hardens, it can be damaged  DISCHARGE INSTRUCTIONS:   Return to the emergency department if:   · Your cast breaks or gets damaged  · You see drainage, or your cast is stained or smells bad  · Your skin turns blue or pale  · Your skin tingles, burns, or is cold or numb  · You have severe pain that is getting worse and does not go away after you take pain medicine  · Your limb swells, or your cast looks or feels tighter than it was before  Contact your healthcare provider if:   · Something falls into your cast and gets stuck  · You have itching, pain, burning, or weakness where you have the cast      · You have a fever  · You have sores, blisters, or breaks on the skin around the edges of the cast     · You have questions or concerns about your condition or care  Follow up with your healthcare provider as directed: You will need to return to have your cast removed and your bones checked  Write down your questions so you remember to ask them during your visits  Care for your cast while it hardens:   · Protect the cast   Do not put weight on the cast  Do not bend, lean on, or hit the cast with anything  Use the palms of your hands when you move the cast  Do not use your fingers  Your fingers may leave marks on the cast as it dries  · Change positions often  Change your position every 2 hours to help the cast dry faster  Prop your cast on something soft, such as a pillow, to prevent a flat area on your cast      · Keep the cast dry  Tie plastic trash bags around your cast to keep it dry while you bathe  You may use a blow dryer on cool or the lowest heat setting to dry your cast if it gets wet  Do not use a high heat setting, because you may burn your skin   Certain casts can get wet  Ask if you have a waterproof cast     Care for your cast after it hardens:   · Check your cast every day  Contact your healthcare provider if you notice any cracks, dents, holes, or flaking on your cast      · Keep your cast clean and dry  Cover your cast with a towel when you eat  You may have a small piece of cast that can be removed to check on incisions under your cast  Make sure the small piece of cast is kept tightly closed  If your cast gets dirty, use a mild detergent and a damp washcloth to wipe off the outside of your cast  Continue to cover your cast with trash bags to keep it dry while you bathe  · Care for the edges of your cast   Cover the cast edges to keep them smooth  Use 4 inch pieces of waterproof tape  Place one end of the tape under the inside edge of your cast and fold it over to the outside surface  Overlap tape strips until the edges are completely covered  Change the tape as directed  Do not pull or repair any of the padding from inside the cast  This could cause blisters and sores on the skin under your cast      · Keep weight off your cast   Do not let anyone push down or lean on your cast  This may cause it to break  · Do not use sharp objects  Do not use a sharp or pointed object to scratch under your cast  This may cause wounds that can get infected, or you may lose the item inside the cast  If your skin itches, blow cool air under the cast  You may also gently scratch your skin outside the cast with a cloth  © Copyright [employer] 2021 Information is for End User's use only and may not be sold, redistributed or otherwise used for commercial purposes  All illustrations and images included in CareNotes® are the copyrighted property of [employer]  or Dinesh Peña   The above information is an  only  It is not intended as medical advice for individual conditions or treatments   Talk to your doctor, nurse or pharmacist before following any medical regimen to see if it is safe and effective for you

## 2022-02-18 ENCOUNTER — OFFICE VISIT (OUTPATIENT)
Dept: OBGYN CLINIC | Facility: CLINIC | Age: 37
End: 2022-02-18
Payer: COMMERCIAL

## 2022-02-18 ENCOUNTER — HOSPITAL ENCOUNTER (OUTPATIENT)
Dept: RADIOLOGY | Facility: CLINIC | Age: 37
Discharge: HOME | End: 2022-02-18
Payer: COMMERCIAL

## 2022-02-18 VITALS
WEIGHT: 180 LBS | TEMPERATURE: 97.4 F | DIASTOLIC BLOOD PRESSURE: 80 MMHG | BODY MASS INDEX: 26.66 KG/M2 | HEIGHT: 69 IN | HEART RATE: 79 BPM | SYSTOLIC BLOOD PRESSURE: 118 MMHG

## 2022-02-18 DIAGNOSIS — S62.392D CLOSED NONDISPLACED FRACTURE OF OTHER PART OF THIRD METACARPAL BONE OF RIGHT HAND WITH ROUTINE HEALING, SUBSEQUENT ENCOUNTER: ICD-10-CM

## 2022-02-18 DIAGNOSIS — S62.392D CLOSED NONDISPLACED FRACTURE OF OTHER PART OF THIRD METACARPAL BONE OF RIGHT HAND WITH ROUTINE HEALING, SUBSEQUENT ENCOUNTER: Primary | ICD-10-CM

## 2022-02-18 PROCEDURE — 99213 OFFICE O/P EST LOW 20 MIN: CPT | Performed by: ORTHOPAEDIC SURGERY

## 2022-02-18 PROCEDURE — 73120 X-RAY EXAM OF HAND: CPT

## 2022-02-18 NOTE — PROGRESS NOTES
Patient Name:  Tatyana Eddy  MRN:  [id number]    Assessment     1  Closed nondisplaced fracture of other part of third metacarpal bone of right hand with routine healing, subsequent encounter  XR hand 2 vw right       Plan     1  The patient is now 2 weeks post initial injury  X-rays of the right hand taken in office were reviewed and discussed with patient, which shows early signs of healing and unchanged alignment  At this time patient was instructed to continue to maintain the cast   He will return to the office in 4 weeks for repeat right hand x-rays  The patient was instructed to call return to the office sooner if any problems arise  Return in about 4 weeks (around 3/18/2022) for Recheck  Subjective   Tatyana Eddy returns for follow-up of a right 3rd metacarpal fracture sustained on 02/05/2022 and manage conservatively with a cast   The patient is 2 week(s) post injury and returns for routine follow-up  Today the patient states that he is overall doing very well  He reports that he has been maintaining the cast as instructed  The patient denies any pain, numbness, tingling, or swelling in the right upper extremity  The patient has no questions or concerns today        Objective     /80   Pulse 79   Temp (!) 97 4 °F (36 3 °C) (Temporal)   Ht 5' 9" (1 753 m)   Wt 81 6 kg (180 lb)   BMI 26 58 kg/m²     Right hand:  -short-arm cast clean, dry, intact  -skin above and below the cast without lesions, erythema, ecchymosis, warmth, swelling, or other signs of infection or irritation  -patient is able to wiggle his fingers well  -intact elbow range of motion  -sensation intact along the median, radial, ulnar nerve distribution  -brisk cap refill in all fingers      Data Review     I have personally reviewed pertinent films and reports in PACS and my interpretation is as follows:      X-rays of the right hand taken today in office demonstrate a 3rd metacarpal fracture with early signs of healing  Unchanged alignment compared to previous x-rays        Félix Mehta PA-C

## 2022-02-22 ENCOUNTER — TELEPHONE (OUTPATIENT)
Dept: PAIN MEDICINE | Facility: CLINIC | Age: 37
End: 2022-02-22

## 2022-02-22 ENCOUNTER — TELEPHONE (OUTPATIENT)
Dept: CARDIOLOGY CLINIC | Facility: CLINIC | Age: 37
End: 2022-02-22

## 2022-02-22 DIAGNOSIS — M40.294 OTHER KYPHOSIS OF THORACIC REGION: Primary | ICD-10-CM

## 2022-02-22 DIAGNOSIS — M42.00 SCHEURMANN'S DISEASE: Primary | ICD-10-CM

## 2022-02-22 NOTE — TELEPHONE ENCOUNTER
See below request  Pt need new Rx for back brace ordered 3/10/21  States last year, his insurance did not cover it but he has different insurance now  Can new Rx be placed?  (Pt aware VS out of office this week)

## 2022-02-22 NOTE — TELEPHONE ENCOUNTER
Received E-mail from (Katie Salgado brace fitter)  Patient now is asking for TSLO brace since he has insurance now    Can we please reorder [de-identified] - old order is  Thanks     New order pended

## 2022-02-22 NOTE — TELEPHONE ENCOUNTER
Patient called in , he needs another referral or script for the brace  [employer], an [address] [facility]# [phone]  Fax# [phone]      Please be advised thank you    Pt can be reached @ [phone]

## 2022-03-18 ENCOUNTER — OFFICE VISIT (OUTPATIENT)
Dept: OBGYN CLINIC | Facility: CLINIC | Age: 37
End: 2022-03-18
Payer: COMMERCIAL

## 2022-03-18 ENCOUNTER — HOSPITAL ENCOUNTER (OUTPATIENT)
Dept: RADIOLOGY | Facility: CLINIC | Age: 37
Discharge: HOME | End: 2022-03-18
Payer: COMMERCIAL

## 2022-03-18 VITALS
BODY MASS INDEX: 26.37 KG/M2 | TEMPERATURE: 97.1 F | HEART RATE: 51 BPM | SYSTOLIC BLOOD PRESSURE: 126 MMHG | WEIGHT: 174 LBS | DIASTOLIC BLOOD PRESSURE: 80 MMHG | HEIGHT: 68 IN

## 2022-03-18 DIAGNOSIS — S62.392D CLOSED NONDISPLACED FRACTURE OF OTHER PART OF THIRD METACARPAL BONE OF RIGHT HAND WITH ROUTINE HEALING, SUBSEQUENT ENCOUNTER: Primary | ICD-10-CM

## 2022-03-18 DIAGNOSIS — M79.641 RIGHT HAND PAIN: ICD-10-CM

## 2022-03-18 DIAGNOSIS — S62.392D CLOSED NONDISPLACED FRACTURE OF OTHER PART OF THIRD METACARPAL BONE OF RIGHT HAND WITH ROUTINE HEALING, SUBSEQUENT ENCOUNTER: ICD-10-CM

## 2022-03-18 PROCEDURE — 99213 OFFICE O/P EST LOW 20 MIN: CPT | Performed by: ORTHOPAEDIC SURGERY

## 2022-03-18 PROCEDURE — 73120 X-RAY EXAM OF HAND: CPT

## 2022-03-18 NOTE — PROGRESS NOTES
ASSESSMENT/PLAN:    Problem List Items Addressed This Visit        Musculoskeletal and Integument    Closed nondisplaced fracture of third metacarpal bone of right hand - Primary    Relevant Orders    XR hand 2 vw right       Other    Right hand pain          The patient is now 6 weeks out from initial injury  X-rays taken in office were reviewed and discussed with the patient, which shows a well-healed fracture in unchanged alignment  Today the patient's cast was removed without complication  He notes some stiffness along the MCP joints of the fingers, but denies any pain or decreased sensation on exam   Patient was shown wrist, hand, and digit range of motion and stretching exercises to perform multiple times a day  The patient was assured that the stiffness is due to his immobilization in a cast and should continue to improve with time and exercise  He was cleared today to resume normal activities without restriction  The patient was instructed to call or return to the office if his symptoms worsen or fail to improve  Return if symptoms worsen or fail to improve       _____________________________________________________  CHIEF COMPLAINT:  Chief Complaint   Patient presents with    Follow-up         SUBJECTIVE:  Machelle Celis is a 39 y o  male who presents for follow up of a right 3rd metacarpal fracture sustained on 02/05/2022  The patient is now 6 weeks post injury and returns for routine follow-up  Today patient states that he is overall doing very well  He reports no pain, numbness, tingling or swelling in the upper extremity  The patient maintain the cast as instructed  He has no questions or concerns today        PAST MEDICAL HISTORY:  Past Medical History:   Diagnosis Date    Anxiety     Brain bleed ([facility] ) 2012    GERD (gastroesophageal reflux disease)     Migraine     Seizures ([facility] )        PAST SURGICAL HISTORY:  Past Surgical History:   Procedure Laterality Date    EPIDURAL BLOCK INJECTION N/A 2/11/2021    Procedure: T9 T10 INTERLAMINAR THORACIC EPIDURAL STEROID INJECTION;  Surgeon: Lencho Bustamante MD;  [facility]: [facility];  Service: Pain Management     FINGER SURGERY Right     5th digit    NERVE BLOCK Bilateral 1/21/2021    Procedure: T7 T8 T9 T10 MEDIAL BRANCH BLOCK #1;  Surgeon: Lencho Bustamante MD;  Location: [facility];  Service: Pain Management     WISDOM TOOTH EXTRACTION         FAMILY HISTORY:  Family History   Problem Relation Age of Onset    Thyroid disease Mother     Heart disease Father     Hypertension Father     No Known Problems Brother     Cancer Maternal Grandmother     No Known Problems Maternal Grandfather     Parkinsonism Paternal Grandmother     Heart disease Paternal Grandfather     Other Son         blounts disease    No Known Problems Daughter     Seizures Neg Hx        SOCIAL HISTORY:  Social History     Tobacco Use    Smoking status: Former Smoker     Types: Cigarettes    Smokeless tobacco: Never Used   Vaping Use    Vaping Use: Never used   Substance Use Topics    Alcohol use: Not Currently    Drug use: Yes     Types: Marijuana     Comment: medical marijuana       MEDICATIONS:    Current Outpatient Medications:     divalproex sodium (Depakote ER) 500 mg 24 hr tablet, Take 1 tab (500 mg) nightly for 1 week, then take 1 tab (500 mg) twice a day, Disp: 60 tablet, Rfl: 11    divalproex sodium (Depakote) 250 mg EC tablet, Take 1 tablet (250 mg total) by mouth every 12 (twelve) hours Take in addition to one 500 mg tablet twice per day for a total of 750 mg twice per day , Disp: 180 tablet, Rfl: 3    ascorbic acid (VITAMIN C) 250 mg tablet, Take 250 mg by mouth daily (Patient not taking: Reported on 10/4/2021), Disp: , Rfl:     b complex vitamins tablet, Take 1 tablet by mouth daily (Patient not taking: Reported on 2/5/2022 ), Disp: , Rfl:     Cholecalciferol 25 MCG (1000 UT) tablet, Take 1,000 Units by mouth (Patient not taking: Reported on 2/5/2022 ), Disp: , Rfl:     Magnesium 500 MG CAPS, Take by mouth (Patient not taking: Reported on 10/4/2021), Disp: , Rfl:     Multiple Vitamin (multivitamin) capsule, Take 1 capsule by mouth daily (Patient not taking: Reported on 10/4/2021), Disp: , Rfl:     ALLERGIES:  Allergies   Allergen Reactions    Sumatriptan Rash     Burning sensation   skin gets warm  skin gets warm         REVIEW OF SYSTEMS:  Pertinent items are noted in HPI  A comprehensive review of systems was negative       _____________________________________________________  PHYSICAL EXAMINATION:  General: well developed and well nourished, alert, oriented times 3 and appears comfortable  Psychiatric: Normal  HEENT:  Normocephalic, atraumatic  Cardiovascular:  Regular  Pulmonary: No wheezing or stridor  Skin: No masses, erthema, lacerations, fluctation, ulcerations  Neurovascular:  Strong distal pulses, sensory and motor testing intact    MUSCULOSKELETAL EXAMINATION:    Right hand:  -cast was removed today in office without complication  -there is a healing superficial lesion above the 3rd MCP joint without erythema, drainage, swelling, ecchymosis, or other signs of infection   -there is no palpable tenderness along the 3rd digit or 3rd metacarpal   -patient's full range of motion of the D IP and PIP joints of all digits  - patient lacks 0 5cm to full composite fist due to stiffness at the MCP joints  No pain with ROM testing  - Intact wrist ROM, supination, and pronation    - Sensation and motor intact along the radial, median, ulnar distributions  -strong radial pulse  -brisk cap refill all fingers    _____________________________________________________  STUDIES REVIEWED:  I have personally reviewed pertinent films and reports in PACS and my interpretation is as follows:     X-rays of the right hand taken in office demonstrates a well-healed 3rd metacarpal fracture in unchanged alignment compared to previous x-rays      PROCEDURES PERFORMED: Procedures  None performed today            Liam Rosales PA-C

## 2022-04-04 NOTE — PROGRESS NOTES
PT Evaluation     Today's date: 2022  Patient name: Sonal Nguyễn  : 1985  MRN: [id number]  Referring provider: Andrea Garcia PT  Dx:   Encounter Diagnosis     ICD-10-CM    1  Closed nondisplaced fracture of other part of third metacarpal bone of right hand with routine healing, subsequent encounter  J50 418K                   Assessment  Assessment details: Pt is a 38 YO male presenting to PT with pain, decreased AROM, strength and tolerance to activity  Pt would benefit from skilled intervention to address these issues and maximize overall function  Occupation- not working  Dominant- right; Involved- right        Goals  ST  Decrease pain to 0-2/10 in 4 weeks            2  Decrease swelling right hand in 6-8 weeks            3  [facility] in 6-8 weeks            4   Provide orthotic for protection, compression for support  LT  Increase functional motion and strength for independence with ADL and self care by DC            2  Ability to RTW and recreational activity by DC    Plan  Patient would benefit from: skilled physical therapy  Planned modality interventions: thermotherapy: hydrocollator packs, ultrasound and cryotherapy  Planned therapy interventions: activity modification, neuromuscular re-education, strengthening, stretching, therapeutic activities, therapeutic exercise and home exercise program  Frequency: 2x week  Duration in weeks: 4  Treatment plan discussed with: patient        Subjective Evaluation    History of Present Illness  Date of onset: 2022  Mechanism of injury: Pt tripped on a broken step, injuring his ankle  Pt became angry and punched a wall, fracturing his right 3rd MC  Pt was diagnosed with a closed non-displaced fracture of his right 2rd MC and was casted for 5 weeks until 3/18/2022      Pain  Current pain rating: 3  At best pain rating: 3  At worst pain rating: 10  Location: right RF> MF MCP  Quality: sharp and knife-like    Hand dominance: right    Treatments  Current treatment: physical therapy  Patient Goals  Patient goals for therapy: decreased edema, decreased pain, increased motion, increased strength, independence with ADLs/IADLs, return to sport/leisure activities and return to work          Objective     General Comments:      Wrist/Hand Comments  AROM right wrist E/F- 60/75                      MF MP- 15/65; PIP- 0/105; DIP- 0/75                      RF MP- 10/70; PIP- 0/105;  DIP- 0/75  Inspection- moderate swelling 3rd, 4th MCP  Strength- un-assessed due to pain locally  Circumference at MCP- R/L- 20 5/19 5 cm; MF- P1- 6 5 cm; RF P1- 6 2 cm  Palpation- tenderness at MF/RF MCP  Pt wearing compression glove, completing TGE and using heat/ice for comfort             Precautions: avoid heavy grasp until cleared in therapy      Manuals 4/6            STM 15                                                   Neuro Re-Ed             HP/pulsed biph 15                                                                                          Ther Ex             TGE 5x                                                                                                                                                                                     Modalities             US 15            CP 15

## 2022-04-06 ENCOUNTER — EVALUATION (OUTPATIENT)
Dept: PHYSICAL THERAPY | Facility: CLINIC | Age: 37
End: 2022-04-06
Payer: COMMERCIAL

## 2022-04-06 DIAGNOSIS — S62.392D CLOSED NONDISPLACED FRACTURE OF OTHER PART OF THIRD METACARPAL BONE OF RIGHT HAND WITH ROUTINE HEALING, SUBSEQUENT ENCOUNTER: Primary | ICD-10-CM

## 2022-04-06 PROCEDURE — 97035 APP MDLTY 1+ULTRASOUND EA 15: CPT | Performed by: PHYSICAL THERAPIST

## 2022-04-06 PROCEDURE — 97161 PT EVAL LOW COMPLEX 20 MIN: CPT | Performed by: PHYSICAL THERAPIST

## 2022-04-06 PROCEDURE — 97110 THERAPEUTIC EXERCISES: CPT | Performed by: PHYSICAL THERAPIST

## 2022-04-06 PROCEDURE — 97140 MANUAL THERAPY 1/> REGIONS: CPT | Performed by: PHYSICAL THERAPIST

## 2022-04-06 PROCEDURE — 97112 NEUROMUSCULAR REEDUCATION: CPT | Performed by: PHYSICAL THERAPIST

## 2022-04-08 ENCOUNTER — OFFICE VISIT (OUTPATIENT)
Dept: PHYSICAL THERAPY | Facility: CLINIC | Age: 37
End: 2022-04-08
Payer: COMMERCIAL

## 2022-04-08 DIAGNOSIS — S62.392D CLOSED NONDISPLACED FRACTURE OF OTHER PART OF THIRD METACARPAL BONE OF RIGHT HAND WITH ROUTINE HEALING, SUBSEQUENT ENCOUNTER: Primary | ICD-10-CM

## 2022-04-08 PROCEDURE — 97140 MANUAL THERAPY 1/> REGIONS: CPT

## 2022-04-08 PROCEDURE — 97112 NEUROMUSCULAR REEDUCATION: CPT

## 2022-04-08 PROCEDURE — 97110 THERAPEUTIC EXERCISES: CPT

## 2022-04-08 PROCEDURE — 97035 APP MDLTY 1+ULTRASOUND EA 15: CPT

## 2022-04-08 NOTE — PROGRESS NOTES
Daily Note     Today's date: 2022  Patient name: Rene Cook  : 1985  MRN: [id number]  Referring provider: Ghislaine Cheema PT  Dx:   Encounter Diagnosis     ICD-10-CM    1  Closed nondisplaced fracture of other part of third metacarpal bone of right hand with routine healing, subsequent encounter  D50 384D                   Subjective: Pt reports continued soreness in his dorsal wrist  "TGE are going good I am not able to complete all sequences "      Objective: See treatment diary below    Wrist/Hand Comments  AROM right wrist E/F- 60/75                      MF MP- 15/65; PIP- 0/105; DIP- 0/75                      RF MP- 10/70; PIP- 0/105; DIP- 0/75  Inspection- moderate swelling 3rd, 4th MCP  Strength- un-assessed due to pain locally  Circumference at MCP- R/L- 20 5/19 5 cm; MF- P1- 6 5 cm; RF P1- 6 2 cm  Palpation- tenderness at MF/RF MCP  Pt wearing compression glove, completing TGE and using heat/ice for comfort    Assessment: Tolerated treatment well today  Continue with current program while monitoring symptoms       Plan:  Continue with current program   Precautions: avoid heavy grasp until cleared in therapy      Manuals            STM 15 15                                                  Neuro Re-Ed             HP/pulsed biph 15 15                                                                                         Ther Ex             TGE 5x 5x                                                                                                                                                                                    Modalities             US 15 15           CP 15 15

## 2022-04-12 ENCOUNTER — OFFICE VISIT (OUTPATIENT)
Dept: PHYSICAL THERAPY | Facility: CLINIC | Age: 37
End: 2022-04-12
Payer: COMMERCIAL

## 2022-04-12 DIAGNOSIS — S62.392D CLOSED NONDISPLACED FRACTURE OF OTHER PART OF THIRD METACARPAL BONE OF RIGHT HAND WITH ROUTINE HEALING, SUBSEQUENT ENCOUNTER: Primary | ICD-10-CM

## 2022-04-12 PROCEDURE — 97112 NEUROMUSCULAR REEDUCATION: CPT | Performed by: PHYSICAL THERAPIST

## 2022-04-12 PROCEDURE — 97110 THERAPEUTIC EXERCISES: CPT | Performed by: PHYSICAL THERAPIST

## 2022-04-12 PROCEDURE — 97140 MANUAL THERAPY 1/> REGIONS: CPT | Performed by: PHYSICAL THERAPIST

## 2022-04-12 PROCEDURE — 97035 APP MDLTY 1+ULTRASOUND EA 15: CPT | Performed by: PHYSICAL THERAPIST

## 2022-04-12 NOTE — PROGRESS NOTES
Daily Note     Today's date: 2022  Patient name: Lolita West  : 1985  MRN: [id number]  Referring provider: Jerod Steele PT  Dx:   Encounter Diagnosis     ICD-10-CM    1  Closed nondisplaced fracture of other part of third metacarpal bone of right hand with routine healing, subsequent encounter  N06 555N                   Subjective: pt feels the isotoner glove is helping with swelling and soreness in her hand and MF  He feels occasional "clicking" which he feels is along the dorsal MCP area  Objective: See treatment diary below    PT provided pt with vishnu tapes to assist in maintaining radial alignment at the MCP during flexion  AROM right wrist E/F- 60/75                      MF MP- 10/65; PIP- 0/105; DIP- 0/75                      RF MP- 10/70; PIP- 0/105; DIP- 0/75  Inspection- moderate swelling 3rd, 4th MCP  Strength- un-assessed due to pain locally  Circumference at MCP- R/L- 20 5/19 5 cm; MF- P1- 6 5 cm; RF P1- 6 2 cm  Palpation- tenderness at MF/RF MCP  Pt wearing compression glove,vishnu tapes  Instructed to avoid full MCP flexion until pain decreases    Assessment: Tolerated treatment well  Patient would benefit from continued PT      Plan: Continue per plan of care        Continue with current program   Precautions: avoid heavy grasp until cleared in therapy      Manuals           STM 15 15 15          Vishnu tapes   2                                    Neuro Re-Ed             HP/pulsed biph 15 15 15                                                                                        Ther Ex             TGE 5x 5x 5x                                                                                                                                                                                   Modalities             US 15 15 15          CP 15 15 15

## 2022-04-14 ENCOUNTER — OFFICE VISIT (OUTPATIENT)
Dept: PHYSICAL THERAPY | Facility: CLINIC | Age: 37
End: 2022-04-14
Payer: COMMERCIAL

## 2022-04-14 DIAGNOSIS — S62.392D CLOSED NONDISPLACED FRACTURE OF OTHER PART OF THIRD METACARPAL BONE OF RIGHT HAND WITH ROUTINE HEALING, SUBSEQUENT ENCOUNTER: Primary | ICD-10-CM

## 2022-04-14 PROCEDURE — 97112 NEUROMUSCULAR REEDUCATION: CPT | Performed by: PHYSICAL THERAPIST

## 2022-04-14 PROCEDURE — 97035 APP MDLTY 1+ULTRASOUND EA 15: CPT | Performed by: PHYSICAL THERAPIST

## 2022-04-14 PROCEDURE — 97110 THERAPEUTIC EXERCISES: CPT | Performed by: PHYSICAL THERAPIST

## 2022-04-14 PROCEDURE — 97140 MANUAL THERAPY 1/> REGIONS: CPT | Performed by: PHYSICAL THERAPIST

## 2022-04-14 NOTE — PROGRESS NOTES
Daily Note     Today's date: 2022  Patient name: Zac Wheeler  : 1985  MRN: [id number]  Referring provider: Sherri Douglass, PT  Dx:   Encounter Diagnosis     ICD-10-CM    1  Closed nondisplaced fracture of other part of third metacarpal bone of right hand with routine healing, subsequent encounter  C26 294N                   Subjective: pt reports overall improvement with swelling and stiffness  Objective: See treatment diary below    PT provided pt with dacia tapes to assist in maintaining radial alignment at the MCP during flexion  AROM right wrist E/F- 60/75                      MF MP- 5/75; PIP- 0/105; DIP- 0/75                      RF MP- 0/75; PIP- 0/105; DIP- 0/75  Inspection- moderate swelling 3rd, 4th MCP  Strength-  II- 25/100; 3 YULIA- /16; Key- 17/20  Circumference at MCP- R/L- 20 5/19 5 cm; MF- P1- 6 5 cm; RF P1- 6 2 cm  Palpation- tenderness at MF/RF MCP  Pt wearing compression glove, dacia tapes  Also instructed in kenesio taping for dorsal swelling    Assessment: Tolerated treatment well  Patient would benefit from continued PT      Plan: Continue per plan of care        Continue with current program   Precautions: avoid heavy grasp until cleared in therapy      Manuals          STM 15 15 15 15         Dacia tapes   2          Kenmesio tape    MAG                      Neuro Re-Ed             HP/pulsed biph 15 15 15 15                                                                                       Ther Ex             TGE 5x 5x 5x 5x         TP     T 2'         TP 5 finger    T 2'         Gaudencio    20 2/10         Blue    IV 2/10         Flexbar    R 20/20         DB E/F    4 2/10              S/P    1 2/10                                                                                       Modalities             US 15 15 15 15         CP 15 15 15 15

## 2022-04-19 ENCOUNTER — APPOINTMENT (OUTPATIENT)
Dept: PHYSICAL THERAPY | Facility: CLINIC | Age: 37
End: 2022-04-19
Payer: COMMERCIAL

## 2022-04-20 ENCOUNTER — OFFICE VISIT (OUTPATIENT)
Dept: PHYSICAL THERAPY | Facility: CLINIC | Age: 37
End: 2022-04-20
Payer: COMMERCIAL

## 2022-04-20 DIAGNOSIS — S62.392D CLOSED NONDISPLACED FRACTURE OF OTHER PART OF THIRD METACARPAL BONE OF RIGHT HAND WITH ROUTINE HEALING, SUBSEQUENT ENCOUNTER: Primary | ICD-10-CM

## 2022-04-20 PROCEDURE — 97112 NEUROMUSCULAR REEDUCATION: CPT | Performed by: PHYSICAL THERAPIST

## 2022-04-20 PROCEDURE — 97140 MANUAL THERAPY 1/> REGIONS: CPT | Performed by: PHYSICAL THERAPIST

## 2022-04-20 PROCEDURE — 97035 APP MDLTY 1+ULTRASOUND EA 15: CPT | Performed by: PHYSICAL THERAPIST

## 2022-04-20 PROCEDURE — 97110 THERAPEUTIC EXERCISES: CPT | Performed by: PHYSICAL THERAPIST

## 2022-04-20 NOTE — PROGRESS NOTES
Daily Note     Today's date: 2022  Patient name: Fany Jorgensen  : 1985  MRN: [id number]  Referring provider: Rosetta White, PT  Dx:   Encounter Diagnosis     ICD-10-CM    1  Closed nondisplaced fracture of other part of third metacarpal bone of right hand with routine healing, subsequent encounter  E52 265D                   Subjective: pt feels therapy is helping, but he remains sore in the hand when using for heavier activity      Objective: See treatment diary below    PT provided pt with dacia tapes to assist in maintaining radial alignment at the MCP during flexion     AROM right wrist E/F- 60/75                      MF MP- 5/75; PIP- 0/105; DIP- 0/75                      RF MP- 0/75; PIP- 0/105; DIP- 0/75  Inspection- moderate swelling 3rd, 4th MCP  Strength-  II- 25/100; 3 YULIA- ; Key- 20  Circumference at MCP- R/L- 20  5 cm; MF- P1- 6 5 cm; RF P1- 6 2 cm  Palpation- tenderness at MF/RF MCP  Pt wearing compression glove, dacia tapes  Also instructed in kenesio taping for dorsal swelling    Assessment: Tolerated treatment well  Patient would benefit from continued PT      Plan: Continue per plan of care        Continue with current program   Precautions: avoid heavy grasp until cleared in therapy      Manuals         STM 15 15 15 15 15        Dacia tapes   2          Kenesio tape    MAG MAG                     Neuro Re-Ed             HP/pulsed biph 15 15 15 15 15                                                                                      Ther Ex             TGE 5x 5x 5x 5x 5x        TP     T 2' T 2'        TP 5 finger    T 2' T 2'        Gaudencio    20 2/10 20 2/10        Blue    IV 2/10 IV 2/10        Flexbar    R /20 R 2/10        DB E/F    4 2/10 4 2/10             S/P    1 2/10 1 2/10                                                                                      Modalities             US 15 15 15 15 15        CP 15 15 15 15 15

## 2022-04-21 ENCOUNTER — OFFICE VISIT (OUTPATIENT)
Dept: PHYSICAL THERAPY | Facility: CLINIC | Age: 37
End: 2022-04-21
Payer: COMMERCIAL

## 2022-04-21 DIAGNOSIS — S62.392D CLOSED NONDISPLACED FRACTURE OF OTHER PART OF THIRD METACARPAL BONE OF RIGHT HAND WITH ROUTINE HEALING, SUBSEQUENT ENCOUNTER: Primary | ICD-10-CM

## 2022-04-21 PROCEDURE — 97110 THERAPEUTIC EXERCISES: CPT | Performed by: PHYSICAL THERAPIST

## 2022-04-21 PROCEDURE — 97035 APP MDLTY 1+ULTRASOUND EA 15: CPT | Performed by: PHYSICAL THERAPIST

## 2022-04-21 PROCEDURE — 97112 NEUROMUSCULAR REEDUCATION: CPT | Performed by: PHYSICAL THERAPIST

## 2022-04-21 PROCEDURE — 97140 MANUAL THERAPY 1/> REGIONS: CPT | Performed by: PHYSICAL THERAPIST

## 2022-04-21 NOTE — PROGRESS NOTES
Daily Note     Today's date: 2022  Patient name: Nargis Zhang  : 1985  MRN: [id number]  Referring provider: Sahara Darnell, PT  Dx:   Encounter Diagnosis     ICD-10-CM    1  Closed nondisplaced fracture of other part of third metacarpal bone of right hand with routine healing, subsequent encounter  S69 627D                   Subjective: pt feels he is making progress but still has soreness in his dorsal hand and 2,3,4, web spaces  Occasional clicking also occurs  Objective: See treatment diary below    PT provided pt with dacia tapes to assist in maintaining radial alignment at the MCP during flexion     AROM right wrist E/F- 60/75                      MF MP- 5/75; PIP- 0/105; DIP- 0/75                      RF MP- 0/75; PIP- 0/105; DIP- 0/75  Inspection- moderate swelling 3rd, 4th MCP  Strength-  II- 25/100; 3 YULIA- ; Key- /20  Circumference at MCP- R/L- 20  5 cm; MF- P1- 6 5 cm; RF P1- 6 2 cm  Palpation- tenderness at MF/RF MCP  Pt wearing compression glove, dacia tapes  Also instructed in kenesio taping for dorsal swelling     Assessment: Tolerated treatment well  Patient would benefit from continued PT      Plan: Continue per plan of care        Continue with current program   Precautions: avoid heavy grasp until cleared in therapy      Manuals        STM 15 15 15 15 15 15       Dacia tapes   2          Kenesio tape    MAG MAG mag                    Neuro Re-Ed             HP/pulsed biph 15 15 15 15 15 15                                                                                     Ther Ex             TGE 5x 5x 5x 5x 5x 5x       rubberband abd      2/5       TP     T 2' T 2' T 2'       TP 5 finger    T 2' T 2' T 2'       Gaudencio    20 2/10 20 2/10 20 2/10       Blue    IV 2/10 IV 2/10 IV 2/10       Flexbar    R / R 2/10 R 2/10       DB E/F    4 2/10 4 2/10 4 2/10            S/P    1 2/10 1 2/10 1 2/10 Modalities             US 15 15 15 15 15 15       CP 15 15 15 15 15 15

## 2022-04-26 ENCOUNTER — OFFICE VISIT (OUTPATIENT)
Dept: PHYSICAL THERAPY | Facility: CLINIC | Age: 37
End: 2022-04-26
Payer: COMMERCIAL

## 2022-04-26 DIAGNOSIS — S62.392D CLOSED NONDISPLACED FRACTURE OF OTHER PART OF THIRD METACARPAL BONE OF RIGHT HAND WITH ROUTINE HEALING, SUBSEQUENT ENCOUNTER: Primary | ICD-10-CM

## 2022-04-26 PROCEDURE — 97035 APP MDLTY 1+ULTRASOUND EA 15: CPT

## 2022-04-26 PROCEDURE — 97140 MANUAL THERAPY 1/> REGIONS: CPT

## 2022-04-26 PROCEDURE — 97112 NEUROMUSCULAR REEDUCATION: CPT

## 2022-04-26 PROCEDURE — 97110 THERAPEUTIC EXERCISES: CPT

## 2022-04-26 NOTE — PROGRESS NOTES
Daily Note     Today's date: 2022  Patient name: Mauro Handy  : 1985  MRN: [id number]  Referring provider: Areli Pelletier, PT  Dx:   Encounter Diagnosis     ICD-10-CM    1  Closed nondisplaced fracture of other part of third metacarpal bone of right hand with routine healing, subsequent encounter  S63 851D        Subjective: Patient reports he still has soreness in R third MCP area but " a little better "      Objective: See treatment diary below  Incorporated yellow putty into tan today  AROM right wrist E/F- 60/75                      MF MP- 5/75; PIP- 0/105; DIP- 0/75                      RF MP- 0/75; PIP- 0/105; DIP- 0/75  Inspection- moderate swelling 3rd, 4th MCP  Strength-  II- 25/100; 3 YULIA- ; Key-   Circumference at MCP- R/L-  5 cm; MF- P1- 6 5 cm; RF P1- 6 2 cm  Palpation- tenderness at MF/RF MCP      Assessment: Tolerated treatment well  Patient demonstrated fatigue post treatment, exhibited good technique with therapeutic exercises and would benefit from continued PT  No issues with TE today  Plan: Continue per plan of care        Continue with current program   Precautions: avoid heavy grasp until cleared in therapy      Manuals       STM 15 15 15 15 15 15 15      Vishnu tapes   2          Kenesio tape    MAG MAG mag TS                   Neuro Re-Ed             HP/pulsed biph 15 15 15 15 15 15 15                                                                                    Ther Ex             TGE 5x 5x 5x 5x 5x 5x 5x      rubberband abd      2/5 2/5      TP     T 2' T 2' T 2' T/Y 2'      TP 5 finger    T 2' T 2' T 2' T/Y 2'      Gaudencio    20 2/10 20 2/10 20 2/10 20 2/10      Blue    IV 2/10 IV 2/10 IV 2/10 IV 2/10      Flexbar    R  R 2/10 R 2/10 R 2/10      DB E/F    4 2/10 4 2/10 4 2/10 4 2/10           S/P    1 2/10 1 2/10 1 2/10 1 2x10 Modalities             US 15 15 15 15 15 15 15      CP 15 15 15 15 15 15 15

## 2022-04-27 ENCOUNTER — APPOINTMENT (OUTPATIENT)
Dept: PHYSICAL THERAPY | Facility: CLINIC | Age: 37
End: 2022-04-27
Payer: COMMERCIAL

## 2022-04-28 ENCOUNTER — APPOINTMENT (OUTPATIENT)
Dept: PHYSICAL THERAPY | Facility: CLINIC | Age: 37
End: 2022-04-28
Payer: COMMERCIAL

## 2022-05-03 ENCOUNTER — OFFICE VISIT (OUTPATIENT)
Dept: PHYSICAL THERAPY | Facility: CLINIC | Age: 37
End: 2022-05-03
Payer: COMMERCIAL

## 2022-05-03 ENCOUNTER — TELEPHONE (OUTPATIENT)
Dept: OBGYN CLINIC | Facility: HOSPITAL | Age: 37
End: 2022-05-03

## 2022-05-03 DIAGNOSIS — S62.392D CLOSED NONDISPLACED FRACTURE OF OTHER PART OF THIRD METACARPAL BONE OF RIGHT HAND WITH ROUTINE HEALING, SUBSEQUENT ENCOUNTER: Primary | ICD-10-CM

## 2022-05-03 PROCEDURE — 97112 NEUROMUSCULAR REEDUCATION: CPT | Performed by: PHYSICAL THERAPIST

## 2022-05-03 PROCEDURE — 97110 THERAPEUTIC EXERCISES: CPT | Performed by: PHYSICAL THERAPIST

## 2022-05-03 PROCEDURE — 97140 MANUAL THERAPY 1/> REGIONS: CPT | Performed by: PHYSICAL THERAPIST

## 2022-05-03 PROCEDURE — 97035 APP MDLTY 1+ULTRASOUND EA 15: CPT | Performed by: PHYSICAL THERAPIST

## 2022-05-03 NOTE — PROGRESS NOTES
PT Re-Evaluation     Today's date: 5/3/2022  Patient name: Paul Domínguez  : 1985  MRN: [id number]  Referring provider: Aide Oneill PT  Dx:   Encounter Diagnosis     ICD-10-CM    1  Closed nondisplaced fracture of other part of third metacarpal bone of right hand with routine healing, subsequent encounter  [id number]                   Assessment  Assessment details: Pt is a 38 YO male presenting to PT with pain, decreased AROM, strength and tolerance to activity  Pt would benefit from skilled intervention to address these issues and maximize overall function  Occupation- not working  Dominant- right; Involved- right  Pt notes improvement with AROM and strength, but notes limitations in function, especially with tight and heavy grasp  Goals  ST  Decrease pain to 0-2/10 in 4 weeks            2  Decrease swelling right hand in 6-8 weeks            3  Increase AROM to [facility] in 6-8 weeks            4   Provide orthotic for protection, compression for support  LT  Increase functional motion and strength for independence with ADL and self care by DC            2  Ability to RTW and recreational activity by DC    Plan  Patient would benefit from: skilled physical therapy  Planned modality interventions: thermotherapy: hydrocollator packs, ultrasound and cryotherapy  Planned therapy interventions: activity modification, neuromuscular re-education, strengthening, stretching, therapeutic activities, therapeutic exercise and home exercise program  Frequency: 2x week  Duration in weeks: 4  Treatment plan discussed with: patient        Subjective Evaluation    History of Present Illness  Date of onset: 2022  Mechanism of injury: Pt tripped on a broken step, injuring his ankle  Pt became angry and punched a wall, fracturing his right 3rd MC  Pt was diagnosed with a closed non-displaced fracture of his right 2rd MC and was casted for 5 weeks until 3/18/2022      Pain  Current pain ratin  At best pain ratin  At worst pain ratin  Location: right RF> MF MCP  Quality: sharp and knife-like    Hand dominance: right    Treatments  Current treatment: physical therapy  Patient Goals  Patient goals for therapy: decreased edema, decreased pain, increased motion, increased strength, independence with ADLs/IADLs, return to sport/leisure activities and return to work          Objective     General Comments:      Wrist/Hand Comments  AROM right wrist E/F- 60/75                      MF MP- 5/75; PIP- 0/105; DIP- 0/75                      RF MP- 0/75; PIP- 0/105;  DIP- 0/75  Inspection- moderate swelling 3rd, 4th MCP  Strength-  II- 60/120; 3 YULIA- 10/18; Key-   Circumference at MCP- R/L-  5 cm; MF- P1- 6 5 cm; RF P1- 6 2 cm  Palpation- tenderness at MF/RF MCP             Precautions: avoid heavy grasp until cleared in therapy      Manuals   5/3       STM 15 15 15 15 15 15 15  15       Vishnu tapes     2                 Kenesio tape       MAG MAG mag TS  TS                               Neuro Re-Ed                       HP/pulsed biph 15 15 15 15 15 15 15  15                                                       Ther Ex                       TGE 5x 5x 5x 5x 5x 5x 5x  5x       rubberband abd           2/5 2/5  2/5       TP        T 2' T 2' T 2' T/Y 2'  T/Y 3'       TP 5 finger       T 2' T 2' T 2' T/Y 2'  T/Y 3'       Gaudencio       20 2/10 20 2/10 20 2/10 20 2/10  25 2/10       Blue       IV 2/10 IV 2/10 IV 2/10 IV 2/10  V 2/10       Flexbar       R 20/20 R 2/10 R 2/10 R 2/10  R 20/20       DB E/F       4 2/10 4 2/10 4 2/10 4 2/10  4 2/10            S/P       1 2/10 1 2/10 1 2/10 1 2x10  1 2/10                                                                                Comp                --->                                                       Modalities                       US 15 15 15 15 15 15 15  15       CP 15 15 15 15 15 15 15  15

## 2022-05-05 ENCOUNTER — APPOINTMENT (OUTPATIENT)
Dept: PHYSICAL THERAPY | Facility: CLINIC | Age: 37
End: 2022-05-05
Payer: COMMERCIAL

## 2022-05-10 ENCOUNTER — APPOINTMENT (OUTPATIENT)
Dept: PHYSICAL THERAPY | Facility: CLINIC | Age: 37
End: 2022-05-10
Payer: COMMERCIAL

## 2022-05-12 ENCOUNTER — OFFICE VISIT (OUTPATIENT)
Dept: PHYSICAL THERAPY | Facility: CLINIC | Age: 37
End: 2022-05-12
Payer: COMMERCIAL

## 2022-05-12 DIAGNOSIS — S62.392D CLOSED NONDISPLACED FRACTURE OF OTHER PART OF THIRD METACARPAL BONE OF RIGHT HAND WITH ROUTINE HEALING, SUBSEQUENT ENCOUNTER: Primary | ICD-10-CM

## 2022-05-12 PROCEDURE — 97112 NEUROMUSCULAR REEDUCATION: CPT | Performed by: PHYSICAL THERAPIST

## 2022-05-12 PROCEDURE — 97110 THERAPEUTIC EXERCISES: CPT | Performed by: PHYSICAL THERAPIST

## 2022-05-12 PROCEDURE — 97140 MANUAL THERAPY 1/> REGIONS: CPT | Performed by: PHYSICAL THERAPIST

## 2022-05-12 PROCEDURE — 97035 APP MDLTY 1+ULTRASOUND EA 15: CPT | Performed by: PHYSICAL THERAPIST

## 2022-05-12 NOTE — PROGRESS NOTES
On  nasal CPAP    PT Re-Evaluation     Today's date: 2022  Patient name: Paul Summers  : 1985  MRN: [id number]  Referring provider: Hesham Ramos MD  Dx:   Encounter Diagnosis     ICD-10-CM    1  Closed nondisplaced fracture of other part of third metacarpal bone of right hand with routine healing, subsequent encounter  P21 368P                   Assessment  Assessment details: Pt is a 40 YO male presenting to PT with pain, decreased AROM, strength and tolerance to activity  Pt would benefit from skilled intervention to address these issues and maximize overall function  Occupation- not working  Dominant- right; Involved- right  Pt notes improvement with AROM and strength, but notes limitations in function, especially with tight and heavy grasp  Goals  ST  Decrease pain to 0-2/10 in 4 weeks            2  Decrease swelling right hand in 6-8 weeks            3  Increase AROM to [facility] in 6-8 weeks            4   Provide orthotic for protection, compression for support  LT  Increase functional motion and strength for independence with ADL and self care by DC            2  Ability to RTW and recreational activity by DC    Plan  Patient would benefit from: skilled physical therapy  Planned modality interventions: thermotherapy: hydrocollator packs, ultrasound and cryotherapy  Planned therapy interventions: activity modification, neuromuscular re-education, strengthening, stretching, therapeutic activities, therapeutic exercise and home exercise program  Frequency: 2x week  Duration in weeks: 4  Treatment plan discussed with: patient        Subjective Evaluation    History of Present Illness  Date of onset: 2022  Mechanism of injury: Pt tripped on a broken step, injuring his ankle  Pt became angry and punched a wall, fracturing his right 3rd MC  Pt was diagnosed with a closed non-displaced fracture of his right 2rd MC and was casted for 5 weeks until 3/18/2022      Pain  Current pain ratin  At best pain ratin  At worst pain ratin  Location: right RF> MF MCP  Quality: sharp and knife-like    Hand dominance: right    Treatments  Current treatment: physical therapy  Patient Goals  Patient goals for therapy: decreased edema, decreased pain, increased motion, increased strength, independence with ADLs/IADLs, return to sport/leisure activities and return to work          Objective     General Comments:      Wrist/Hand Comments  AROM right wrist E/F- 60/75                      MF MP- 5/75; PIP- 0/105; DIP- 0/75                      RF MP- 0/75; PIP- 0/105;  DIP- 0/75  Inspection- moderate swelling 3rd, 4th MCP  Strength-  II- 60/120; 3 YULIA- 10/18; Key-   Circumference at MCP- R/L-  5 cm; MF- P1- 6 5 cm; RF P1- 6 2 cm  Palpation- tenderness at MF/RF MCP             Precautions: avoid heavy grasp until cleared in therapy      Manuals 4/6 4/8 4/12 4/14 4/20 4/21 4/26  5/3  5/12     STM 15 15 15 15 15 15 15  15  15     Vishnu tapes     2                 Kenesio tape       MAG MAG mag TS  TS TS                             Neuro Re-Ed                       HP/pulsed biph 15 15 15 15 15 15 15  15  15                                                     Ther Ex                       TGE 5x 5x 5x 5x 5x 5x 5x  5x  5x     rubberband abd           2/5 2/5  2/5  2/5     TP        T 2' T 2' T 2' T/Y 2'  T/Y 3'  T/Y 3'     TP 5 finger       T 2' T 2' T 2' T/Y 2'  T/Y 3'  T/Y 3'     Gaudencio       20 2/10 20 2/10 20 2/10 20 2/10  25 2/10  25 2/10     Blue       IV 2/10 IV 2/10 IV 2/10 IV 2/10  V 2/10  V 2/10     Flexbar       R 20/20 R 2/10 R 2/10 R 2/10  R 20/20  R 20/20     DB E/F       4 2/10 4 2/10 4 2/10 4 2/10  4 2/10  4 2/10          S/P       1 2/10 1 2/10 1 2/10 1 2x10  1 2/10  1 2/10                                                                              Comp                ---> Y 3'                                                     Modalities                       US 15 15 15 15 15 15 15  15  15     CP 15 15 15 15 15 15 15  15  15

## 2022-05-12 NOTE — LETTER
May 12, 2022    MD Johnson [address]  [address] [facility] [address] [place] [address]    Patient: Paul Domínguez   YOB: 1985   Date of Visit: 2022     Encounter Diagnosis     ICD-10-CM    1  Closed nondisplaced fracture of other part of third metacarpal bone of right hand with routine healing, subsequent encounter  S62 392D        Dear Dr Bridger Munoz:    Thank you for your recent referral of Paul Domínguez  Please review the attached evaluation summary from Pj's recent visit  Please verify that you agree with the plan of care by signing the attached order  If you have any questions or concerns, please do not hesitate to call  I sincerely appreciate the opportunity to share in the care of one of your patients and hope to have another opportunity to work with you in the near future  Sincerely,    Lucrecia Hall, DPT, CHT    Referring Provider:      I certify that I have read the below Plan of Care and certify the need for these services furnished under this plan of treatment while under my care  MD Johnson [address]  [address] [facility] [place] [address]  Via Fax: [phone]          PT Re-Evaluation     Today's date: 2022  Patient name: Paul Domínguez  : 1985  MRN: [id number]  Referring provider: Meli Franco MD  Dx:   Encounter Diagnosis     ICD-10-CM    1  Closed nondisplaced fracture of other part of third metacarpal bone of right hand with routine healing, subsequent encounter  S62 392D                   Assessment  Assessment details: Pt is a 38 YO male presenting to PT with pain, decreased AROM, strength and tolerance to activity  Pt would benefit from skilled intervention to address these issues and maximize overall function  Occupation- not working  Dominant- right; Involved- right  Pt notes improvement with AROM and strength, but notes limitations in function, especially with tight and heavy grasp  Goals  ST    Decrease pain to 0-2/10 in 4 weeks            2  Decrease swelling right hand in 6-8 weeks            3  Increase AROM to [facility] in 6-8 weeks            4   Provide orthotic for protection, compression for support  LT  Increase functional motion and strength for independence with ADL and self care by DC            2  Ability to RTW and recreational activity by DC    Plan  Patient would benefit from: skilled physical therapy  Planned modality interventions: thermotherapy: hydrocollator packs, ultrasound and cryotherapy  Planned therapy interventions: activity modification, neuromuscular re-education, strengthening, stretching, therapeutic activities, therapeutic exercise and home exercise program  Frequency: 2x week  Duration in weeks: 4  Treatment plan discussed with: patient        Subjective Evaluation    History of Present Illness  Date of onset: 2022  Mechanism of injury: Pt tripped on a broken step, injuring his ankle  Pt became angry and punched a wall, fracturing his right 3rd MC  Pt was diagnosed with a closed non-displaced fracture of his right 2rd MC and was casted for 5 weeks until 3/18/2022  Pain  Current pain ratin  At best pain ratin  At worst pain ratin  Location: right RF> MF MCP  Quality: sharp and knife-like    Hand dominance: right    Treatments  Current treatment: physical therapy  Patient Goals  Patient goals for therapy: decreased edema, decreased pain, increased motion, increased strength, independence with ADLs/IADLs, return to sport/leisure activities and return to work          Objective     General Comments:      Wrist/Hand Comments  AROM right wrist E/F- 60/75                      MF MP- 5/75; PIP- 0/105; DIP- 0/75                      RF MP- 0/75; PIP- 0/105;  DIP- 0/75  Inspection- moderate swelling 3rd, 4th MCP  Strength-  II- 60/120; 3 YULIA- 10/18; Key-   Circumference at MCP- R/L- 20  5 cm; MF- P1- 6 5 cm; RF P1- 6 2 cm  Palpation- tenderness at MF/RF MCP Precautions: avoid heavy grasp until cleared in therapy      Manuals 4/6 4/8 4/12 4/14 4/20 4/21 4/26 5/3  5/12     STM 15 15 15 15 15 15 15  15  15     Vishnu tapes     2                 Kenesio tape       MAG MAG mag TS  TS TS                             Neuro Re-Ed                       HP/pulsed biph 15 15 15 15 15 15 15  15  15                                                     Ther Ex                       TGE 5x 5x 5x 5x 5x 5x 5x  5x  5x     rubberband abd           2/5 2/5  2/5  2/5     TP        T 2' T 2' T 2' T/Y 2'  T/Y 3'  T/Y 3'     TP 5 finger       T 2' T 2' T 2' T/Y 2'  T/Y 3'  T/Y 3'     Gaudencio       20 2/10 20 2/10 20 2/10 20 2/10  25 2/10  25 2/10     Blue       IV 2/10 IV 2/10 IV 2/10 IV 2/10  V 2/10  V 2/10     Flexbar       R 20/20 R 2/10 R 2/10 R 2/10  R 20/20  R 20/20     DB E/F       4 2/10 4 2/10 4 2/10 4 2/10  4 2/10  4 2/10          S/P       1 2/10 1 2/10 1 2/10 1 2x10  1 2/10  1 2/10                                                                              Comp                ---> Y 3'                                                     Modalities                       US 15 15 15 15 15 15 15  15  15     CP 15 15 15 15 15 15 15  15  15

## 2022-05-17 ENCOUNTER — OFFICE VISIT (OUTPATIENT)
Dept: PHYSICAL THERAPY | Facility: CLINIC | Age: 37
End: 2022-05-17
Payer: COMMERCIAL

## 2022-05-17 DIAGNOSIS — S62.392D CLOSED NONDISPLACED FRACTURE OF OTHER PART OF THIRD METACARPAL BONE OF RIGHT HAND WITH ROUTINE HEALING, SUBSEQUENT ENCOUNTER: Primary | ICD-10-CM

## 2022-05-17 PROCEDURE — 97112 NEUROMUSCULAR REEDUCATION: CPT

## 2022-05-17 PROCEDURE — 97035 APP MDLTY 1+ULTRASOUND EA 15: CPT

## 2022-05-17 PROCEDURE — 97110 THERAPEUTIC EXERCISES: CPT

## 2022-05-17 PROCEDURE — 97140 MANUAL THERAPY 1/> REGIONS: CPT

## 2022-05-17 NOTE — PROGRESS NOTES
Daily Note     Today's date: 2022  Patient name: Nancie Naylor  : 1985  MRN: [id number]  Referring provider: Jarred Nuñez PT  Dx:   Encounter Diagnosis     ICD-10-CM    1  Closed nondisplaced fracture of other part of third metacarpal bone of right hand with routine healing, subsequent encounter  P09 603L                   Subjective: Pt  feels some clicking in between LF and IF dorsal MCP       Objective: See treatment diary below  Wrist/Hand Comments  AROM right wrist E/F- 60/75                      MF MP- 5/75; PIP- 0/105; DIP- 0/75                      RF MP- 0/75; PIP- 0/105; DIP- 0/75  Inspection- moderate swelling 3rd, 4th MCP  Strength-  II- 60/120; 3 YULIA- 10/18; Key-   Circumference at MCP- R/L-  5 cm; MF- P1- 6 5 cm; RF P1- 6 2 cm  Palpation- tenderness at MF/RF MCP      Assessment: Tolerated treatment well today  Pt able to progress without complaints  Pt denies exacerbation of symptoms during session  Plan: Progress treatment as tolerated         Precautions: avoid heavy grasp until cleared in therapy      Manuals 4/6 4/8 4/12 4/14 4/20 4/21 4/26  5/3  5/12  5/17   STM 15 15 15 15 15 15 15  15  15  15   Vishnu tapes     2                 Kenesio tape       MAG MAG mag TS  TS TS  TS                           Neuro Re-Ed                       HP/pulsed biph 15 15 15 15 15 15 15  15  15  15                                                   Ther Ex                       TGE 5x 5x 5x 5x 5x 5x 5x  5x  5x  5x   rubberband abd           2/5 2/5  2/5  2/5  25   TP        T 2' T 2' T 2' T/Y 2'  T/Y 3'  T/Y 3'  T/Y 3'   TP 5 finger       T 2' T 2' T 2' T/Y 2'  T/Y 3'  T/Y 3'  T/Y 3'   Gaudencio       20 2/10 20 2/10 20 2/10 20 2/10  25 2/10  25 2/10  25 2/10   Blue       IV 2/10 IV 2/10 IV 2/10 IV 2/10  V 2/10  V 2/10  V 2/10   Flexbar       R 20/20 R 2/10 R 2/10 R 2/10  R 20/20  R 20/20  R 2/10   DB E/F       4 2/10 4 2/10 4 2/10 4 2/10  4 2/10  4 2/10  5 /10        S/P       1 2/10 1 2/10 1 2/10 1 2x10  1 2/10  1 2/10  1 5 2/10                                                                            Comp                ---> Y 3'  Y 3'                                                   Modalities                       US 15 15 15 15 15 15 15  15  15  15   CP 15 15 15 15 15 15 15  15  15  15

## 2022-05-19 ENCOUNTER — OFFICE VISIT (OUTPATIENT)
Dept: PHYSICAL THERAPY | Facility: CLINIC | Age: 37
End: 2022-05-19
Payer: COMMERCIAL

## 2022-05-19 DIAGNOSIS — S62.392D CLOSED NONDISPLACED FRACTURE OF OTHER PART OF THIRD METACARPAL BONE OF RIGHT HAND WITH ROUTINE HEALING, SUBSEQUENT ENCOUNTER: Primary | ICD-10-CM

## 2022-05-19 PROCEDURE — 97140 MANUAL THERAPY 1/> REGIONS: CPT | Performed by: PHYSICAL THERAPIST

## 2022-05-19 PROCEDURE — 97110 THERAPEUTIC EXERCISES: CPT | Performed by: PHYSICAL THERAPIST

## 2022-05-19 PROCEDURE — 97112 NEUROMUSCULAR REEDUCATION: CPT | Performed by: PHYSICAL THERAPIST

## 2022-05-19 PROCEDURE — 97035 APP MDLTY 1+ULTRASOUND EA 15: CPT | Performed by: PHYSICAL THERAPIST

## 2022-05-19 NOTE — PROGRESS NOTES
Daily Note     Today's date: 2022  Patient name: Rene Cook  : 1985  MRN: [id number]  Referring provider: Ghislaine Cheema, PT  Dx:   Encounter Diagnosis     ICD-10-CM    1  Closed nondisplaced fracture of other part of third metacarpal bone of right hand with routine healing, subsequent encounter  N72 724P                   Subjective: pt notes decreased hand strength with grasp  Objective: See treatment diary below    AROM right wrist E/F- 60/75                      MF MP- 5/75; PIP- 0/105; DIP- 0/75                      RF MP- 0/75; PIP- 0/105; DIP- 0/75  Inspection- moderate swelling 3rd, 4th MCP  Strength-  II- 60/120; 3 YULIA- 10/18; Key-   Circumference at MCP- R/L- 20  5 cm; MF- P1- 6 5 cm; RF P1- 6 2 cm  Palpation- tenderness at MF/RF MCP     Assessment: Tolerated treatment well  Patient would benefit from continued PT      Plan: Continue per plan of care        Precautions: avoid heavy grasp until cleared in therapy      Manuals             STM 15 15 15 15 15 15 15  15  15  15   Vishnu tapes     2                 Kenesio tape       MAG MAG mag TS  TS TS  TS                           Neuro Re-Ed                       HP/pulsed biph 15 15 15 15 15 15 15  15  15  15                                                   Ther Ex                       TGE 5x 5x 5x 5x 5x 5x 5x  5x  5x  5x   rubberband abd  2/5 2/10         2/5 2/5  2/5  2/5  25   TP   T/Y 3'     T 2' T 2' T 2' T/Y 2'  T/Y 3'  T/Y 3'  T/Y 3'   TP 5 finger  T/Y 3'     T 2' T 2' T 2' T/Y 2'  T/Y 3'  T/Y 3'  T/Y 3'   Gaudencio  25 2/10     20 2/10 20 2/10 20 2/10 20 2/10  25 2/10  25 2/10  25 2/10   Blue  V 2/10     IV 2/10 IV 2/10 IV 2/10 IV 2/10  V 2/10  V 2/10  V 2/10   Flexbar  R 2/10     R 20/20 R 2/10 R 2/10 R 2/10  R 20/20  R 20/20  R 2/10   DB E/F  5 2/10     4 2/10 4 2/10 4 2/10 4 2/10  4 2/10  4 2/10  5 2/10        S/P  1 5 2/10     1 2/10 1 2/10 1 2/10 1 2x10  1 2/10  1 2/10  1 5 2/10   SA row P3 1/10 Upright row P3 2/10            Mid Row-2 handles 4 2/10                                                                                                  Comp  Y 3'              ---> Y 3'  Y 3'                                                   Modalities                       US 15 15 15 15 15 15 15  15  15  15   CP def 15 15 15 15 15 15  15  15  15

## 2022-05-24 ENCOUNTER — OFFICE VISIT (OUTPATIENT)
Dept: PHYSICAL THERAPY | Facility: CLINIC | Age: 37
End: 2022-05-24
Payer: COMMERCIAL

## 2022-05-24 DIAGNOSIS — S62.392D CLOSED NONDISPLACED FRACTURE OF OTHER PART OF THIRD METACARPAL BONE OF RIGHT HAND WITH ROUTINE HEALING, SUBSEQUENT ENCOUNTER: Primary | ICD-10-CM

## 2022-05-24 PROCEDURE — 97110 THERAPEUTIC EXERCISES: CPT

## 2022-05-24 PROCEDURE — 97140 MANUAL THERAPY 1/> REGIONS: CPT

## 2022-05-24 PROCEDURE — 97112 NEUROMUSCULAR REEDUCATION: CPT

## 2022-05-24 NOTE — PROGRESS NOTES
Daily Note     Today's date: 2022  Patient name: Fany Jorgensen  : 1985  MRN: [id number]  Referring provider: Rosetta White, PT  Dx:   Encounter Diagnosis     ICD-10-CM    1  Closed nondisplaced fracture of other part of third metacarpal bone of right hand with routine healing, subsequent encounter  H92 918D                   Subjective: Pt reports his hand/digit is feeling better  "My back impedes me from doing certain activities"      Objective: See treatment diary below    Objective: See treatment diary below    AROM right wrist E/F- 60/75                      MF MP- 5/75; PIP- 0/105; DIP- 0/75                      RF MP- 0/75; PIP- 0/105; DIP- 0/75  Inspection- moderate swelling 3rd, 4th MCP  Strength-  II- 60/120; 3 YULIA- 10/18; Key-   Circumference at MCP- R/L-  5 cm; MF- P1- 6 5 cm; RF P1- 6 2 cm  Palpation- tenderness at MF/RF MCP    Assessment: Tolerated treatment well today  Pt restrained from particular standing activities due to back irritation  Program modified for comfort  Plan: Progress treatment as tolerated         Precautions: avoid heavy grasp until cleared in therapy      Manuals            STM 15 15 15 15 15 15 15  15  15  15   Vishnu tapes     2                 Kenesio tape       MAG MAG mag TS  TS TS  TS                           Neuro Re-Ed                       HP/pulsed biph 15 15 15 15 15 15 15  15  15  15                                                   Ther Ex                       TGE 5x 5x 5x 5x 5x 5x 5x  5x  5x  5x   rubberband abd  2/5 2/10  25       25 5  25   TP   T/Y 3'  T/Y 3'   T 2' T 2' T 2' T/Y 2'  T/Y 3'  T/Y 3'  T/Y 3'   TP 5 finger  T/Y 3'  T/Y 3'   T 2' T 2' T 2' T/Y 2'  T/Y 3'  T/Y 3'  T/Y 3'   Gaudencio  25 2/10  35 2/10   20 2/10 20 2/10 20 2/10 20 2/10  25 2/10  25 2/10  25 2/10   Blue  V 2/10  V 2/10   IV 2/10 IV 2/10 IV 2/10 IV 2/10  V 2/10  V 2/10  V 2/10   Flexbar  R 2/10  G 2/10   R  R 2/10 R 2/10 R 2/10  R 20/20  R 20/20  R 2/10   DB E/F  5 2/10  5 2/10   4 2/10 4 2/10 4 2/10 4 2/10  4 2/10  4 2/10  5 2/10        S/P  1 5 2/10  2 2/10   1 2/10 1 2/10 1 2/10 1 2x10  1 2/10  1 2/10  1 5 2/10   SA row P3 1/10 See mid row           Upright row P3 2/10 P3 2/10           Mid Row-2 handles 4 2/10 4 2/10           Mid Row Pro/Sup  4 1x10 ea                                                                                    Comp  Y 3'  Y3'            ---> Y 3'  Y 3'                                                   Modalities                       US 15 15 15 15 15 15 15  15  15  15   CP def DEF 15 15 15 15 15  15  15  15

## 2022-05-26 ENCOUNTER — APPOINTMENT (OUTPATIENT)
Dept: PHYSICAL THERAPY | Facility: CLINIC | Age: 37
End: 2022-05-26
Payer: COMMERCIAL

## 2022-05-31 ENCOUNTER — APPOINTMENT (OUTPATIENT)
Dept: PHYSICAL THERAPY | Facility: CLINIC | Age: 37
End: 2022-05-31
Payer: COMMERCIAL

## 2022-06-02 ENCOUNTER — OFFICE VISIT (OUTPATIENT)
Dept: PHYSICAL THERAPY | Facility: CLINIC | Age: 37
End: 2022-06-02
Payer: COMMERCIAL

## 2022-06-02 DIAGNOSIS — S62.392D CLOSED NONDISPLACED FRACTURE OF OTHER PART OF THIRD METACARPAL BONE OF RIGHT HAND WITH ROUTINE HEALING, SUBSEQUENT ENCOUNTER: Primary | ICD-10-CM

## 2022-06-02 PROCEDURE — 97112 NEUROMUSCULAR REEDUCATION: CPT

## 2022-06-02 PROCEDURE — 97140 MANUAL THERAPY 1/> REGIONS: CPT

## 2022-06-02 PROCEDURE — 97110 THERAPEUTIC EXERCISES: CPT

## 2022-06-02 PROCEDURE — 97035 APP MDLTY 1+ULTRASOUND EA 15: CPT

## 2022-06-02 NOTE — PROGRESS NOTES
Daily Note     Today's date: 2022  Patient name: Rene Cook  : 1985  MRN: [id number]  Referring provider: Ghislaine Cheema PT  Dx:   Encounter Diagnosis     ICD-10-CM    1  Closed nondisplaced fracture of other part of third metacarpal bone of right hand with routine healing, subsequent encounter  Z37 048D                   Subjective: Pt reports his hand is getting stronger " "I have been pushing it more lately"      Objective: See treatment diary below  Objective: See treatment diary below    AROM right wrist E/F- 60/75                      MF MP- 5/75; PIP- 0/105; DIP- 0/75                      RF MP- 0/75; PIP- 0/105; DIP- 0/75  Inspection- moderate swelling 3rd, 4th MCP  Strength-  II- 60/120; 3 YULIA- 10/18; Key-   Circumference at MCP- R/L-  5 cm; MF- P1- 6 5 cm; RF P1- 6 2 cm  Palpation- tenderness at MF/RF MCP      Assessment: Tolerated treatment well today  Pt responding well to program  Exercises with distraction of MP feels relieving for patient  Plan: Progress treatment as tolerated         Precautions: avoid heavy grasp until cleared in therapy      Manuals  6          STM 15 15 15 15 15 15 15  15  15  15   Vishnu tapes                      Kenesio tape       MAG MAG mag TS  TS TS  TS                           Neuro Re-Ed                       HP/pulsed biph 15 15 15 15 15 15 15  15  15  15                                                   Ther Ex                       TGE 5x 5x 5x 5x 5x 5x 5x  5x  5x  5x   rubberband abd  /5 /10  2/5  2/5     2/5 2/5  2/5  25   TP   T/Y 3'  T/Y 3' Y/R 2' T 2' T 2' T 2' T/Y 2'  T/Y 3'  T/Y 3'  T/Y 3'   TP 5 finger  T/Y 3'  T/Y 3'  Y/R 3' T 2' T 2' T 2' T/Y 2'  T/Y 3'  T/Y 3'  T/Y 3'   Gaudencio  25 2/10  35 2/10  35 2/10 20 2/10 20 2/10 20 2/10 20 2/10  25 2/10  25 2/10  25 2/10   Blue  V 2/10  V 2/10  V 2/10 IV 2/10 IV 2/10 IV 2/10 IV 2/10  V 2/10  V 2/10  V 10   Flexbar  R 2/10  G /10  G 2/10 R  R 2/10 R 2/10 R 2/10  R 20/20  R 20/20  R 2/10   DB E/F  5 2/10  5 2/10  6 2/10 4 2/10 4 2/10 4 2/10 4 2/10  4 2/10  4 2/10  5 2/10        S/P  1 5 2/10  2 2/10  2 2/10 1 2/10 1 2/10 1 2/10 1 2x10  1 2/10  1 2/10  1 5 2/10   SA row P3 1/10 See mid row NP          Upright row P3 2/10 P3 2/10 P3 2/10          Mid Row-2 handles 4 2/10 4 2/10 4 2/10          Mid Row Pro/Sup  4 1x10 ea 4 1x10                                                                                   Comp  Y 3'  Y3'  R 3'          ---> Y 3'  Y 3'                                                   Modalities                       US 15 15 15 15 15 15 15  15  15  15   CP def DEF DEF 15 15 15 15  15  15  15

## 2022-06-02 NOTE — LETTER
2022    Alejo Harrell MD  [address], [address] [facility], [address]    Patient: Isidra Ferguson   YOB: 1985   Date of Visit: 2022     Encounter Diagnosis     ICD-10-CM    1  Closed nondisplaced fracture of other part of third metacarpal bone of right hand with routine healing, subsequent encounter  S62 392D        Dear Dr Tiffany Jenkins:    Thank you for your recent referral of Chaves Esters  Please review the attached evaluation summary from Pj's recent visit  Please verify that you agree with the plan of care by signing the attached order  If you have any questions or concerns, please do not hesitate to call  I sincerely appreciate the opportunity to share in the care of one of your patients and hope to have another opportunity to work with you in the near future  Sincerely,    Amber Maciel, DPT, CHT    Referring Provider:      I certify that I have read the below Plan of Care and certify the need for these services furnished under this plan of treatment while under my care  Alexis Bustamante MD  [facility] [address] [place] [address]  Via Fax: [phone]          Daily Note     Today's date: 2022  Patient name: Isidra Ferguson  : 1985  MRN: [id number]  Referring provider: Morgan Deng PT  Dx:   Encounter Diagnosis     ICD-10-CM    1  Closed nondisplaced fracture of other part of third metacarpal bone of right hand with routine healing, subsequent encounter  S62 392D                   Subjective: Pt reports his hand is getting stronger " "I have been pushing it more lately"      Objective: See treatment diary below  Objective: See treatment diary below    AROM right wrist E/F- 60/75                      MF MP- 5/75; PIP- 0/105; DIP- 0/75                      RF MP- 0/75; PIP- 0/105;  DIP- 0/75  Inspection- moderate swelling 3rd, 4th MCP  Strength-  II- 60/120; 3 YULIA- 10/18; Key- 18  Circumference at MCP- R/L- 20  5 cm; MF- P1- 6 5 cm; RF P1- 6 2 cm  Palpation- tenderness at MF/RF MCP      Assessment: Tolerated treatment well today  Pt responding well to program  Exercises with distraction of MP feels relieving for patient  Plan: Progress treatment as tolerated         Precautions: avoid heavy grasp until cleared in therapy      Manuals  6/          STM 15 15 15 15 15 15 15  15  15  15   Vishnu tapes                      Kenesio tape       MAG MAG mag TS  TS TS  TS                           Neuro Re-Ed                       HP/pulsed biph 15 15 15 15 15 15 15  15  15  15                                                   Ther Ex                       TGE 5x 5x 5x 5x 5x 5x 5x  5x  5x  5x   rubberband abd  2/5 2/10  2/5  2/5     2/5 2/5  2/5  5   TP   T/Y 3'  T/Y 3' Y/R 2' T 2' T 2' T 2' T/Y 2'  T/Y 3'  T/Y 3'  T/Y 3'   TP 5 finger  T/Y 3'  T/Y 3'  Y/R 3' T 2' T 2' T 2' T/Y 2'  T/Y 3'  T/Y 3'  T/Y 3'   Gaudencio  25 2/10  35 2/10  35 2/10 20 2/10 20 2/10 20 2/10 20 2/10  25 2/10  25 2/10  25 2/10   Blue  V 2/10  V 2/10  V 2/10 IV 2/10 IV 2/10 IV 2/10 IV 2/10  V 2/10  V 2/10  V 2/10   Flexbar  R 2/10  G 2/10  G 2/10 R 20/20 R 2/10 R 2/10 R 2/10  R 20/20  R 20/20  R 2/10   DB E/F  5 2/10  5 2/10  6 2/10 4 2/10 4 2/10 4 2/10 4 2/10  4 2/10  4 2/10  5 2/10        S/P  1 5 2/10  2 2/10  2 2/10 1 2/10 1 2/10 1 2/10 1 2x10  1 2/10  1 2/10  1 5 2/10   SA row P3 1/10 See mid row NP          Upright row P3 2/10 P3 2/10 P3 2/10          Mid Row-2 handles 4 2/10 4 2/10 4 2/10          Mid Row Pro/Sup  4 1x10 ea 4 1x10                                                                                   Comp  Y 3'  Y3'  R 3'          ---> Y 3'  Y 3'                                                   Modalities                       US 15 15 15 15 15 15 15  15  15  15   CP def DEF DEF 15 15 15 15  15  15  15                     PT Re-Evaluation     Today's date: 2022  Patient name: Garo Suero  : 1985  MRN: [id number]  Referring provider: Aide Oneill, PT  Dx:   Encounter Diagnosis     ICD-10-CM    1  Closed nondisplaced fracture of other part of third metacarpal bone of right hand with routine healing, subsequent encounter  S62 392D        Start Time: 1100  Stop Time: 1215  Total time in clinic (min): 75 minutes    Assessment  Assessment details: Pt is a 40 YO male presenting to PT with pain, decreased AROM, strength and tolerance to activity  Pt would benefit from skilled intervention to address these issues and maximize overall function  Occupation- not working  Dominant- right; Involved- right  Pt notes improvement with AROM and strength, but notes limitations in function, especially with tight and heavy grasp  He feels his LF,RF and SF MCP remain tight and restrictive  Goals  ST  Decrease pain to 0-2/10 in 4 weeks            2  Decrease swelling right hand in 6-8 weeks            3  Increase AROM to [facility] in 6-8 weeks            4   Provide orthotic for protection, compression for support  LT  Increase functional motion and strength for independence with ADL and self care by DC            2  Ability to RTW and recreational activity by DC    Plan  Patient would benefit from: skilled physical therapy  Planned modality interventions: thermotherapy: hydrocollator packs, ultrasound and cryotherapy  Planned therapy interventions: activity modification, neuromuscular re-education, strengthening, stretching, therapeutic activities, therapeutic exercise and home exercise program  Frequency: 2x week  Duration in weeks: 4  Treatment plan discussed with: patient        Subjective Evaluation    History of Present Illness  Date of onset: 2022  Mechanism of injury: Pt tripped on a broken step, injuring his ankle  Pt became angry and punched a wall, fracturing his right 3rd MC  Pt was diagnosed with a closed non-displaced fracture of his right 2rd MC and was casted for 5 weeks until 3/18/2022  Pain  Current pain ratin  At best pain ratin  At worst pain ratin  Location: right RF> MF MCP  Quality: sharp and knife-like    Hand dominance: right    Treatments  Current treatment: physical therapy  Patient Goals  Patient goals for therapy: decreased edema, decreased pain, increased motion, increased strength, independence with ADLs/IADLs, return to sport/leisure activities and return to work          Objective     General Comments:      Wrist/Hand Comments  AROM right wrist E/F- 60/75                      MF MP- 5/75; PIP- 0/105; DIP- 0/75                      RF MP- 0/75; PIP- 0/105;  DIP- 0/75  Inspection- moderate swelling 3rd, 4th MCP  Strength-  II- 75/140; 3 YULIA- ; Key-   Circumference at MCP- R/L-  5 cm; MF- P1- 6 5 cm; RF P1- 6 2 cm  Palpation- tenderness at MF/RF MCP             Precautions: avoid heavy grasp until cleared in therapy    Manuals                  STM 15 15 15 15 15 15 15  15  15  15   Vishnu tapes                       Kenesio tape       MAG MAG mag TS  TS TS  TS                           Neuro Re-Ed                       HP/pulsed biph 15 15 15 15 15 15 15  15  15  15                                                   Ther Ex                       TGE 5x 5x 5x 5x 5x 5x 5x  5x  5x  5x   rubberband abd  2/5 2/10  2/5  2/5     2/5 2/5  2/5  2/5  5   TP   T/Y 3'  T/Y 3' Y/R 2' T 2' T 2' T 2' T/Y 2'  T/Y 3'  T/Y 3'  T/Y 3'   TP 5 finger  T/Y 3'  T/Y 3'  Y/R 3' T 2' T 2' T 2' T/Y 2'  T/Y 3'  T/Y 3'  T/Y 3'   Gaudencio  25 2/10  35 2/10  35 2/10 20 2/10 20 2/10 20 2/10 20 2/10  25 2/10  25 2/10  25 2/10   Blue  V 2/10  V 2/10  V 2/10 IV 2/10 IV 2/10 IV 2/10 IV 2/10  V 2/10  V 2/10  V 2/10   Flexbar  R 2/10  G 2/10  G 2/10 R 20/20 R 2/10 R 2/10 R 2/10  R 20/20  R 20/20  R 2/10   DB E/F  5 2/10  5 2/10  6 2/10 4 2/10 4 2/10 4 2/10 4 2/10  4 2/10  4 2/10  5 2/10        S/P  1 5 2/10  2 2/10  2 2/10 1 2/10 1 2/10 1 2/10 1 2x10  1 2/10  1 2/10  1 5 2/10 SA row P3 1/10 See mid row NP                 Upright row P3 2/10 P3 2/10 P3 2/10                 Mid Row-2 handles 4 2/10 4 2/10 4 2/10                 Mid Row Pro/Sup   4 1x10 ea 4 1x10                                                                                          Comp  Y 3'  Y3'  R 3'          ---> Y 3'  Y 3'                                                   Modalities                       US 15 15 15 15 15 15 15  15  15  15   CP def DEF DEF 15 15 15 15  15  15  15

## 2022-06-02 NOTE — PROGRESS NOTES
PT Re-Evaluation     Today's date: 2022  Patient name: Scott Sutton  : 1985  MRN: [id number]  Referring provider: Feli Borges PT  Dx:   Encounter Diagnosis     ICD-10-CM    1  Closed nondisplaced fracture of other part of third metacarpal bone of right hand with routine healing, subsequent encounter  S62 392D        Start Time: 1100  Stop Time: 1215  Total time in clinic (min): 75 minutes    Assessment  Assessment details: Pt is a 40 YO male presenting to PT with pain, decreased AROM, strength and tolerance to activity  Pt would benefit from skilled intervention to address these issues and maximize overall function  Occupation- not working  Dominant- right; Involved- right  Pt notes improvement with AROM and strength, but notes limitations in function, especially with tight and heavy grasp  He feels his LF,RF and SF MCP remain tight and restrictive  Goals  ST  Decrease pain to 0-2/10 in 4 weeks            2  Decrease swelling right hand in 6-8 weeks            3  Increase AROM to [facility] in 6-8 weeks            4   Provide orthotic for protection, compression for support  LT  Increase functional motion and strength for independence with ADL and self care by DC            2  Ability to RTW and recreational activity by DC    Plan  Patient would benefit from: skilled physical therapy  Planned modality interventions: thermotherapy: hydrocollator packs, ultrasound and cryotherapy  Planned therapy interventions: activity modification, neuromuscular re-education, strengthening, stretching, therapeutic activities, therapeutic exercise and home exercise program  Frequency: 2x week  Duration in weeks: 4  Treatment plan discussed with: patient        Subjective Evaluation    History of Present Illness  Date of onset: 2022  Mechanism of injury: Pt tripped on a broken step, injuring his ankle  Pt became angry and punched a wall, fracturing his right 3rd MC    Pt was diagnosed with a closed non-displaced fracture of his right 2rd MC and was casted for 5 weeks until 3/18/2022  Pain  Current pain ratin  At best pain ratin  At worst pain ratin  Location: right RF> MF MCP  Quality: sharp and knife-like    Hand dominance: right    Treatments  Current treatment: physical therapy  Patient Goals  Patient goals for therapy: decreased edema, decreased pain, increased motion, increased strength, independence with ADLs/IADLs, return to sport/leisure activities and return to work          Objective     General Comments:      Wrist/Hand Comments  AROM right wrist E/F- 60/75                      MF MP- 5/75; PIP- 0/105; DIP- 0/75                      RF MP- 0/75; PIP- 0/105;  DIP- 0/75  Inspection- moderate swelling 3rd, 4th MCP  Strength-  II- 75/140; 3 YULIA- ; Key-   Circumference at MCP- R/L-  5 cm; MF- P1- 6 5 cm; RF P1- 6 2 cm  Palpation- tenderness at MF/RF MCP             Precautions: avoid heavy grasp until cleared in therapy    Manuals                  STM 15 15 15 15 15 15 15  15  15  15   Vishnu tapes                       Kenesio tape       MAG MAG mag TS  TS TS  TS                           Neuro Re-Ed                       HP/pulsed biph 15 15 15 15 15 15 15  15  15  15                                                   Ther Ex                       TGE 5x 5x 5x 5x 5x 5x 5x  5x  5x  5x   rubberband abd  2/5 2/10  2/5  2/5     2/5 2/5  2/5  2/5   TP   T/Y 3'  T/Y 3' Y/R 2' T 2' T 2' T 2' T/Y 2'  T/Y 3'  T/Y 3'  T/Y 3'   TP 5 finger  T/Y 3'  T/Y 3'  Y/R 3' T 2' T 2' T 2' T/Y 2'  T/Y 3'  T/Y 3'  T/Y 3'   Gaudencio  25 2/10  35 2/10  35 2/10 20 2/10 20 2/10 20 2/10 20 2/10  25 2/10  25 2/10  25 2/10   Blue  V 2/10  V 2/10  V 2/10 IV 2/10 IV 2/10 IV 2/10 IV 2/10  V 2/10  V 2/10  V 2/10   Flexbar  R 2/10  G 2/10  G 2/10 R 2020 R 2/10 R 2/10 R 2/10  R   R   R 2/10   DB E/F  5 2/10  5 2/10  6 2/10 4 2/10 4 2/10 4 2/10 4 2/10  4 2/10  4 2/10  5 2/10      S/P  1 5 2/10  2 2/10  2 2/10 1 2/10 1 2/10 1 2/10 1 2x10  1 2/10  1 2/10  1 5 2/10   SA row P3 1/10 See mid row NP                 Upright row P3 2/10 P3 2/10 P3 2/10                 Mid Row-2 handles 4 2/10 4 2/10 4 2/10                 Mid Row Pro/Sup   4 1x10 ea 4 1x10                                                                                          Comp  Y 3'  Y3'  R 3'          ---> Y 3'  Y 3'                                                   Modalities                       US 15 15 15 15 15 15 15  15  15  15   CP def DEF DEF 15 15 15 15  15  15  15

## 2022-06-07 ENCOUNTER — OFFICE VISIT (OUTPATIENT)
Dept: PHYSICAL THERAPY | Facility: CLINIC | Age: 37
End: 2022-06-07
Payer: COMMERCIAL

## 2022-06-07 DIAGNOSIS — S62.392D CLOSED NONDISPLACED FRACTURE OF OTHER PART OF THIRD METACARPAL BONE OF RIGHT HAND WITH ROUTINE HEALING, SUBSEQUENT ENCOUNTER: Primary | ICD-10-CM

## 2022-06-07 PROCEDURE — 97110 THERAPEUTIC EXERCISES: CPT

## 2022-06-07 PROCEDURE — 97140 MANUAL THERAPY 1/> REGIONS: CPT

## 2022-06-07 NOTE — PROGRESS NOTES
Daily Note     Today's date: 2022  Patient name: Mary Leahy  : 1985  MRN: [id number]  Referring provider: Messi Mg MD  Dx:   Encounter Diagnosis     ICD-10-CM    1  Closed nondisplaced fracture of other part of third metacarpal bone of right hand with routine healing, subsequent encounter  [id number] [id number]                   Subjective: Pt reports improved symptoms and strength in his hand  Pt more focused on trying to use his hand  Objective: See treatment diary below    Wrist/Hand Comments  AROM right wrist E/F- 60/75                      MF MP- 0/95; PIP- 0/100; DIP- 0/80                      RF MP- 0/90; PIP- 0/100; DIP- 0/80                      SF MP- 0/100 PIP- 0/105 DIP-0/75  Inspection- moderate swelling 3rd, 4th MCP  Strength-  II- 80/120; 3 YULIA- 16/20; Kaur- 20/20  Circumference at MCP- R/L- 20  5 cm; MF- P1- 6 5 cm; RF P1- 6 2 cm  Palpation- tenderness at MF/RF MCP        Assessment: Tolerated treatment well today  Pt able to progress and feels relief with distraction type exercises  Plan: Progress treatment as tolerated         Precautions: avoid heavy grasp until cleared in therapy    Manuals                STM 15 15 15 15 15 15 15  15  15  15   Vishnu tapes                       Kenesio tape        MAG mag TS  TS TS  TS                           Neuro Re-Ed                       HP/pulsed biph 15 15 15 15 15 15 15  15  15  15                                                   Ther Ex                       TGE 5x 5x 5x 5x 5x 5x 5x  5x  5x  5x   rubberband abd  2/5 2/10  2/5  2/5 2/5    2/5 2/5  2/5  2/5  2/5   TP   T/Y 3'  T/Y 3' Y/R 2' R 2' T 2' T 2' T/Y 2'  T/Y 3'  T/Y 3'  T/Y 3'   TP 5 finger  T/Y 3'  T/Y 3'  Y/R 3' R 2' T 2' T 2' T/Y 2'  T/Y 3'  T/Y 3'  T/Y 3'   Gaudencio  25 2/10  35 2/10  35 2/10 35 2/10 20 2/10 20 2/10 20 2/10  25 2/10  25 2/10  25 2/10   Blue  V /10  V /10  V /10 V 10 IV /10 IV /10 IV /10  V 10  V 10  V 2/10   Flexbar  R 10  G 2/10  G 2/10 G 20/20 R 2/10 R 2/10 R 2/10  R 20/20  R 20/20  R 2/10   DB E/F  5 2/10  5 2/10  6 2/10 6 2/10 4 2/10 4 2/10 4 2/10  4 2/10  4 2/10  5 2/10        S/P  1 5 2/10  2 2/10  2 2/10 2 2/10 1 2/10 1 2/10 1 2x10  1 2/10  1 2/10  1 5 2/10   SA row P3 1/10 See mid row NP  P3 2/10               Upright row P3 2/10 P3 2/10 P3 2/10  P3 2/10               Mid Row-2 handles 4 2/10 4 2/10 4 2/10  P4 2/10               Mid Row Pro/Sup   4 1x10 ea 4 1x10  P4 1/10                                                                                        Comp  Y 3'  Y3'  R 3'  R3'        ---> Y 3'  Y 3'                                                   Modalities                       US 15 15 15 NP 15 15 15  15  15  15   CP def DEF DEF DEF 15 15 15  15  15  15

## 2022-06-09 ENCOUNTER — APPOINTMENT (OUTPATIENT)
Dept: PHYSICAL THERAPY | Facility: CLINIC | Age: 37
End: 2022-06-09
Payer: COMMERCIAL

## 2022-06-14 ENCOUNTER — OFFICE VISIT (OUTPATIENT)
Dept: PHYSICAL THERAPY | Facility: CLINIC | Age: 37
End: 2022-06-14
Payer: COMMERCIAL

## 2022-06-14 DIAGNOSIS — S62.392D CLOSED NONDISPLACED FRACTURE OF OTHER PART OF THIRD METACARPAL BONE OF RIGHT HAND WITH ROUTINE HEALING, SUBSEQUENT ENCOUNTER: Primary | ICD-10-CM

## 2022-06-14 PROCEDURE — 97112 NEUROMUSCULAR REEDUCATION: CPT

## 2022-06-14 PROCEDURE — 97110 THERAPEUTIC EXERCISES: CPT

## 2022-06-14 NOTE — PROGRESS NOTES
Daily Note     Today's date: 2022  Patient name: Aurelia Johnson  : 1985  MRN: [id number]  Referring provider: Laura Arango MD  Dx:   Encounter Diagnosis     ICD-10-CM    1  Closed nondisplaced fracture of other part of third metacarpal bone of right hand with routine healing, subsequent encounter  O98 [id number]                   Subjective: Pt reports his hand "is pretty good today  "Feeling better and stronger "      Objective: See treatment diary below    Objective: See treatment diary below    Wrist/Hand Comments  AROM right wrist E/F- 60/75                      MF MP- 0/95; PIP- 0/100; DIP- 0/80                      RF MP- 0/90; PIP- 0/100; DIP- 0/80                      SF MP- 0/100 PIP- 0/105 DIP-0/75  Inspection- moderate swelling 3rd, 4th MCP  Strength-  II- 80/120; 3 YULIA- 16/20; Kaur- 20/20  Circumference at MCP- R/L- 20  5 cm; MF- P1- 6 5 cm; RF P1- 6 2 cm  Palpation- tenderness at MF/RF MCP    Assessment: Tolerated treatment well today  Continues to increase resistance with less complaints  Plan: Progress treatment as tolerated         Precautions: avoid heavy grasp until cleared in therapy    Manuals              STM 15 15 15 15 DEF 15 15  15  15  15   Vishnu tapes                       Kenesio tape         mag TS  TS TS  TS                           Neuro Re-Ed                       HP/pulsed biph 15 15 15 15 15 15 15  15  15  15                                                   Ther Ex                       TGE 5x 5x 5x 5x 5x 5x 5x  5x  5x  5x   rubberband abd  2/5 2/10  2/5  2/5 2/5    2/5 2/5  2/5  2/5  2/5   TP   T/Y 3'  T/Y 3' Y/R 2' R 2' R 2' T 2' T/Y 2'  T/Y 3'  T/Y 3'  T/Y 3'   TP 5 finger  T/Y 3'  T/Y 3'  Y/R 3' R 2' R 2' T 2' T/Y 2'  T/Y 3'  T/Y 3'  T/Y 3'   Gaudencio  25 2/10  35 2/10  35 2/10 35 2/10 50 2/10 20 2/10 20 2/10  25 2/10  25 2/10  25 2/10   Blue  V 10  V 2/10  V 2/10 V 2/10 VI 2/10 IV 2/10 IV 2/10  V 2/10  V 2/10  V 2/10   Flexbar  R 2/10  G 2/10  G 2/10 G 20/20 G 2/10 R 2/10 R 2/10  R 20/20  R 20/20  R 2/10   DB E/F  5 2/10  5 2/10  6 2/10 6 2/10 6 2/10 4 2/10 4 2/10  4 2/10  4 2/10  5 2/10        S/P  1 5 2/10  2 2/10  2 2/10 2 2/10 3 2/10 1 2/10 1 2x10  1 2/10  1 2/10  1 5 2/10   SA row P3 1/10 See mid row NP  P3 2/10  P3 2/10             Upright row P3 2/10 P3 2/10 P3 2/10  P3 2/10  P3 2/10             Mid Row-2 handles 4 2/10 4 2/10 4 2/10  P4 2/10  P4 2/10             Mid Row Pro/Sup   4 1x10 ea 4 1x10  P4 1/10  P4 2/10                                                                                      Comp  Y 3'  Y3'  R 3'  R3'  R3'      ---> Y 3'  Y 3'                                                   Modalities                       US 15 15 15 NP 15 15 15  15  15  15   CP def DEF DEF DEF 15 15 15  15  15  15

## 2022-06-16 ENCOUNTER — APPOINTMENT (OUTPATIENT)
Dept: PHYSICAL THERAPY | Facility: CLINIC | Age: 37
End: 2022-06-16
Payer: COMMERCIAL

## 2022-06-17 ENCOUNTER — OFFICE VISIT (OUTPATIENT)
Dept: PHYSICAL THERAPY | Facility: CLINIC | Age: 37
End: 2022-06-17
Payer: COMMERCIAL

## 2022-06-17 DIAGNOSIS — S62.392D CLOSED NONDISPLACED FRACTURE OF OTHER PART OF THIRD METACARPAL BONE OF RIGHT HAND WITH ROUTINE HEALING, SUBSEQUENT ENCOUNTER: Primary | ICD-10-CM

## 2022-06-17 PROCEDURE — 97140 MANUAL THERAPY 1/> REGIONS: CPT

## 2022-06-17 PROCEDURE — 97110 THERAPEUTIC EXERCISES: CPT

## 2022-06-17 PROCEDURE — 97112 NEUROMUSCULAR REEDUCATION: CPT

## 2022-06-17 NOTE — PROGRESS NOTES
Daily Note     Today's date: 2022  Patient name: Paul Domínguez  : 1985  MRN: [id number]  Referring provider: Meli Franco MD  Dx:   Encounter Diagnosis     ICD-10-CM    1  Closed nondisplaced fracture of other part of third metacarpal bone of right hand with routine healing, subsequent encounter  S62 392D        Start Time: 930  Stop Time: 1045  Total time in clinic (min): 75 minutes    Subjective: Pt reports his fingers can get sore  "My strength continues to get better "    Objective: See treatment diary below  Objective: See treatment diary below    Wrist/Hand Comments  AROM right wrist E/F- 60/75                      MF MP- 0/95; PIP- 0/100; DIP- 0/80                      RF MP- 0/90; PIP- 0/100; DIP- 0/80                      SF MP- 0/100 PIP- 0/105 DIP-0/75  Inspection- moderate swelling 3rd, 4th MCP  Strength-  II- 80/120; 3 YULIA- 16/20; Kaur- 20/20  Circumference at MCP- R/L- 20  5 cm; MF- P1- 6 5 cm; RF P1- 6 2 cm  Palpation- tenderness at MF/RF MCP    Assessment: Tolerated treatment well today  Pt did display some fatigue and soreness during session  Pt felt relief during and post manual therapy  Plan: Progress treatment as tolerated         Precautions: avoid heavy grasp until cleared in therapy    Manuals            STM 15 15 15 15 DEF 15 15  15  15  15   Vishnu tapes                       Kenesio tape          TS  TS TS  TS                           Neuro Re-Ed                       HP/pulsed biph 15 15 15 15 15 15 15  15  15  15                                                   Ther Ex                       TGE 5x 5x 5x 5x 5x 5x 5x  5x  5x  5x   rubberband abd  2/5 2/10  2/5  2/5 2/5    2/5 2/5  2/5  2/5  2/5   TP   T/Y 3'  T/Y 3' Y/R 2' R 2' R 2' R 2' T/Y 2'  T/Y 3'  T/Y 3'  T/Y 3'   TP 5 finger  T/Y 3'  T/Y 3'  Y/R 3' R 2' R 2' R 2' T/Y 2'  T/Y 3'  T/Y 3'  T/Y 3'   Gaudencio  25 2/10  35 2/10  35 /10 35 /10 50 /10 50 /10 20 2/10  25 2/10  25 2/10  25 2/10   Blue  V 2/10  V 2/10  V 2/10 V 2/10 VI 2/10 VI 2/10 IV 2/10  V 2/10  V 2/10  V 2/10   Flexbar  R 2/10  G 2/10  G 2/10 G 20/20 G 2/10 B 2/10 R 2/10  R 20/20  R 20/20  R 2/10   DB E/F  5 2/10  5 2/10  6 2/10 6 2/10 6 2/10 6 2/10 4 2/10  4 2/10  4 2/10  5 2/10        S/P  1 5 2/10  2 2/10  2 2/10 2 2/10 3 2/10 3 2/10 1 2x10  1 2/10  1 2/10  1 5 2/10   SA row P3 1/10 See mid row NP  P3 2/10  P3 2/10  P3 2/10           Upright row P3 2/10 P3 2/10 P3 2/10  P3 2/10  P3 2/10  P3 2/10           Mid Row-2 handles 4 2/10 4 2/10 4 2/10  P4 2/10  P4 2/10  P4 2/10           Mid Row Pro/Sup   4 1x10 ea 4 1x10  P4 1/10  P4 2/10  P4 2/10                                                                                    Comp  Y 3'  Y3'  R 3'  R3'  R3'  R3    ---> Y 3'  Y 3'                                                   Modalities                       US 15 15 15 NP 15 15 15  15  15  15   CP def DEF DEF DEF 15 15 15  15  15  15

## 2022-06-21 ENCOUNTER — OFFICE VISIT (OUTPATIENT)
Dept: PHYSICAL THERAPY | Facility: CLINIC | Age: 37
End: 2022-06-21
Payer: COMMERCIAL

## 2022-06-21 DIAGNOSIS — S62.392D CLOSED NONDISPLACED FRACTURE OF OTHER PART OF THIRD METACARPAL BONE OF RIGHT HAND WITH ROUTINE HEALING, SUBSEQUENT ENCOUNTER: Primary | ICD-10-CM

## 2022-06-21 PROCEDURE — 97140 MANUAL THERAPY 1/> REGIONS: CPT

## 2022-06-21 PROCEDURE — 97110 THERAPEUTIC EXERCISES: CPT

## 2022-06-21 PROCEDURE — 97112 NEUROMUSCULAR REEDUCATION: CPT

## 2022-06-21 NOTE — PROGRESS NOTES
Daily Note     Today's date: 2022  Patient name: Isidra Ferguson  : 1985  MRN: [id number]  Referring provider: Alexis Bustamatne MD  Dx:   Encounter Diagnosis     ICD-10-CM    1  Closed nondisplaced fracture of other part of third metacarpal bone of right hand with routine healing, subsequent encounter  [id number]                   Subjective: Pt reports increased flexibility and strength in his hand  Objective: See treatment diary below  Objective: See treatment diary below    Wrist/Hand Comments  AROM right wrist E/F- 60/75                      MF MP- 0/95; PIP- 0/100; DIP- 0/80                      RF MP- 0/90; PIP- 0/100; DIP- 0/80                      SF MP- 0/100 PIP- 0/105 DIP-0/75  Inspection- moderate swelling 3rd, 4th MCP  Strength-  II- 80/120; 3 YULIA- 16/20; Kaur- 20/20  Circumference at MCP- R/L- 20  5 cm; MF- P1- 6 5 cm; RF P1- 6 2 cm  Palpation- tenderness at MF/RF MCP      Assessment: Tolerated treatment well today  Pt able to improve strength and resistance in the facility  Plan: Progress treatment as tolerated         Precautions: avoid heavy grasp until cleared in therapy    Manuals  6         STM 15 15 15 15 DEF 15 15  15  15  15   Vishnu tapes                       Kenesio tape          TS prev  TS TS  TS                           Neuro Re-Ed                       HP/pulsed biph 15 15 15 15 15 15 15  15  15  15                                                   Ther Ex                       TGE 5x 5x 5x 5x 5x 5x 5x  5x  5x  5x   rubberband abd  2/5 2/10  25  2/5 25    2 2  2  2   TP   T/Y 3'  T/Y 3' Y/R 2' R 2' R 2' R 2' R 2'  T/Y 3'  T/Y 3'  T/Y 3'   TP 5 finger  T/Y 3'  T/Y 3'  Y/R 3' R 2' R 2' R 2' R 2'  T/Y 3'  T/Y 3'  T/Y 3'   Gaudencio  25 2/10  35 2/10  35 2/10 35 2/10 50 2/10 50 2/10 50 2/10  25 2/10  25 2/10  25 2/10   Blue  V 2/10  V 10  V 10 V 10 VI /10 VI /10 VI /10  V 10  V 10  V 10   Flexbar  R 10  G 2/10  G 2/10 G 20/20 G 2/10 B 2/10 B 2/10  R 20/20  R 20/20  R 2/10   DB E/F  5 2/10  5 2/10  6 2/10 6 2/10 6 2/10 6 2/10 7 2/10  4 2/10  4 2/10  5 2/10        S/P  1 5 2/10  2 2/10  2 2/10 2 2/10 3 2/10 3 2/10 3 2x10  1 2/10  1 2/10  1 5 2/10   SA row P3 1/10 See mid row NP  P3 2/10  P3 2/10  P3 2/10  P4 2/10         Upright row P3 2/10 P3 2/10 P3 2/10  P3 2/10  P3 2/10  P3 2/10  P4 2/10         Mid Row-2 handles 4 2/10 4 2/10 4 2/10  P4 2/10  P4 2/10  P4 2/10  P4 2/10         Mid Row Pro/Sup   4 1x10 ea 4 1x10  P4 1/10  P4 2/10  P4 2/10  P4 2/10                                                                                  Comp  Y 3'  Y3'  R 3'  R3'  R3'  R3  R3'  ---> Y 3'  Y 3'                                                   Modalities                       US 15 15 15 NP 15 15 DC PRN  15  15  15   CP def DEF DEF DEF 15 15 15  15  15  15

## 2022-06-23 ENCOUNTER — OFFICE VISIT (OUTPATIENT)
Dept: PHYSICAL THERAPY | Facility: CLINIC | Age: 37
End: 2022-06-23
Payer: COMMERCIAL

## 2022-06-23 DIAGNOSIS — S62.392D CLOSED NONDISPLACED FRACTURE OF OTHER PART OF THIRD METACARPAL BONE OF RIGHT HAND WITH ROUTINE HEALING, SUBSEQUENT ENCOUNTER: Primary | ICD-10-CM

## 2022-06-23 PROCEDURE — 97112 NEUROMUSCULAR REEDUCATION: CPT | Performed by: PHYSICAL THERAPIST

## 2022-06-23 PROCEDURE — 97110 THERAPEUTIC EXERCISES: CPT | Performed by: PHYSICAL THERAPIST

## 2022-06-23 PROCEDURE — 97140 MANUAL THERAPY 1/> REGIONS: CPT | Performed by: PHYSICAL THERAPIST

## 2022-06-23 PROCEDURE — 97035 APP MDLTY 1+ULTRASOUND EA 15: CPT | Performed by: PHYSICAL THERAPIST

## 2022-06-23 NOTE — PROGRESS NOTES
Daily Note     Today's date: 2022  Patient name: Sarah Rivera  : 1985  MRN: [id number]  Referring provider: Carlita Henderson MD  Dx:   Encounter Diagnosis     ICD-10-CM    1  Closed nondisplaced fracture of other part of third metacarpal bone of right hand with routine healing, subsequent encounter  [id number]                   Subjective: pt notes continued tightness with grasping, especially along the 4th MCP  Objective: See treatment diary below    Pt fitted with a composite flexion strap to increase gently passive stretch at home  AROM right wrist E/F- 60/75                      MF MP- 0/95; PIP- 0/100; DIP- 0/80                      RF MP- 0/90; PIP- 0/100; DIP- 0/80                      SF MP- 0/100 PIP- 0/105 DIP-0/75  Inspection- moderate swelling 3rd, 4th MCP  Strength-  II- 80/120; 3 YULIA- 16/20; Kaur- 20/20  Circumference at MCP- R/L- 20  5 cm; MF- P1- 6 5 cm; RF P1- 6 2 cm  Palpation- tenderness at MF/RF MCP    Assessment: Tolerated treatment well  Patient would benefit from continued PT      Plan: Continue per plan of care        Precautions: avoid heavy grasp until cleared in therapy    Manuals  6       STM 15 15 15 15 DEF 15 15  15  15  15   Flexion strap                1 MCP                                               Neuro Re-Ed                       HP/pulsed biph 15 15 15 15 15 15 15  15  15  15                                                   Ther Ex                       TGE 5x 5x 5x 5x 5x 5x 5x  5x  5x  5x   rubberband abd  2/5 2/10  2/5  2/5 2/5    2/5 2/5  2/5  2/5  2/5   TP   T/Y 3'  T/Y 3' Y/R 2' R 2' R 2' R 2' R 2'  R 3'  T/Y 3'  T/Y 3'   TP 5 finger  T/Y 3'  T/Y 3'  Y/R 3' R 2' R 2' R 2' R 2'  R 3'  T/Y 3'  T/Y 3'   Gaudencio  25 2/10  35 2/10  35 2/10 35 2/10 50 2/10 50 2/10 50 2/10  50 2/10  25 2/10  25 2/10   Blue  V 2/10  V 2/10  V 2/10 V 2/10 VI 2/10 VI 2/10 VI 2/10  VI 2/10  V 2/10  V 2/10   Flexbar  R 2/10  G 2/10  G 2/10 G 20/20 G 2/10 B 2/10 B 2/10  B 20/20  R 20/20  R 2/10   DB E/F  5 2/10  5 2/10  6 2/10 6 2/10 6 2/10 6 2/10 7 2/10  7 2/10  4 2/10  5 2/10        S/P  1 5 2/10  2 2/10  2 2/10 2 2/10 3 2/10 3 2/10 3 2x10  3 2/10  1 2/10  1 5 2/10   SA row P3 1/10 See mid row NP  P3 2/10  P3 2/10  P3 2/10  P4 2/10  P5 2/10       Upright row P3 2/10 P3 2/10 P3 2/10  P3 2/10  P3 2/10  P3 2/10  P4 2/10  P5 2/10       Mid Row-2 handles 4 2/10 4 2/10 4 2/10  P4 2/10  P4 2/10  P4 2/10  P4 2/10  P5 2/10       Mid Row Pro/Sup   4 1x10 ea 4 1x10  P4 1/10  P4 2/10  P4 2/10  P4 2/10  P5 2/10                                                                                Comp  Y 3'  Y3'  R 3'  R3'  R3'  R3  R3' R 3' Y 3'  Y 3'                                                   Modalities                       US 15 15 15 NP 15 15 DC PRN

## 2022-06-28 ENCOUNTER — APPOINTMENT (OUTPATIENT)
Dept: PHYSICAL THERAPY | Facility: CLINIC | Age: 37
End: 2022-06-28
Payer: COMMERCIAL

## 2022-06-29 ENCOUNTER — OFFICE VISIT (OUTPATIENT)
Dept: PHYSICAL THERAPY | Facility: CLINIC | Age: 37
End: 2022-06-29
Payer: COMMERCIAL

## 2022-06-29 DIAGNOSIS — S62.392D CLOSED NONDISPLACED FRACTURE OF OTHER PART OF THIRD METACARPAL BONE OF RIGHT HAND WITH ROUTINE HEALING, SUBSEQUENT ENCOUNTER: Primary | ICD-10-CM

## 2022-06-29 PROCEDURE — 97110 THERAPEUTIC EXERCISES: CPT

## 2022-06-29 PROCEDURE — 97140 MANUAL THERAPY 1/> REGIONS: CPT

## 2022-06-29 PROCEDURE — 97112 NEUROMUSCULAR REEDUCATION: CPT

## 2022-06-29 NOTE — PROGRESS NOTES
Daily Note     Today's date: 2022  Patient name: Garo Suero  : 1985  MRN: [id number]  Referring provider: Maritza Rose MD  Dx:   Encounter Diagnosis     ICD-10-CM    1  Closed nondisplaced fracture of other part of third metacarpal bone of right hand with routine healing, subsequent encounter  [id number]                   Subjective: Pt reports always feeling a baseline amount of tightness in the morning but otherwise no complaints  Objective: See treatment diary below  Pt fitted with a composite flexion strap to increase gently passive stretch at home  AROM right wrist E/F- 60/75                      MF MP- 0/95; PIP- 0/100; DIP- 0/80                      RF MP- 0/90; PIP- 0/100; DIP- 0/80                      SF MP- 0/100 PIP- 0/105 DIP-0/75  Inspection- moderate swelling 3rd, 4th MCP  Strength-  II- 80/120; 3 YULIA- 16/20; Kaur- 20/20  Circumference at MCP- R/L-  5 cm; MF- P1- 6 5 cm; RF P1- 6 2 cm  Palpation- tenderness at MF/RF MCP    Assessment: Pt continues to tolerate strengthening well without significant complaints  Fatigue noted post indicating therapeutic dosage achieved  Pt will benefit from continued skilled PT  Plan: Continue per plan of care        Precautions: avoid heavy grasp until cleared in therapy    Manuals      STM 15 15 15 15 DEF 15 15  15  15  15   Flexion strap                1 MCP                                               Neuro Re-Ed                       HP/pulsed biph 15 15 15 15 15 15 15  15  15  15                                                   Ther Ex                       TGE 5x 5x 5x 5x 5x 5x 5x  5x  5x  5x   rubberband abd  2/5 2/10  2/5  2/5 2/5    2/5 2/5  2/5  2/5  2/5   TP   T/Y 3'  T/Y 3' Y/R 2' R 2' R 2' R 2' R 2'  R 3'  T/Y 3'  T/Y 3'   TP 5 finger  T/Y 3'  T/Y 3'  Y/R 3' R 2' R 2' R 2' R 2'  R 3'  T/Y 3'  T/Y 3'   Gaudencio  25 2/10  35 2/10  35 2/10 35 2/10 50 2/10 50 2/10 50 /10  50 2/10  25 2/10  25 2/10   Blue  V 2/10  V 2/10  V 2/10 V 2/10 VI 2/10 VI 2/10 VI 2/10  VI 2/10  V 2/10  V 2/10   Flexbar  R 2/10  G 2/10  G 2/10 G 20/20 G 2/10 B 2/10 B 2/10  B 20/20  R 20/20  R 2/10   DB E/F  5 2/10  5 2/10  6 2/10 6 2/10 6 2/10 6 2/10 7 2/10  7 2/10  4 2/10  5 2/10        S/P  1 5 2/10  2 2/10  2 2/10 2 2/10 3 2/10 3 2/10 3 2x10  3 2/10  1 2/10  1 5 2/10   SA row P3 1/10 See mid row NP  P3 2/10  P3 2/10  P3 2/10  P4 2/10  P5 2/10  P5 2/10     Upright row P3 2/10 P3 2/10 P3 2/10  P3 2/10  P3 2/10  P3 2/10  P4 2/10  P5 2/10  P5 2/10     Mid Row-2 handles 4 2/10 4 2/10 4 2/10  P4 2/10  P4 2/10  P4 2/10  P4 2/10  P5 2/10   P5 2/10     Mid Row Pro/Sup   4 1x10 ea 4 1x10  P4 1/10  P4 2/10  P4 2/10  P4 2/10  P5 2/10  P5 2/10                                                                              Comp  Y 3'  Y3'  R 3'  R3'  R3'  R3  R3' R 3' Y 3'  Y 3'                                                   Modalities                       US 15 15 15 NP 15 15 DC PRN

## 2022-06-29 NOTE — PROGRESS NOTES
PT Re-Evaluation     Today's date: 2022  Patient name: Zac Wheeler  : 1985  MRN: [id number]  Referring provider: Mario Alberto Cedillo MD  Dx:   Encounter Diagnosis     ICD-10-CM    1  Closed nondisplaced fracture of other part of third metacarpal bone of right hand with routine healing, subsequent encounter  S62 392D        Start Time: 1055  Stop Time: 1200  Total time in clinic (min): 65 minutes    Assessment  Assessment details: Pt is a 40 YO male presenting to PT with pain, decreased AROM, strength and tolerance to activity  Pt would benefit from skilled intervention to address these issues and maximize overall function  Occupation- not working  Dominant- right; Involved- right  Pt notes improvement with AROM and strength, but notes limitations in function, especially with tight and heavy grasp  He feels his LF, RF and SF MCP remain tight and restrictive  Pain continues at the RF MCP with composite flexion  Goals  ST  Decrease pain to 0-2/10 in 4 weeks            2  Decrease swelling right hand in 6-8 weeks            3  [facility]            4   Provide orthotic for protection, compression for support  LT  Increase functional motion and strength for independence with ADL and self care by DC            2  Ability to RTW and recreational activity by DC    Plan  Patient would benefit from: skilled physical therapy  Planned modality interventions: thermotherapy: hydrocollator packs, ultrasound and cryotherapy  Planned therapy interventions: activity modification, neuromuscular re-education, strengthening, stretching, therapeutic activities, therapeutic exercise and home exercise program  Frequency: 2x week  Duration in weeks: 4  Treatment plan discussed with: patient        Subjective Evaluation    History of Present Illness  Date of onset: 2022  Mechanism of injury: Pt tripped on a broken step, injuring his ankle    Pt became angry and punched a wall, fracturing his right 3rd MC  Pt was diagnosed with a closed non-displaced fracture of his right 2rd MC and was casted for 5 weeks until 3/18/2022      Pain  Current pain ratin  At best pain ratin  At worst pain ratin  Location: right RF> MF MCP  Quality: sharp and knife-like    Hand dominance: right    Treatments  Current treatment: physical therapy  Patient Goals  Patient goals for therapy: decreased edema, decreased pain, increased motion, increased strength, independence with ADLs/IADLs, return to sport/leisure activities and return to work          Objective     General Comments:      Wrist/Hand Comments  AROM right wrist E/F- 60/75                      MF MP- 0/95; PIP- 0/100; DIP- 0/80                      RF MP- 0/90; PIP- 0/100; DIP- 0/80                      SF MP- 0/100 PIP- 0/105 DIP-0/75  Inspection- moderate swelling 3rd, 4th MCP  Strength-  II- 80/120; 3 YULIA- 16/; Kaur- 20/20  Circumference at MCP- R/L-  5 cm; MF- P1- 6 5 cm; RF P1- 6 2 cm  Palpation- tenderness at MF/RF MCP             Precautions: avoid heavy grasp until cleared in therapy    Manuals  6/     STM 15 15 15 15 DEF 15 15  15  15  15   Flexion strap                1 MCP                                                       Neuro Re-Ed                       HP/pulsed biph 15 15 15 15 15 15 15  15  15  15                                                   Ther Ex                       TGE 5x 5x 5x 5x 5x 5x 5x  5x  5x  5x   rubberband abd  /5 /10  2/5  25 25    25 2/5  2  2   TP   T/Y 3'  T/Y 3' Y/R 2' R 2' R 2' R 2' R 2'  R 3'  R 3'  T/Y 3'   TP 5 finger  T/Y 3'  T/Y 3'  Y/R 3' R 2' R 2' R 2' R 2'  R 3' R 3'  T/Y 3'   Gaudencio  25 2/10  35 2/10  35 2/10 35 2/10 50 2/10 50 2/10 50 2/10  50 2/10  50 2/10  25 2/10   Blue  V 2/10  V 2/10  V 2/10 V 2/10 VI 2/10 VI 2/10 VI 2/10  VI 2/10  VI 2/10  V 2/10   Flexbar  R 2/10  G 2/10  G 2/10 G  G 2/10 B 2/10 B 2/10  B 20/20  B 20/20  R 2/10   DB E/F  5 2/10  5 2/10  6 2/10 6 2/10 6 2/10 6 2/10 7 2/10  7 2/10  7 2/10  5 2/10        S/P  1 5 2/10  2 2/10  2 2/10 2 2/10 3 2/10 3 2/10 3 2x10  3 2/10  3 2/10  1 5 2/10   SA row P3 1/10 See mid row NP  P3 2/10  P3 2/10  P3 2/10  P4 2/10  P5 2/10  P5 2/10     Upright row P3 2/10 P3 2/10 P3 2/10  P3 2/10  P3 2/10  P3 2/10  P4 2/10  P5 2/10  P5 2/10     Mid Row-2 handles 4 2/10 4 2/10 4 2/10  P4 2/10  P4 2/10  P4 2/10  P4 2/10  P5 2/10   P5 2/10     Mid Row Pro/Sup   4 1x10 ea 4 1x10  P4 1/10  P4 2/10  P4 2/10  P4 2/10  P5 2/10  P5 2/10                                                                              Comp  Y 3'  Y3'  R 3'  R3'  R3'  R3  R3' R 3' Y 3'  Y 3'                                                   Modalities                       US 15 15 15 NP 15 15 DC PRN

## 2022-06-29 NOTE — LETTER
2022    [address]  [address] [facility] [address]    Patient: Tor Bobo   YOB: 1985   Date of Visit: 2022     Encounter Diagnosis     ICD-10-CM    1  Closed nondisplaced fracture of other part of third metacarpal bone of right hand with routine healing, subsequent encounter  S62 392D        Dear Dr Emmanuel Petersen:    Thank you for your recent referral of Tor Bobo  Please review the attached evaluation summary from Pj's recent visit  Please verify that you agree with the plan of care by signing the attached order  If you have any questions or concerns, please do not hesitate to call  I sincerely appreciate the opportunity to share in the care of one of your patients and hope to have another opportunity to work with you in the near future  Sincerely,    Krissy Crowe, DPT, CHT    Referring Provider:      I certify that I have read the below Plan of Care and certify the need for these services furnished under this plan of treatment while under my care  MD [address]  [address] [facility] [address]  Via Fax: [phone]          Daily Note     Today's date: 2022  Patient name: Tor Bobo  : 1985  MRN: [id number]  Referring provider: Michael Villarreal MD  Dx:   Encounter Diagnosis     ICD-10-CM    1  Closed nondisplaced fracture of other part of third metacarpal bone of right hand with routine healing, subsequent encounter  S62 392D                   Subjective: Pt reports always feeling a baseline amount of tightness in the morning but otherwise no complaints         Objective: See treatment diary below  Pt fitted with a composite flexion strap to increase gently passive stretch at home  AROM right wrist E/F- 60/75                      MF MP- 0/95; PIP- 0/100; DIP- 0/80                      RF MP- 0/90; PIP- 0/100; DIP- 0/80                      SF MP- 0/100 PIP- 0/105 DIP-0/75  Inspection- moderate swelling 3rd, 4th MCP  Strength-  II- 80/120; 3 YULIA- 16/20; Kaur- 20/20  Circumference at MCP- R/L- 20 5/19 5 cm; MF- P1- 6 5 cm; RF P1- 6 2 cm  Palpation- tenderness at MF/RF MCP    Assessment: Pt continues to tolerate strengthening well without significant complaints  Fatigue noted post indicating therapeutic dosage achieved  Pt will benefit from continued skilled PT  Plan: Continue per plan of care        Precautions: avoid heavy grasp until cleared in therapy    Manuals 5/19 5/24 6/2 6/7 6/14 6/17 6/21 6/23 6/29     STM 15 15 15 15 DEF 15 15  15  15  15   Flexion strap                1 MCP                                               Neuro Re-Ed                       HP/pulsed biph 15 15 15 15 15 15 15  15  15  15                                                   Ther Ex                       TGE 5x 5x 5x 5x 5x 5x 5x  5x  5x  5x   rubberband abd  2/5 2/10  2/5  2/5 2/5    2/5 2/5  2/5 2/5 2/5   TP   T/Y 3'  T/Y 3' Y/R 2' R 2' R 2' R 2' R 2'  R 3'  T/Y 3'  T/Y 3'   TP 5 finger  T/Y 3'  T/Y 3'  Y/R 3' R 2' R 2' R 2' R 2'  R 3'  T/Y 3'  T/Y 3'   Gaudencio  25 2/10  35 2/10  35 2/10 35 2/10 50 2/10 50 2/10 50 2/10  50 2/10  25 2/10  25 2/10   Blue  V 2/10  V 2/10  V 2/10 V 2/10 VI 2/10 VI 2/10 VI 2/10  VI 2/10  V 2/10  V 2/10   Flexbar  R 2/10  G 2/10  G 2/10 G 20/20 G 2/10 B 2/10 B 2/10  B 20/20  R 20/20  R 2/10   DB E/F  5 2/10  5 2/10  6 2/10 6 2/10 6 2/10 6 2/10 7 2/10  7 2/10  4 2/10  5 2/10        S/P  1 5 2/10  2 2/10  2 2/10 2 2/10 3 2/10 3 2/10 3 2x10  3 2/10  1 2/10  1 5 2/10   SA row P3 1/10 See mid row NP  P3 2/10  P3 2/10  P3 2/10  P4 2/10  P5 2/10  P5 2/10     Upright row P3 2/10 P3 2/10 P3 2/10  P3 2/10  P3 2/10  P3 2/10  P4 2/10  P5 2/10  P5 2/10     Mid Row-2 handles 4 2/10 4 2/10 4 2/10  P4 2/10  P4 2/10  P4 2/10  P4 2/10  P5 2/10   P5 2/10     Mid Row Pro/Sup   4 1x10 ea 4 1x10  P4 1/10  P4 2/10  P4 2/10  P4 2/10  P5 2/10  P5 2/10                                                                              Comp  Y 3'  Y3'  R 3'  R3'  R3'  R3  R3' R 3' Y 3'  Y 3'                                                   Modalities                       US 15 15 15 NP 15 15 DC PRN                                                PT Re-Evaluation     Today's date: 2022  Patient name: Isidra Ferguson  : 1985  MRN: [id number]  Referring provider: Alexis Bustamante MD  Dx:   Encounter Diagnosis     ICD-10-CM    1  Closed nondisplaced fracture of other part of third metacarpal bone of right hand with routine healing, subsequent encounter  S62 392D        Start Time: 1055  Stop Time: 1200  Total time in clinic (min): 65 minutes    Assessment  Assessment details: Pt is a 40 YO male presenting to PT with pain, decreased AROM, strength and tolerance to activity  Pt would benefit from skilled intervention to address these issues and maximize overall function  Occupation- not working  Dominant- right; Involved- right  Pt notes improvement with AROM and strength, but notes limitations in function, especially with tight and heavy grasp  He feels his LF, RF and SF MCP remain tight and restrictive  Pain continues at the RF MCP with composite flexion  Goals  ST  Decrease pain to 0-2/10 in 4 weeks            2  Decrease swelling right hand in 6-8 weeks            3  Increase AROM to [facility] in 6-8 weeks            4   Provide orthotic for protection, compression for support  LT  Increase functional motion and strength for independence with ADL and self care by DC            2   Ability to RTW and recreational activity by DC    Plan  Patient would benefit from: skilled physical therapy  Planned modality interventions: thermotherapy: hydrocollator packs, ultrasound and cryotherapy  Planned therapy interventions: activity modification, neuromuscular re-education, strengthening, stretching, therapeutic activities, therapeutic exercise and home exercise program  Frequency: 2x week  Duration in weeks: 4  Treatment plan discussed with: patient        Subjective Evaluation    History of Present Illness  Date of onset: 2022  Mechanism of injury: Pt tripped on a broken step, injuring his ankle  Pt became angry and punched a wall, fracturing his right 3rd MC  Pt was diagnosed with a closed non-displaced fracture of his right 2rd MC and was casted for 5 weeks until 3/18/2022      Pain  Current pain ratin  At best pain ratin  At worst pain ratin  Location: right RF> MF MCP  Quality: sharp and knife-like    Hand dominance: right    Treatments  Current treatment: physical therapy  Patient Goals  Patient goals for therapy: decreased edema, decreased pain, increased motion, increased strength, independence with ADLs/IADLs, return to sport/leisure activities and return to work          Objective     General Comments:      Wrist/Hand Comments  AROM right wrist E/F- 60/75                      MF MP- 0/95; PIP- 0/100; DIP- 0/80                      RF MP- 0/90; PIP- 0/100; DIP- 0/80                      SF MP- 0/100 PIP- 0/105 DIP-0/75  Inspection- moderate swelling 3rd, 4th MCP  Strength-  II- 80/120; 3 YULIA- 16/20; Kaur- 20/20  Circumference at MCP- R/L- 20  5 cm; MF- P1- 6 5 cm; RF P1- 6 2 cm  Palpation- tenderness at MF/RF MCP             Precautions: avoid heavy grasp until cleared in therapy    Manuals  6/     STM 15 15 15 15 DEF 15 15  15  15  15   Flexion strap                1 MCP                                                       Neuro Re-Ed                       HP/pulsed biph 15 15 15 15 15 15 15  15  15  15                                                   Ther Ex                       TGE 5x 5x 5x 5x 5x 5x 5x  5x  5x  5x   rubberband abd  2/5 2/10  2/5  2/5 2/5    2/5 2/5  2/5  2/5  2/5   TP   T/Y 3'  T/Y 3' Y/R 2' R 2' R 2' R 2' R 2'  R 3'  R 3'  T/Y 3'   TP 5 finger  T/Y 3'  T/Y 3'  Y/R 3' R 2' R 2' R 2' R 2'  R 3' R 3'  T/Y 3'   Gaudencio  25 2/10  35 2/10  35 2/10 35 2/10 50 2/10 50 2/10 50 2/10  50 2/10  50 2/10  25 2/10   Blue  V 2/10  V 2/10  V 2/10 V 2/10 VI 2/10 VI 2/10 VI 2/10  VI 2/10  VI 2/10  V 2/10   Flexbar  R 2/10  G 2/10  G 2/10 G 20/20 G 2/10 B 2/10 B 2/10  B 20/20  B 20/20  R 2/10   DB E/F  5 2/10  5 2/10  6 2/10 6 2/10 6 2/10 6 2/10 7 2/10  7 2/10  7 2/10  5 2/10        S/P  1 5 2/10  2 2/10  2 2/10 2 2/10 3 2/10 3 2/10 3 2x10  3 2/10  3 2/10  1 5 2/10   SA row P3 1/10 See mid row NP  P3 2/10  P3 2/10  P3 2/10  P4 2/10  P5 2/10  P5 2/10     Upright row P3 2/10 P3 2/10 P3 2/10  P3 2/10  P3 2/10  P3 2/10  P4 2/10  P5 2/10  P5 2/10     Mid Row-2 handles 4 2/10 4 2/10 4 2/10  P4 2/10  P4 2/10  P4 2/10  P4 2/10  P5 2/10   P5 2/10     Mid Row Pro/Sup   4 1x10 ea 4 1x10  P4 1/10  P4 2/10  P4 2/10  P4 2/10  P5 2/10  P5 2/10                                                                              Comp  Y 3'  Y3'  R 3'  R3'  R3'  R3  R3' R 3' Y 3'  Y 3'                                                   Modalities                       US 15 15 15 NP 15 15 DC PRN

## 2022-07-05 ENCOUNTER — OFFICE VISIT (OUTPATIENT)
Dept: PHYSICAL THERAPY | Facility: CLINIC | Age: 37
End: 2022-07-05
Payer: COMMERCIAL

## 2022-07-05 DIAGNOSIS — S62.392D CLOSED NONDISPLACED FRACTURE OF OTHER PART OF THIRD METACARPAL BONE OF RIGHT HAND WITH ROUTINE HEALING, SUBSEQUENT ENCOUNTER: Primary | ICD-10-CM

## 2022-07-05 PROCEDURE — 97112 NEUROMUSCULAR REEDUCATION: CPT

## 2022-07-05 PROCEDURE — 97110 THERAPEUTIC EXERCISES: CPT

## 2022-07-05 PROCEDURE — 97140 MANUAL THERAPY 1/> REGIONS: CPT

## 2022-07-05 NOTE — PROGRESS NOTES
Daily Note     Today's date: 2022  Patient name: Oneil Diallo  : 1985  MRN: [id number]  Referring provider: Lia Borja MD  Dx:   Encounter Diagnosis     ICD-10-CM    1  Closed nondisplaced fracture of other part of third metacarpal bone of right hand with routine healing, subsequent encounter  Q09 089D                   Subjective: Pt reports his knuckle is sore today  Pt "stressing it at home "      Objective: See treatment diary below  Wrist/Hand Comments  AROM right wrist E/F- 60/75                      MF MP- 0/95; PIP- 0/100; DIP- 0/80                      RF MP- 0/90; PIP- 0/100; DIP- 0/80                      SF MP- 0/100 PIP- 0/105 DIP-0/75  Inspection- moderate swelling 3rd, 4th MCP  Strength-  II- 80/120; 3 YULIA- 16/20; Kaur- /20  Circumference at MCP- R/L- 20  5 cm; MF- P1- 6 5 cm; RF P1- 6 2 cm  Palpation- tenderness at MF/RF MCP          Assessment: Tolerated treatment well today  Pt demonstrating good strength and endurance in clinic  Plan: Progress treatment as tolerated         Precautions: avoid heavy grasp until cleared in therapy    Manuals      STM 15 15 15 15 DEF 15 15  15  15  15   Flexion strap                1 MCP                                                       Neuro Re-Ed                       HP/pulsed biph 15 15 15 15 15 15 15  15  15  15                                                   Ther Ex                       TGE 5x 5x 5x 5x 5x 5x 5x  5x  5x  5x   rubberband abd  2/5 2/10  2/5  2/5 2/5    2/5 2/5  2/5  2/5  2/5   TP   R 3'  T/Y 3' Y/R 2' R 2' R 2' R 2' R 2'  R 3'  R 3'  T/Y 3'   TP 5 finger R 3'  T/Y 3'  Y/R 3' R 2' R 2' R 2' R 2'  R 3' R 3'  T/Y 3'   Gaudencio 50 2/10  35 2/10  35 2/10 35 2/10 50 2/10 50 2/10 50 2/10  50 2/10  50 2/10  25 2/10   Blue  VI 2/10  V 2/10  V 2/10 V 2/10 VI 2/10 VI 2/10 VI 2/10  VI 2/10  VI 2/10  V 2/10   Flexbar  B 2/10  G 2/10  G 2/10 G  G 2/10 B 2/10 B 2/10  B   B 20/20  R 2/10   DB E/F  7 2/10  5 2/10  6 2/10 6 2/10 6 2/10 6 2/10 7 2/10  7 2/10  7 2/10  5 2/10        S/P  3 2/10  2 2/10  2 2/10 2 2/10 3 2/10 3 2/10 3 2x10  3 2/10  3 2/10  1 5 2/10   SA row P5 1/10 See mid row NP  P3 2/10  P3 2/10  P3 2/10  P4 2/10  P5 2/10  P5 2/10     Upright row P5 2/10 P3 2/10 P3 2/10  P3 2/10  P3 2/10  P3 2/10  P4 2/10  P5 2/10  P5 2/10     Mid Row-2 handles  P5 2/10 4 2/10 4 2/10  P4 2/10  P4 2/10  P4 2/10  P4 2/10  P5 2/10   P5 2/10     Mid Row Pro/Sup  P5 2/10 4 1x10 ea 4 1x10  P4 1/10  P4 2/10  P4 2/10  P4 2/10  P5 2/10  P5 2/10                                                                              Comp  R 3'  Y3'  R 3'  R3'  R3'  R3  R3' R 3' Y 3'  Y 3'                                                   Modalities                       US DC 15 15 NP 15 15 DC PRN

## 2022-07-07 ENCOUNTER — APPOINTMENT (OUTPATIENT)
Dept: PHYSICAL THERAPY | Facility: CLINIC | Age: 37
End: 2022-07-07
Payer: COMMERCIAL

## 2022-07-08 ENCOUNTER — OFFICE VISIT (OUTPATIENT)
Dept: PHYSICAL THERAPY | Facility: CLINIC | Age: 37
End: 2022-07-08
Payer: COMMERCIAL

## 2022-07-08 DIAGNOSIS — S62.392D CLOSED NONDISPLACED FRACTURE OF OTHER PART OF THIRD METACARPAL BONE OF RIGHT HAND WITH ROUTINE HEALING, SUBSEQUENT ENCOUNTER: Primary | ICD-10-CM

## 2022-07-08 PROCEDURE — 97110 THERAPEUTIC EXERCISES: CPT

## 2022-07-08 PROCEDURE — 97112 NEUROMUSCULAR REEDUCATION: CPT

## 2022-07-08 PROCEDURE — 97140 MANUAL THERAPY 1/> REGIONS: CPT

## 2022-07-08 NOTE — PROGRESS NOTES
Daily Note     Today's date: 2022  Patient name: Luis Miguel Hill  : 1985  MRN: [id number]  Referring provider: Heena Edmonds MD  Dx:   Encounter Diagnosis     ICD-10-CM    1  Closed nondisplaced fracture of other part of third metacarpal bone of right hand with routine healing, subsequent encounter  [id number]                   Subjective: Pt reports some soreness but "I am pushing it at home"      Objective: See treatment diary below    Objective: See treatment diary below  Wrist/Hand Comments  AROM right wrist E/F- 60/75                      MF MP- 0/95; PIP- 0/100; DIP- 0/80                      RF MP- 0/90; PIP- 0/100; DIP- 0/80                      SF MP- 0/100 PIP- 0/105 DIP-0/75  Inspection- moderate swelling 3rd, 4th MCP  Strength-  II- 80/120; 3 YULIA- 16/20; Kaur- 20/20  Circumference at MCP- R/L- 20  5 cm; MF- P1- 6 5 cm; RF P1- 6 2 cm  Palpation- tenderness at MF/RF MCP        Assessment: Tolerated treatment well today  Pt able to progress today with PRE's    Plan: Progress treatment as tolerated         Precautions: avoid heavy grasp until cleared in therapy    Manuals    STM 15 15 15 15 DEF 15 15  15  15  15   Flexion strap                1 MCP                                                       Neuro Re-Ed                       HP/pulsed biph 15 15 15 15 15 15 15  15  15  15                                                   Ther Ex                       TGE 5x 5x 5x 5x 5x 5x 5x  5x  5x  5x   rubberband abd  2/5 2/10  2/5  2/5 2/5    2/5 2/5  2/5  2/5  2/5   TP   R 3'  R 3' Y/R 2' R 2' R 2' R 2' R 2'  R 3'  R 3'  R 3'   TP 5 finger R 3' R 3'  Y/R 3' R 2' R 2' R 2' R 2'  R 3' R 3'  R 3'   Gaudencio 50 2/10 50 2/10  35 2/10 35 2/10 50 2/10 50 2/10 50 2/10  50 2/10  50 2/10  50 2/10   Blue  VI 2/10  VI 2/10  V 2/10 V 2/10 VI 2/10 VI 2/10 VI 2/10  VI 2/10  VI 2/10  VI 2/10   Flexbar  B 2/10  B 2/10  G 2/10 G  G 2/10 B 2/10 B 2/10  B   B 20/20  B 2/10   DB E/F  7 2/10  7 2/10  6 2/10 6 2/10 6 2/10 6 2/10 7 2/10  7 2/10  7 2/10  7 2/10        S/P  3 2/10  3 2/10  2 2/10 2 2/10 3 2/10 3 2/10 3 2x10  3 2/10  3 2/10  3 2/10   SA row P5 1/10 See mid row NP  P3 2/10  P3 2/10  P3 2/10  P4 2/10  P5 2/10  P5 2/10  P5 2/10   Upright row P5 2/10 P5 2/10 P3 2/10  P3 2/10  P3 2/10  P3 2/10  P4 2/10  P5 2/10  P5 2/10  P5 2/10   Mid Row-2 handles  P5 2/10 5 2/10 4 2/10  P4 2/10  P4 2/10  P4 2/10  P4 2/10  P5 2/10   P5 2/10  P5 2/10   Mid Row Pro/Sup  P5 2/10 5 1x10 ea 4 1x10  P4 1/10  P4 2/10  P4 2/10  P4 2/10  P5 2/10  P5 2/10  P5 2/10                                                                            Comp  R 3'  Y3'  R 3'  R3'  R3'  R3  R3' R 3' R 3'  R 3'                                                   Modalities                       US DC 15 15 NP 15 15 DC PRN

## 2022-07-12 ENCOUNTER — OFFICE VISIT (OUTPATIENT)
Dept: PHYSICAL THERAPY | Facility: CLINIC | Age: 37
End: 2022-07-12
Payer: COMMERCIAL

## 2022-07-12 DIAGNOSIS — S62.392D CLOSED NONDISPLACED FRACTURE OF OTHER PART OF THIRD METACARPAL BONE OF RIGHT HAND WITH ROUTINE HEALING, SUBSEQUENT ENCOUNTER: Primary | ICD-10-CM

## 2022-07-12 PROCEDURE — 97110 THERAPEUTIC EXERCISES: CPT

## 2022-07-12 PROCEDURE — 97140 MANUAL THERAPY 1/> REGIONS: CPT

## 2022-07-12 PROCEDURE — 97112 NEUROMUSCULAR REEDUCATION: CPT

## 2022-07-12 NOTE — PROGRESS NOTES
Daily Note     Today's date: 2022  Patient name: Noland Koyanagi  : 1985  MRN: [id number]  Referring provider: Angelica Haskins MD  Dx:   Encounter Diagnosis     ICD-10-CM    1  Closed nondisplaced fracture of other part of third metacarpal bone of right hand with routine healing, subsequent encounter  S63 [id number]                   Subjective: Pt reports his hand is "coming along" Still tight in my knuckle but feels relief post therapy"      Objective: See treatment diary below    Objective: See treatment diary below  Wrist/Hand Comments  AROM right wrist E/F- 60/75                      MF MP- 0/95; PIP- 0/100; DIP- 0/80                      RF MP- 0/90; PIP- 0/100; DIP- 0/80                      SF MP- 0/100 PIP- 0/105 DIP-0/75  Inspection- moderate swelling 3rd, 4th MCP  Strength-  II- 80/120; 3 YULIA- 16/20; Kaur- 20/20  Circumference at MCP- R/L- 20  5 cm; MF- P1- 6 5 cm; RF P1- 6 2 cm  Palpation- tenderness at MF/RF MCP    Assessment: Tolerated treatment well today  Pt displaying good strength gains and gets relief post session  Plan: Progress treatment as tolerated         Precautions: avoid heavy grasp until cleared in therapy    Manuals    STM 15 15 15 15 DEF 15 15  15  15  15   Flexion strap                1 MCP                                                       Neuro Re-Ed                       HP/pulsed biph 15 15 15 15 15 15 15  15  15  15                                                   Ther Ex                       TGE 5x 5x 5x 5x 5x 5x 5x  5x  5x  5x   rubberband abd  25 2/10  25  25 25    25 2  2   TP   R 3'  R 3' R 2' R 2' R 2' R 2' R 2'  R 3'  R 3'  R 3'   TP 5 finger R 3' R 3'  /R 3' R 2' R 2' R 2' R 2'  R 3' R 3'  R 3'   Gaudencio 50 2/10 50 2/10 50 2/10 35 2/10 50 2/10 50 2/10 50 2/10  50 2/10  50 2/10  50 2/10   Blue  VI 2/10  VI /10  VI /10 V /10 VI /10 VI /10 VI /10  VI /10  VI 2/10  VI 2/10   Flexbar  B 2/10  B 2/10  B 2/10 G 20/20 G 2/10 B 2/10 B 2/10  B 20/20  B 20/20  B 2/10   DB E/F  7 2/10  7 2/10  8 2/10 6 2/10 6 2/10 6 2/10 7 2/10  7 2/10  7 2/10  7 2/10        S/P  3 2/10  3 2/10  3 2/10 2 2/10 3 2/10 3 2/10 3 2x10  3 2/10  3 2/10  3 2/10   SA row P5 1/10 P5 2/10 P3 2/10  P3 2/10  P3 2/10  P3 2/10  P4 2/10  P5 2/10  P5 2/10  P5 2/10   Upright row P5 2/10 P5 2/10 P5 2/10  P3 2/10  P3 2/10  P3 2/10  P4 2/10  P5 2/10  P5 2/10  P5 2/10   Mid Row-2 handles  P5 2/10 5 2/10 4 2/10  P4 2/10  P4 2/10  P4 2/10  P4 2/10  P5 2/10   P5 2/10  P5 2/10   Mid Row Pro/Sup  P5 2/10 5 1x10 ea 4 1x10  P4 1/10  P4 2/10  P4 2/10  P4 2/10  P5 2/10  P5 2/10  P5 2/10                                                                            Comp  R 3'  R 3'  R 3'  R3'  R3'  R3  R3' R 3' R 3'  R 3'                                                   Modalities                       US DC DC 15 NP 15 15 DC PRN

## 2022-07-14 ENCOUNTER — APPOINTMENT (OUTPATIENT)
Dept: PHYSICAL THERAPY | Facility: CLINIC | Age: 37
End: 2022-07-14
Payer: COMMERCIAL

## 2022-07-15 ENCOUNTER — OFFICE VISIT (OUTPATIENT)
Dept: PHYSICAL THERAPY | Facility: CLINIC | Age: 37
End: 2022-07-15
Payer: COMMERCIAL

## 2022-07-15 DIAGNOSIS — S62.392D CLOSED NONDISPLACED FRACTURE OF OTHER PART OF THIRD METACARPAL BONE OF RIGHT HAND WITH ROUTINE HEALING, SUBSEQUENT ENCOUNTER: Primary | ICD-10-CM

## 2022-07-15 PROCEDURE — 97140 MANUAL THERAPY 1/> REGIONS: CPT

## 2022-07-15 PROCEDURE — 97112 NEUROMUSCULAR REEDUCATION: CPT

## 2022-07-15 PROCEDURE — 97035 APP MDLTY 1+ULTRASOUND EA 15: CPT

## 2022-07-15 PROCEDURE — 97110 THERAPEUTIC EXERCISES: CPT

## 2022-07-15 NOTE — PROGRESS NOTES
Daily Note     Today's date: 7/15/2022  Patient name: Aga Hilton  : 1985  MRN: [id number]  Referring provider: Martha Menard MD  Dx:   Encounter Diagnosis     ICD-10-CM    1  Closed nondisplaced fracture of other part of third metacarpal bone of right hand with routine healing, subsequent encounter  M13 [id number]                   Subjective: Pt reports his hand is sore but a "good sore but my back might prohibit me from doing the gym activities "      Objective: See treatment diary below    Objective: See treatment diary below  Wrist/Hand Comments  AROM right wrist E/F- 60/75                      MF MP- 0/95; PIP- 0/100; DIP- 0/80                      RF MP- 0/90; PIP- 0/100; DIP- 0/80                      SF MP- 0/100 PIP- 0/105 DIP-0/75  Inspection- moderate swelling 3rd, 4th MCP  Strength-  II- 80/120; 3 YULIA- 16/20; Kaur- 20/20  Circumference at MCP- R/L- 20  5 cm; MF- P1- 6 5 cm; RF P1- 6 2 cm  Palpation- tenderness at MF/RF MCP    Assessment: Tolerated treatment well today  Pt deferred gym activity due to L/S pain  Plan: Progress treatment as tolerated         Precautions: avoid heavy grasp until cleared in therapy    Manuals 7/5 7/8 7/12  7/15  6/14  6/17  6/21  6/23  6/29  7/8   STM 15 15 15 15 DEF 15 15  15  15  15   Flexion strap                1 MCP                                                       Neuro Re-Ed                       HP/pulsed biph 15 15 15 15 15 15 15  15  15  15                                                   Ther Ex                       TGE 5x 5x 5x 5x 5x 5x 5x  5x  5x  5x   rubberband abd  2/5 2/10  25  25 25    25 2  2  2   TP   R 3'  R 3' R 2' R 2' R 2' R 2' R 2'  R 3'  R 3'  R 3'   TP 5 finger R 3' R 3'  /R 3' R 2' R 2' R 2' R 2'  R 3' R 3'  R 3'   Gaudencio 50 2/10 50 2/10 50 2/10 50 2/10 50 2/10 50 2/10 50 2/10  50 2/10  50 2/10  50 2/10   Blue  VI 2/10  VI 2/10  VI 2/10 VI 2/10 VI 2/10 VI 2/10 VI 2/10  VI 2/10  VI 2/10  VI 2/10   Flexbar  B 2/10  B 2/10  B 2/10 B 20/20 G 2/10 B 2/10 B 2/10  B 20/20  B 20/20  B 2/10   DB E/F  7 2/10  7 2/10  8 2/10 8 2/10 6 2/10 6 2/10 7 2/10  7 2/10  7 2/10  7 2/10        S/P  3 2/10  3 2/10  3 2/10 2 2/10 3 2/10 3 2/10 3 2x10  3 2/10  3 2/10  3 2/10   SA row P5 1/10 P5 2/10 P3 2/10  Def all below  P3 2/10  P3 2/10  P4 2/10  P5 2/10  P5 2/10  P5 2/10   Upright row P5 2/10 P5 2/10 P5 2/10   P3 2/10  P3 2/10  P4 2/10  P5 2/10  P5 2/10  P5 2/10   Mid Row-2 handles  P5 2/10 5 2/10 4 2/10  P4 2/10  P4 2/10  P4 2/10  P4 2/10  P5 2/10   P5 2/10  P5 2/10   Mid Row Pro/Sup  P5 2/10 5 1x10 ea 4 1x10  P4 1/10  P4 2/10  P4 2/10  P4 2/10  P5 2/10  P5 2/10  P5 2/10                                                                            Comp  R 3'  R 3'  R 3'  R3'  R3'  R3  R3' R 3' R 3'  R 3'                                                   Modalities                       US DC DC 15 NP 15 15 DC PRN

## 2022-07-19 ENCOUNTER — OFFICE VISIT (OUTPATIENT)
Dept: PHYSICAL THERAPY | Facility: CLINIC | Age: 37
End: 2022-07-19
Payer: COMMERCIAL

## 2022-07-19 DIAGNOSIS — S62.392D CLOSED NONDISPLACED FRACTURE OF OTHER PART OF THIRD METACARPAL BONE OF RIGHT HAND WITH ROUTINE HEALING, SUBSEQUENT ENCOUNTER: Primary | ICD-10-CM

## 2022-07-19 PROCEDURE — 97140 MANUAL THERAPY 1/> REGIONS: CPT

## 2022-07-19 PROCEDURE — 97112 NEUROMUSCULAR REEDUCATION: CPT

## 2022-07-19 PROCEDURE — 97110 THERAPEUTIC EXERCISES: CPT

## 2022-07-19 NOTE — PROGRESS NOTES
Daily Note     Today's date: 2022  Patient name: Simone Hoffman  : 1985  MRN: [id number]  Referring provider: Radha Donato MD  Dx:   Encounter Diagnosis     ICD-10-CM    1  Closed nondisplaced fracture of other part of third metacarpal bone of right hand with routine healing, subsequent encounter  S63 489D                   Subjective: Pt reports his RF MP feels relief with PROM  Pt states he is getting stronger but still feels like he should pop his knuckles  Objective: See treatment diary below  Objective: See treatment diary below  Wrist/Hand Comments  AROM right wrist E/F- 60/75                      MF MP- 0/95; PIP- 0/100; DIP- 0/80                      RF MP- 0/90; PIP- 0/100; DIP- 0/80                      SF MP- 0/100 PIP- 0/105 DIP-0/75  Inspection- moderate swelling 3rd, 4th MCP  Strength-  II- 80/120; 3 YULIA- 16/20; Kaur- 20/20  Circumference at MCP- R/L- 20  5 cm; MF- P1- 6 5 cm; RF P1- 6 2 cm  Palpation- tenderness at MF/RF MCP      Assessment: Tolerated treatment well today  Pt deferred certain standing exercises due to L/S pain  Plan: Progress treatment as tolerated         Precautions: avoid heavy grasp until cleared in therapy    Manuals 7/5 7/8 7/12  7/15  7/19  6/17  6/21  6/23  6/29  7/8   STM 15 15 15 15 15 15 15  15  15  15   Flexion strap                1 MCP                                                       Neuro Re-Ed                       HP/pulsed biph 15 15 15 15 15 15 15  15  15  15                                                   Ther Ex                       TGE 5x 5x 5x 5x 5x 5x 5x  5x  5x  5x   rubberband abd  2/5 2/10  2/5  2/5 2/5   2/5 2/5 2/5  2/5  2/5  2/5   TP   R 3'  R 3' R 2' R 2' R 2' R 2' R 2'  R 3'  R 3'  R 3'   TP 5 finger R 3' R 3'  /R 3' R 2' R 2' R 2' R 2'  R 3' R 3'  R 3'   Gaudencio 50 2/10 50 2/10 50 2/10 50 2/10 50 2/10 50 2/10 50 2/10  50 2/10  50 2/10  50 2/10   Blue  VI 2/10  VI 2/10  VI 2/10 VI 2/10 L IV 2/10 VI 2/10 VI 2/10  VI 2/10  VI 2/10  VI 2/10   Flexbar  B 2/10  B 2/10  B 2/10 B 20/20 B 2/10 B 2/10 B 2/10  B 20/20  B 20/20  B 2/10   DB E/F  7 2/10  7 2/10  8 2/10 8 2/10 8 2/10 6 2/10 7 2/10  7 2/10  7 2/10  7 2/10        S/P  3 2/10  3 2/10  3 2/10 2 2/10 3 2/10 3 2/10 3 2x10  3 2/10  3 2/10  3 2/10   SA row P5 1/10 P5 2/10 P3 2/10  Def all below  DEF  P3 2/10  P4 2/10  P5 2/10  P5 2/10  P5 2/10   Upright row P5 2/10 P5 2/10 P5 2/10  def  P3 2/10  P4 2/10  P5 2/10  P5 2/10  P5 2/10   Mid Row-2 handles  P5 2/10 5 2/10 4 2/10  P4 2/10  P4 2/10  P4 2/10  P4 2/10  P5 2/10   P5 2/10  P5 2/10   Mid Row Pro/Sup  P5 2/10 5 1x10 ea 4 1x10  P4 1/10  P4 2/10  P4 2/10  P4 2/10  P5 2/10  P5 2/10  P5 2/10                                                                            Comp  R 3'  R 3'  R 3'  R3'  R3'  R3  R3' R 3' R 3'  R 3'                                                   Modalities                       US DC DC 15 NP DC PRN 15 DC PRN

## 2022-07-21 ENCOUNTER — APPOINTMENT (OUTPATIENT)
Dept: PHYSICAL THERAPY | Facility: CLINIC | Age: 37
End: 2022-07-21
Payer: COMMERCIAL

## 2022-07-25 ENCOUNTER — OFFICE VISIT (OUTPATIENT)
Dept: PHYSICAL THERAPY | Facility: CLINIC | Age: 37
End: 2022-07-25
Payer: COMMERCIAL

## 2022-07-25 DIAGNOSIS — S62.392D CLOSED NONDISPLACED FRACTURE OF OTHER PART OF THIRD METACARPAL BONE OF RIGHT HAND WITH ROUTINE HEALING, SUBSEQUENT ENCOUNTER: Primary | ICD-10-CM

## 2022-07-25 PROCEDURE — 97110 THERAPEUTIC EXERCISES: CPT

## 2022-07-25 PROCEDURE — 97140 MANUAL THERAPY 1/> REGIONS: CPT

## 2022-07-25 PROCEDURE — 97112 NEUROMUSCULAR REEDUCATION: CPT

## 2022-07-25 PROCEDURE — 97535 SELF CARE MNGMENT TRAINING: CPT

## 2022-07-25 NOTE — PROGRESS NOTES
PT Discharge    Today's date: 2022  Patient name: Oneil Diallo  : 1985  MRN: [id number]  Referring provider: Lia Borja MD  Dx:   Encounter Diagnosis     ICD-10-CM    1  Closed nondisplaced fracture of other part of third metacarpal bone of right hand with routine healing, subsequent encounter  S62 392D        Start Time: 0900  Stop Time: 1015  Total time in clinic (min): 75 minutes    Assessment  Assessment details: Pt is a 38 YO male presenting to PT with pain, decreased AROM, strength and tolerance to activity  Pt would benefit from skilled intervention to address these issues and maximize overall function  Occupation- not working  Dominant- right; Involved- right  Pt notes improvement with AROM and strength, but notes limitations in function, especially with tight and heavy grasp  He feels his LF, RF and SF MCP remain tight and restrictive  Pain continues at the RF MCP with composite flexion  Pt will cont on a home exercise program and be DC      Goals  ST  Decrease pain to 0-2/10 in 4 weeks            2  Decrease swelling right hand in 6-8 weeks            3  Increase AROM to [employer] in 6-8 weeks            4   Provide orthotic for protection, compression for support  LT  Increase functional motion and strength for independence with ADL and self care by DC            2   Ability to RTW and recreational activity by DC  Goals met    Plan  Patient would benefit from: skilled physical therapy  Planned modality interventions: thermotherapy: hydrocollator packs, ultrasound and cryotherapy  Planned therapy interventions: activity modification, neuromuscular re-education, strengthening, stretching, therapeutic activities, therapeutic exercise and home exercise program  Frequency: 2x week  Duration in weeks: 4  Treatment plan discussed with: patient        Subjective Evaluation    History of Present Illness  Date of onset: 2022  Mechanism of injury: Pt tripped on a broken step, injuring his ankle  Pt became angry and punched a wall, fracturing his right 3rd MC  Pt was diagnosed with a closed non-displaced fracture of his right 2rd MC and was casted for 5 weeks until 3/18/2022      Pain  Current pain ratin  At best pain ratin  At worst pain ratin  Location: right RF> MF MCP  Quality: sharp and knife-like    Hand dominance: right    Treatments  Current treatment: physical therapy  Patient Goals  Patient goals for therapy: decreased edema, decreased pain, increased motion, increased strength, independence with ADLs/IADLs, return to sport/leisure activities and return to work          Objective     General Comments:      Wrist/Hand Comments  AROM right wrist E/F- 60/75                      MF MP- 0/95; PIP- 0/100; DIP- 0/80                      RF MP- 0/90; PIP- 0/100; DIP- 0/80                      SF MP- 0/100 PIP- 0/105 DIP-0/75  Inspection- moderate swelling 3rd, 4th MCP  Strength-  II- 88/120; 3 YULIA- ; Kaur-   Circumference at MCP- R/L-  5 cm; MF- P1- 6 5 cm; RF P1- 6 2 cm  Palpation- tenderness at MF/RF MCP             Precautions: avoid heavy grasp until cleared in therapy    Manuals 7/5 7/8 7/12  7/15  7/19  7/25  6/21  6/23  6/29  7/8   STM 15 15 15 15 15 15 15  15  15  15   Flexion strap            15    1 MCP                                                       Neuro Re-Ed                       HP/pulsed biph 15 15 15 15 15 15 15  15  15  15                                                   Ther Ex                       TGE 5x 5x 5x 5x 5x 5x 5x  5x  5x  5x   rubberband abd  2/5 2/10  2/5  2/5 2/5   2/5 2/5 2/5  2/5  2/5  2/5   TP   R 3'  R 3' R 2' R 2' R 2' R 2' R 2'  R 3'  R 3'  R 3'   TP 5 finger R 3' R 3'  /R 3' R 2' R 2' R 2' R 2'  R 3' R 3'  R 3'   Gaudencio 50 2/10 50 2/10 50 2/10 50 2/10 50 2/10 50 2/10 50 2/10  50 2/10  50 2/10  50 2/10   Blue  VI 2/10  VI 2/10  VI 2/10 VI 2/10 L IV 2/10 VI 2/10 VI 2/10  VI 2/10  VI 2/10  VI 2/10   Flexbar  B 2/10  B 2/10  B 2/10 B 20/20 B 2/10 B 2/10 B 2/10  B 20/20  B 20/20  B 2/10   DB E/F  7 2/10  7 2/10  8 2/10 8 2/10 8 2/10 9 2/10 7 2/10  7 2/10  7 2/10  7 2/10        S/P  3 2/10  3 2/10  3 2/10 2 2/10 3 2/10 3 2/10 3 2x10  3 2/10  3 2/10  3 2/10   SA row P5 1/10 P5 2/10 P3 2/10  Def all below  DEF  DEF below LS pain  P4 2/10  P5 2/10  P5 2/10  P5 2/10   Upright row P5 2/10 P5 2/10 P5 2/10   def  DEF  P4 2/10  P5 2/10  P5 2/10  P5 2/10   Mid Row-2 handles  P5 2/10 5 2/10 4 2/10  P4 2/10  P4 2/10  DEF  P4 2/10  P5 2/10   P5 2/10  P5 2/10   Mid Row Pro/Sup  P5 2/10 5 1x10 ea 4 1x10  P4 1/10  P4 2/10 DEF  P4 2/10  P5 2/10  P5 2/10  P5 2/10                                                                            Comp  R 3'  R 3'  R 3'  R3'  R3'  R3  R3' R 3' R 3'  R 3'                                                   Modalities                       US DC DC 15 NP DC PRN 15 DC PRN

## 2022-07-25 NOTE — PROGRESS NOTES
Daily Note     Today's date: 2022  Patient name: Sosa Pacheco  : 1985  MRN: [id number]  Referring provider: López Meredith MD  Dx:   Encounter Diagnosis     ICD-10-CM    1  Closed nondisplaced fracture of other part of third metacarpal bone of right hand with routine healing, subsequent encounter  C08 410U                   Subjective: Pt reports improved symptoms with his hand  "I am much better than I started with strength and function"      Objective: See treatment diary below  Wrist/Hand Comments  AROM right wrist E/F- 60/75                      MF MP- 0/95; PIP- 0/100; DIP- 0/80                      RF MP- 0/90; PIP- 0/100; DIP- 0/80                      SF MP- 0/100 PIP- 0/105 DIP-0/75  Inspection- moderate swelling 3rd, 4th MCP  Strength-  II- 88/120; 3 YULIA- ; Kaur-   Circumference at MCP- R/L-  5 cm; MF- P1- 6 5 cm; RF P1- 6 2 cm  Palpation- tenderness at MF/RF MCP      Assessment: Tolerated treatment well today  Pt to transition to HEP as goals have been met  Pt to follow through with HEP  Plan: Progress treatment as tolerated         Precautions: avoid heavy grasp until cleared in therapy    Manuals 7/5 7/8 7/12  7/15  7/19  7/25  6/21  6/23  6/29  7/8   STM 15 15 15 15 15 15 15  15  15  15   Flexion strap            15    1 MCP                                                       Neuro Re-Ed                       HP/pulsed biph 15 15 15 15 15 15 15  15  15  15                                                   Ther Ex                       TGE 5x 5x 5x 5x 5x 5x 5x  5x  5x  5x   rubberband abd  5 2/10  2/5  25 2   2 2   TP   R 3'  R 3' R 2' R 2' R 2' R 2' R 2'  R 3'  R 3'  R 3'   TP 5 finger R 3' R 3'  /R 3' R 2' R 2' R 2' R 2'  R 3' R 3'  R 3'   Gaudencio 50 2/10 50 2/10 50 2/10 50 2/10 50 2/10 50 2/10 50 2/10  50 2/10  50 2/10  50 2/10   Blue  VI 2/10  VI 2/10  VI 2/10 VI 2/10 L IV 2/10 VI 2/10 VI 2/10  VI 2/10  VI 2/10  VI 2/10   Flexbar  B 2/10  B 2/10  B 2/10 B 20/20 B 2/10 B 2/10 B 2/10  B 20/20  B 20/20  B 2/10   DB E/F  7 2/10  7 2/10  8 2/10 8 2/10 8 2/10 9 2/10 7 2/10  7 2/10  7 2/10  7 2/10        S/P  3 2/10  3 2/10  3 2/10 2 2/10 3 2/10 3 2/10 3 2x10  3 2/10  3 2/10  3 2/10   SA row P5 1/10 P5 2/10 P3 2/10  Def all below  DEF  DEF below LS pain  P4 2/10  P5 2/10  P5 2/10  P5 2/10   Upright row P5 2/10 P5 2/10 P5 2/10  def  DEF  P4 2/10  P5 2/10  P5 2/10  P5 2/10   Mid Row-2 handles  P5 2/10 5 2/10 4 2/10  P4 2/10  P4 2/10  DEF  P4 2/10  P5 2/10   P5 2/10  P5 2/10   Mid Row Pro/Sup  P5 2/10 5 1x10 ea 4 1x10  P4 1/10  P4 2/10 DEF  P4 2/10  P5 2/10  P5 2/10  P5 2/10                                                                            Comp  R 3'  R 3'  R 3'  R3'  R3'  R3  R3' R 3' R 3'  R 3'                                                   Modalities                       US DC DC 15 NP DC PRN 15 DC PRN

## 2022-07-26 ENCOUNTER — APPOINTMENT (OUTPATIENT)
Dept: PHYSICAL THERAPY | Facility: CLINIC | Age: 37
End: 2022-07-26
Payer: COMMERCIAL

## 2022-07-28 ENCOUNTER — APPOINTMENT (OUTPATIENT)
Dept: PHYSICAL THERAPY | Facility: CLINIC | Age: 37
End: 2022-07-28
Payer: COMMERCIAL

## 2022-08-11 ENCOUNTER — APPOINTMENT (OUTPATIENT)
Dept: PHYSICAL THERAPY | Facility: CLINIC | Age: 37
End: 2022-08-11
Payer: COMMERCIAL

## 2022-08-11 ENCOUNTER — EVALUATION (OUTPATIENT)
Dept: PHYSICAL THERAPY | Facility: CLINIC | Age: 37
End: 2022-08-11
Payer: COMMERCIAL

## 2022-08-11 DIAGNOSIS — M40.209 KYPHOSIS, UNSPECIFIED KYPHOSIS TYPE, UNSPECIFIED SPINAL REGION: Primary | ICD-10-CM

## 2022-08-11 PROCEDURE — 97535 SELF CARE MNGMENT TRAINING: CPT | Performed by: PHYSICAL THERAPIST

## 2022-08-11 PROCEDURE — 97161 PT EVAL LOW COMPLEX 20 MIN: CPT | Performed by: PHYSICAL THERAPIST

## 2022-08-11 PROCEDURE — 97112 NEUROMUSCULAR REEDUCATION: CPT | Performed by: PHYSICAL THERAPIST

## 2022-08-11 NOTE — LETTER
----- Message from Chandni Brand sent at 4/20/2018  1:28 PM CDT -----  Contact: [facility]  Pharmacy is having trouble with pt insurance and Immunosuppression    Contact number [phone]  Thanks    2022    MD Johnson Echeverria [facility]   [address]    Patient: Sherman Pedro   YOB: 1985   Date of Visit: 2022     Encounter Diagnosis     ICD-10-CM    1  Thoracic compression fracture, with delayed healing, subsequent encounter  S22 000G    2  Kyphosis, unspecified kyphosis type, unspecified spinal region  M40 209        Dear Dr Deejay Trujillo:    Thank you for your recent referral of Sherman Pedro  Please review the attached evaluation summary from Pj's recent visit  Please verify that you agree with the plan of care by signing the attached order  If you have any questions or concerns, please do not hesitate to call  I sincerely appreciate the opportunity to share in the care of one of your patients and hope to have another opportunity to work with you in the near future  Sincerely,    Leopoldo Ing, PT, DPT, ATC, ART      Referring Provider:      I certify that I have read the below Plan of Care and certify the need for these services furnished under this plan of treatment while under my care                      MD Johnson Echeverria [address]  [address] [facility] [address]  Via Fax: [phone]          PT Evaluation     Today's date: 2022  Patient name: Sherman Pedro  : 1985  MRN: [id number]  Referring provider: David Pike MD  Dx:   Encounter Diagnosis     ICD-10-CM    1  Closed nondisplaced fracture of other part of third metacarpal bone of right hand with routine healing, subsequent encounter  S62 392D                   Assessment  Understanding of Dx/Px/POC: good   Prognosis: good    Goals  STGs: To be complete within 4 weeks  - Decrease pain to < 2/10 at worst  - Increase Thoracic ROM to WNL  - Increase postural strength and core stability and posterior lateral LE chain strength to > 4+/5  - Improve postural awareness capacity to > 60min before deficit    LTGs: To be complete within 6 weeks  - Able to sit for any extended amount of time without pain or limitation for increased safety and functional capacity with ADLs and work-related duty  - Able to repetitively bend over at trunk without pain or limitation for increased safety and functional capacity with ADLs and and work-related duty  - Able to complete all lifting/carrying activity without pain or limitation for increased safety and functional capacity with ADLs and work-related duty  - Able to complete transfers repetitively without pain or limitation for increased safety and functional capacity with ADLs and work-related duty    Plan  Planned therapy interventions: manual therapy, neuromuscular re-education, patient education, self care, therapeutic activities, therapeutic exercise and home exercise program  Frequency: 2x week  Duration in weeks: 6       Pt is a 39 y o  male with chronic T-Spine pain who presents with functional deficits including decreased capacity with sitting, lifting/carrying, transfers, and bending over at the trunk  Upon completion of today's initial evaluation, Pj's sx remain consistent with continued recovery and ongoing pain and limitation from acute compression fx episode in 2020  Pt will benefit from skilled physical therapy to address current deficits  Subjective Pt reports in 2020 he suffered a seizure that resulted in a bad fall resulting in compression fractures of T6-T10 vertebrae  Pt reports ever since since then his back pain and sx have continued to negatively effect his overall safety and functional capacity with ADLs and work-related duty          Objective Pain level ranges 2-8/10  AROM: T-spine Rot 50% decreased bilaterally; T-spine flexion and extension 50% decreased  Strength: Cervical, Thoracic, and Postural 3+/5  Postural Awareness: Poor (rounded shoulders, forward head)  FOTO: 44; GOAL: 56  Unable sit without pain and limitation  Unable to rotate trunk R or L without pain and limitation  Unable to complete lifting/carrying activity without pain and limitation  Unable to complete transfers without pain and limitation             Precautions: PMHx of T-spine compression fx T6 - T10    Daily Treatment Diary    HEP: Handout provided and discussed      Manuals 8/11/22            ART             PROM/Stretch             IASTM             STM/Triggerpoint             JM                          Neuro Re-Ed             Prone Scap retract 2x10            SL T-spine Rot each side 10x ea            On Elbows neck extension PROM with fists under chin 4x30''            Prone on elbows neck retraction  Next session           Kneeling T-spine Rot    This session                                   Ther Ex             TB Rows  Next session           TB ER  Next session           TB Ts and YS  Next session                                                                                         Ther Activity                                       Gait Training                                       Modalities             MHP  Start with next session prone face cradle           CP Prone face cradle x10'            US/Stim

## 2022-08-11 NOTE — PROGRESS NOTES
PT Evaluation     Today's date: 2022  Patient name: Alice Matthews  : 1985  MRN: [id number]  Referring provider: Bruna Dubon MD  Dx:   Encounter Diagnosis     ICD-10-CM    1  Kyphosis, unspecified kyphosis type, unspecified spinal region  M40 209                   Assessment  Understanding of Dx/Px/POC: good   Prognosis: good    Goals  STGs: To be complete within 4 weeks  - Decrease pain to < 2/10 at worst  - Increase Thoracic ROM to WNL  - Increase postural strength and core stability and posterior lateral LE chain strength to > 4+/5  - Improve postural awareness capacity to > 60min before deficit    LTGs: To be complete within 6 weeks  - Able to sit for any extended amount of time without pain or limitation for increased safety and functional capacity with ADLs and work-related duty  - Able to repetitively bend over at trunk without pain or limitation for increased safety and functional capacity with ADLs and and work-related duty  - Able to complete all lifting/carrying activity without pain or limitation for increased safety and functional capacity with ADLs and work-related duty  - Able to complete transfers repetitively without pain or limitation for increased safety and functional capacity with ADLs and work-related duty    Plan  Planned therapy interventions: manual therapy, neuromuscular re-education, patient education, self care, therapeutic activities, therapeutic exercise and home exercise program  Frequency: 2x week  Duration in weeks: 6       Pt is a 39 y o  male with chronic T-Spine pain who presents with functional deficits including decreased capacity with sitting, lifting/carrying, transfers, and bending over at the trunk  Upon completion of today's initial evaluation, Pj's sx remain consistent with continued recovery and ongoing pain and limitation from acute compression fx episode in    Pt will benefit from skilled physical therapy to address current deficits  Subjective Pt reports in 2020 he suffered a seizure that resulted in a bad fall resulting in compression fractures of T6-T10 vertebrae  Pt reports ever since since then his back pain and sx have continued to negatively effect his overall safety and functional capacity with ADLs and work-related duty          Objective Pain level ranges 2-8/10  AROM: T-spine Rot 50% decreased bilaterally; T-spine flexion and extension 50% decreased  Strength: Cervical, Thoracic, and Postural 3+/5  Postural Awareness: Poor (rounded shoulders, forward head)  FOTO: 44; GOAL: 56  Unable sit without pain and limitation  Unable to rotate trunk R or L without pain and limitation  Unable to complete lifting/carrying activity without pain and limitation  Unable to complete transfers without pain and limitation             Precautions: PMHx of T-spine compression fx T6 - T10    Daily Treatment Diary    HEP: Handout provided and discussed      Manuals 8/11/22            ART             PROM/Stretch             IASTM             STM/Triggerpoint             JM                          Neuro Re-Ed             Prone Scap retract 2x10            SL T-spine Rot each side 10x ea            On Elbows neck extension PROM with fists under chin 4x30''            Prone on elbows neck retraction  Next session           Kneeling T-spine Rot    This session                                   Ther Ex             TB Rows  Next session           TB ER  Next session           TB Ts and YS  Next session                                                                                         Ther Activity                                       Gait Training                                       Modalities             MHP  Start with next session prone face cradle           CP Prone face cradle x10'            US/Stim

## 2022-08-15 ENCOUNTER — APPOINTMENT (OUTPATIENT)
Dept: PHYSICAL THERAPY | Facility: CLINIC | Age: 37
End: 2022-08-15
Payer: COMMERCIAL

## 2022-08-16 ENCOUNTER — APPOINTMENT (OUTPATIENT)
Dept: PHYSICAL THERAPY | Facility: CLINIC | Age: 37
End: 2022-08-16
Payer: COMMERCIAL

## 2022-08-18 ENCOUNTER — OFFICE VISIT (OUTPATIENT)
Dept: PHYSICAL THERAPY | Facility: CLINIC | Age: 37
End: 2022-08-18
Payer: COMMERCIAL

## 2022-08-18 DIAGNOSIS — M40.209 KYPHOSIS, UNSPECIFIED KYPHOSIS TYPE, UNSPECIFIED SPINAL REGION: Primary | ICD-10-CM

## 2022-08-18 DIAGNOSIS — S62.392D CLOSED NONDISPLACED FRACTURE OF OTHER PART OF THIRD METACARPAL BONE OF RIGHT HAND WITH ROUTINE HEALING, SUBSEQUENT ENCOUNTER: ICD-10-CM

## 2022-08-18 PROCEDURE — 97140 MANUAL THERAPY 1/> REGIONS: CPT

## 2022-08-18 PROCEDURE — 97110 THERAPEUTIC EXERCISES: CPT

## 2022-08-18 NOTE — PROGRESS NOTES
Daily Note     Today's date: 2022  Patient name: Sosa Pacheco  : 1985  MRN: [id number]  Referring provider: Nazia Culp, PT  Dx:   Encounter Diagnosis     ICD-10-CM    1  Kyphosis, unspecified kyphosis type, unspecified spinal region  M40 209    2  Closed nondisplaced fracture of other part of third metacarpal bone of right hand with routine healing, subsequent encounter  S62 495D                   Subjective: pt reports mostly tightness along TS today  Objective: See treatment diary below      Assessment: Tolerated treatment well  Patient exhibited good technique with therapeutic exercises  Pt ale program additions today well with primarily c/o tightness  Pt with mm spasm along B paraspinals in TS  Plan: Continue per plan of care        Precautions: PMHx of T-spine compression fx T6 - T10    Daily Treatment Diary    HEP: Handout provided and discussed      Manuals 22           ART             PROM/Stretch             IASTM             STM/Triggerpoint  10'           JM                          Neuro Re-Ed             Prone Scap retract 2x10 2/10           SL T-spine Rot each side 10x ea 10x ea           On Elbows neck extension PROM with fists under chin 4x30'' 430"            Prone on elbows neck retraction  Next session           Kneeling T-spine Rot    This session                                   Ther Ex             TB Rows  L4 3/10           TB ER  L3 3/10 ea           TB Ts and YS  L2 2/10 ea                                                                                         Ther Activity                                       Gait Training                                       Modalities             MHP  10' prone face cradle           CP Prone face cradle x10'            US/Stim

## 2022-08-22 ENCOUNTER — OFFICE VISIT (OUTPATIENT)
Dept: PHYSICAL THERAPY | Facility: CLINIC | Age: 37
End: 2022-08-22
Payer: COMMERCIAL

## 2022-08-22 DIAGNOSIS — S62.392D CLOSED NONDISPLACED FRACTURE OF OTHER PART OF THIRD METACARPAL BONE OF RIGHT HAND WITH ROUTINE HEALING, SUBSEQUENT ENCOUNTER: ICD-10-CM

## 2022-08-22 DIAGNOSIS — M40.209 KYPHOSIS, UNSPECIFIED KYPHOSIS TYPE, UNSPECIFIED SPINAL REGION: Primary | ICD-10-CM

## 2022-08-22 PROCEDURE — 97110 THERAPEUTIC EXERCISES: CPT

## 2022-08-22 PROCEDURE — 97112 NEUROMUSCULAR REEDUCATION: CPT

## 2022-08-22 PROCEDURE — 97140 MANUAL THERAPY 1/> REGIONS: CPT

## 2022-08-22 NOTE — PROGRESS NOTES
Daily Note     Today's date: 2022  Patient name: Avi Gonzalez  : 1985  MRN: [id number]  Referring provider: Matt Clark, PT  Dx:   Encounter Diagnosis     ICD-10-CM    1  Kyphosis, unspecified kyphosis type, unspecified spinal region  M40 209    2  Closed nondisplaced fracture of other part of third metacarpal bone of right hand with routine healing, subsequent encounter  S62 315D                   Subjective: Pt reports cont tightness along TS  Objective: See treatment diary below      Assessment: Tolerated treatment well  Patient demonstrated fatigue post treatment  Pt ale additions well today with mild c/o pain  Pt with good overall mechanics  Cont to have tightness along B paraspinals along TS  Plan: Continue per plan of care        Precautions: PMHx of T-spine compression fx T6 - T10    Daily Treatment Diary    HEP: Handout provided and discussed      Manuals 22          ART             PROM/Stretch             IASTM             STM/Triggerpoint  10' 15'          JM                          Neuro Re-Ed             Prone Scap retract 2x10 2/10 2/10          SL T-spine Rot each side 10x ea 10x ea 10x ea          On Elbows neck extension PROM with fists under chin 4x30'' 4/30"  4/30"          Prone on elbows neck retraction  Next session 2/10          Kneeling T-spine Rot    This session                                   Ther Ex             TB Rows  L4 3/10 L4 3/10          TB ER  L3 3/10 ea L3 3/10 ea          TB Ts and YS  L2 2/10 ea L3 2/10 ea          No moneys   2/10 L2                                                                           Ther Activity                                       Gait Training                                       Modalities             MHP  10' prone face cradle 10' Prone face cradle          CP Prone face cradle x10'            US/Stim

## 2022-08-23 ENCOUNTER — APPOINTMENT (OUTPATIENT)
Dept: PHYSICAL THERAPY | Facility: CLINIC | Age: 37
End: 2022-08-23
Payer: COMMERCIAL

## 2022-08-25 ENCOUNTER — OFFICE VISIT (OUTPATIENT)
Dept: PHYSICAL THERAPY | Facility: CLINIC | Age: 37
End: 2022-08-25
Payer: COMMERCIAL

## 2022-08-25 DIAGNOSIS — M40.209 KYPHOSIS, UNSPECIFIED KYPHOSIS TYPE, UNSPECIFIED SPINAL REGION: Primary | ICD-10-CM

## 2022-08-25 DIAGNOSIS — S62.392D CLOSED NONDISPLACED FRACTURE OF OTHER PART OF THIRD METACARPAL BONE OF RIGHT HAND WITH ROUTINE HEALING, SUBSEQUENT ENCOUNTER: ICD-10-CM

## 2022-08-25 PROCEDURE — 97110 THERAPEUTIC EXERCISES: CPT

## 2022-08-25 PROCEDURE — 97140 MANUAL THERAPY 1/> REGIONS: CPT

## 2022-08-25 PROCEDURE — 97112 NEUROMUSCULAR REEDUCATION: CPT

## 2022-08-25 NOTE — PROGRESS NOTES
Daily Note     Today's date: 2022  Patient name: Kavon Payne  : 1985  MRN: [id number]  Referring provider: Layne Levy, PT  Dx:   Encounter Diagnosis     ICD-10-CM    1  Kyphosis, unspecified kyphosis type, unspecified spinal region  M40 209    2  Closed nondisplaced fracture of other part of third metacarpal bone of right hand with routine healing, subsequent encounter  S68 625D                   Subjective: Pt reports improved sx up to a few hours after PT  Objective: See treatment diary below      Assessment: Tolerated treatment well  Patient exhibited good technique with therapeutic exercises  Pt cont to ale program well with some increased discomfort in L rib area with SL rotation  Pt cont to have notable mm spasm along TS paraspinals  Plan: Continue per plan of care        Precautions: PMHx of T-spine compression fx T6 - T10    Daily Treatment Diary    HEP: Handout provided and discussed      Manuals 22         ART             PROM/Stretch             IASTM             STM/Triggerpoint  10' 15' 15'         JM                          Neuro Re-Ed             Prone Scap retract 2x10 2/10 2/10 2/10         SL T-spine Rot each side 10x ea 10x ea 10x ea 10x ea         On Elbows neck extension PROM with fists under chin 4x30'' 4/30"  4/30" 4/30"         Prone on elbows neck retraction  Next session 2/10 2/10         Kneeling T-spine Rot    This session                                   Ther Ex             TB Rows  L4 3/10 L4 3/10 L4 3/10         TB ER  L3 3/10 ea L3 3/10 ea L3 3/10 ea         TB Ts and YS  L2 2/10 ea L3 2/10 ea L3 2/10 ea         No moneys   2/10 L2 2/10 L2                                                                          Ther Activity                                       Gait Training                                       Modalities             MHP  10' prone face cradle 10' Prone face cradle 10' face cradle         CP Prone face cradle x10' US/Stim

## 2022-08-29 ENCOUNTER — APPOINTMENT (OUTPATIENT)
Dept: PHYSICAL THERAPY | Facility: CLINIC | Age: 37
End: 2022-08-29
Payer: COMMERCIAL

## 2022-08-30 ENCOUNTER — APPOINTMENT (OUTPATIENT)
Dept: PHYSICAL THERAPY | Facility: CLINIC | Age: 37
End: 2022-08-30
Payer: COMMERCIAL

## 2022-09-01 ENCOUNTER — APPOINTMENT (OUTPATIENT)
Dept: PHYSICAL THERAPY | Facility: CLINIC | Age: 37
End: 2022-09-01
Payer: COMMERCIAL

## 2022-09-06 ENCOUNTER — APPOINTMENT (OUTPATIENT)
Dept: PHYSICAL THERAPY | Facility: CLINIC | Age: 37
End: 2022-09-06
Payer: COMMERCIAL

## 2022-09-08 ENCOUNTER — OFFICE VISIT (OUTPATIENT)
Dept: PHYSICAL THERAPY | Facility: CLINIC | Age: 37
End: 2022-09-08
Payer: COMMERCIAL

## 2022-09-08 DIAGNOSIS — S62.392D CLOSED NONDISPLACED FRACTURE OF OTHER PART OF THIRD METACARPAL BONE OF RIGHT HAND WITH ROUTINE HEALING, SUBSEQUENT ENCOUNTER: ICD-10-CM

## 2022-09-08 DIAGNOSIS — M40.209 KYPHOSIS, UNSPECIFIED KYPHOSIS TYPE, UNSPECIFIED SPINAL REGION: Primary | ICD-10-CM

## 2022-09-08 PROCEDURE — 97140 MANUAL THERAPY 1/> REGIONS: CPT

## 2022-09-08 PROCEDURE — 97110 THERAPEUTIC EXERCISES: CPT

## 2022-09-08 NOTE — PROGRESS NOTES
Daily Note     Today's date: 2022  Patient name: Georgette Bragg  : 1985  MRN: [id number]  Referring provider: Letty Sandoval, PT  Dx:   Encounter Diagnosis     ICD-10-CM    1  Kyphosis, unspecified kyphosis type, unspecified spinal region  M40 209    2  Closed nondisplaced fracture of other part of third metacarpal bone of right hand with routine healing, subsequent encounter  S62 392D                   Subjective: Pt reports he is doing well  States mainly stiffness today  Objective: See treatment diary below      Assessment: Tolerated treatment well  Patient demonstrated fatigue post treatment  Pt some fatigue with current TB exercises however able to complete full program  Pt cont to have improved sx with current program and STM  Plan: Continue per plan of care        Precautions: PMHx of T-spine compression fx T6 - T10    Daily Treatment Diary    HEP: Handout provided and discussed      Manuals 22        ART             PROM/Stretch             IASTM             STM/Triggerpoint  10' 15' 15' 15'        JM                          Neuro Re-Ed             Prone Scap retract 2x10 2/10 2/10 2/10 2/10        SL T-spine Rot each side 10x ea 10x ea 10x ea 10x ea 20 ea        On Elbows neck extension PROM with fists under chin 4x30'' 4/30"  4/30" 4/30" 4/30" ea        Prone on elbows neck retraction  Next session 2/10 2/10 2/10        Kneeling T-spine Rot    This session                                   Ther Ex             TB Rows  L4 3/10 L4 3/10 L4 3/10 L4 3/10        TB ER  L3 3/10 ea L3 3/10 ea L3 3/10 ea L3 3/10        TB Ts and YS  L2 2/10 ea L3 2/10 ea L3 2/10 ea L3 2/10 ea        No moneys   2/10 L2 2/10 L2 L2 2/10                                                                         Ther Activity                                       Gait Training                                       Modalities             MHP  10' prone face cradle 10' Prone face cradle 10' face cradle 10' face cradle        CP Prone face cradle x10'            US/Stim

## 2022-09-10 ENCOUNTER — HOSPITAL ENCOUNTER (EMERGENCY)
Facility: HOSPITAL | Age: 37
Discharge: HOME | End: 2022-09-10
Attending: EMERGENCY MEDICINE | Admitting: EMERGENCY MEDICINE
Payer: COMMERCIAL

## 2022-09-10 ENCOUNTER — APPOINTMENT (OUTPATIENT)
Dept: RADIOLOGY | Facility: HOSPITAL | Age: 37
End: 2022-09-10
Payer: COMMERCIAL

## 2022-09-10 VITALS
HEIGHT: 68 IN | BODY MASS INDEX: 26.22 KG/M2 | HEART RATE: 69 BPM | WEIGHT: 173 LBS | RESPIRATION RATE: 18 BRPM | DIASTOLIC BLOOD PRESSURE: 94 MMHG | SYSTOLIC BLOOD PRESSURE: 132 MMHG | OXYGEN SATURATION: 98 % | TEMPERATURE: 97.4 F

## 2022-09-10 DIAGNOSIS — S62.315A: Primary | ICD-10-CM

## 2022-09-10 DIAGNOSIS — S62.347A CLOSED NONDISPLACED FRACTURE OF BASE OF FIFTH METACARPAL BONE OF LEFT HAND, INITIAL ENCOUNTER: ICD-10-CM

## 2022-09-10 PROCEDURE — 29125 APPL SHORT ARM SPLINT STATIC: CPT | Performed by: EMERGENCY MEDICINE

## 2022-09-10 PROCEDURE — 99284 EMERGENCY DEPT VISIT MOD MDM: CPT | Performed by: EMERGENCY MEDICINE

## 2022-09-10 PROCEDURE — 73130 X-RAY EXAM OF HAND: CPT

## 2022-09-10 PROCEDURE — 99283 EMERGENCY DEPT VISIT LOW MDM: CPT

## 2022-09-10 RX ORDER — HYDROCODONE BITARTRATE AND ACETAMINOPHEN 5; 325 MG/1; MG/1
1 TABLET ORAL ONCE
Status: COMPLETED | OUTPATIENT
Start: 2022-09-10 | End: 2022-09-10

## 2022-09-10 RX ADMIN — HYDROCODONE BITARTRATE AND ACETAMINOPHEN 1 TABLET: 5; 325 TABLET ORAL at 11:36

## 2022-09-10 NOTE — Clinical Note
Rea Arana was seen and treated in our emergency department on 9/10/2022  No work with the left hand for 1 week until released  Diagnosis:     Pj    He may return on this date: If you have any questions or concerns, please don't hesitate to call        Inés Baker, DO    ______________________________           _______________          _______________  Hospital Representative                              Date                                Time

## 2022-09-10 NOTE — Clinical Note
Sosa Pacheco was seen and treated in our emergency department on 9/10/2022  No work with the left hand for 1 week until released  Diagnosis:     Pj    He may return on this date: If you have any questions or concerns, please don't hesitate to call        Lenny Vizcaino DO    ______________________________           _______________          _______________  Hospital Representative                              Date                                Time

## 2022-09-10 NOTE — ED PROVIDER NOTES
History  Chief Complaint   Patient presents with    Hand Injury     Pt reports he was carrying a box that had a 30 lb weight in it, and the weight fell out of the box and he tried to catch it but instead weight landed on top of left hand  Patient is a 70-year-old male presents emergency department due to injury to the left hand he was carrying a box with a 30 lb weight in it the weight fell out he tried to catch it landed on the back of his left hand near the knuckles causing an injury to his left hand in the region of the 4th and 5th metacarpals  History provided by:  Patient  Hand Injury  Location:  Hand  Hand location:  L hand and dorsum of L hand  Injury: yes    Time since incident:  1 hour  Mechanism of injury: crush    Associated symptoms: no fatigue and no fever        Prior to Admission Medications   Prescriptions Last Dose Informant Patient Reported? Taking? Cholecalciferol 25 MCG (1000 UT) tablet   Yes No   Sig: Take 1,000 Units by mouth   Patient not taking: Reported on 2/5/2022    Magnesium 500 MG CAPS   Yes No   Sig: Take by mouth   Patient not taking: Reported on 10/4/2021   Multiple Vitamin (multivitamin) capsule   Yes No   Sig: Take 1 capsule by mouth daily   Patient not taking: Reported on 10/4/2021   ascorbic acid (VITAMIN C) 250 mg tablet   Yes No   Sig: Take 250 mg by mouth daily   Patient not taking: Reported on 10/4/2021   b complex vitamins tablet   Yes No   Sig: Take 1 tablet by mouth daily   Patient not taking: Reported on 2/5/2022    divalproex sodium (Depakote ER) 500 mg 24 hr tablet   No No   Sig: Take 1 tab (500 mg) nightly for 1 week, then take 1 tab (500 mg) twice a day   divalproex sodium (Depakote) 250 mg EC tablet   No No   Sig: Take 1 tablet (250 mg total) by mouth every 12 (twelve) hours Take in addition to one 500 mg tablet twice per day for a total of 750 mg twice per day        Facility-Administered Medications: None       Past Medical History:   Diagnosis Date  Anxiety     Brain bleed ([facility] [address] ) 2012    Compression fracture of body of thoracic vertebra (HCC)     Epilepsy (HCC)     GERD (gastroesophageal reflux disease)     Migraine     Seizures (HCC)        Past Surgical History:   Procedure Laterality Date    EPIDURAL BLOCK INJECTION N/A 2/11/2021    Procedure: T9 T10 INTERLAMINAR THORACIC EPIDURAL STEROID INJECTION;  Surgeon: Ave Guerrero MD;  [facility];  Service: Pain Management     FINGER SURGERY Right     5th digit    NERVE BLOCK Bilateral 1/21/2021    Procedure: T7 T8 T9 T10 MEDIAL BRANCH BLOCK #1;  Surgeon: Ave Guerrero MD;  [facility];  Service: Pain Management     WISDOM TOOTH EXTRACTION         Family History   Problem Relation Age of Onset    Thyroid disease Mother     Heart disease Father     Hypertension Father     No Known Problems Brother     Cancer Maternal Grandmother     No Known Problems Maternal Grandfather     Parkinsonism Paternal Grandmother     Heart disease Paternal Grandfather     Other Son         blounts disease    No Known Problems Daughter     Seizures Neg Hx      I have reviewed and agree with the history as documented  E-Cigarette/Vaping    E-Cigarette Use Never User      E-Cigarette/Vaping Substances     Social History     Tobacco Use    Smoking status: Former Smoker     Types: Cigarettes    Smokeless tobacco: Never Used   Vaping Use    Vaping Use: Never used   Substance Use Topics    Alcohol use: Not Currently    Drug use: Yes     Types: Marijuana     Comment: medical marijuana       Review of Systems   Constitutional: Negative for activity change, appetite change, chills, fatigue and fever  HENT: Negative for congestion, ear pain, rhinorrhea and sore throat  Eyes: Negative for discharge, redness and visual disturbance  Respiratory: Negative for cough, chest tightness, shortness of breath and wheezing  Cardiovascular: Negative for chest pain and palpitations  Gastrointestinal: Negative for abdominal pain, constipation, diarrhea, nausea and vomiting  Endocrine: Negative for polydipsia and polyuria  Genitourinary: Negative for difficulty urinating, dysuria, frequency, hematuria and urgency  Musculoskeletal: Negative for arthralgias and myalgias  Left hand pain and swelling   Skin: Negative for color change, pallor and rash  Neurological: Negative for dizziness, weakness, light-headedness, numbness and headaches  Hematological: Negative for adenopathy  Does not bruise/bleed easily  All other systems reviewed and are negative  Physical Exam  Physical Exam  Vitals and nursing note reviewed  Constitutional:       Appearance: He is well-developed  HENT:      Head: Normocephalic and atraumatic  Right Ear: External ear normal       Left Ear: External ear normal       Nose: Nose normal    Eyes:      Conjunctiva/sclera: Conjunctivae normal       Pupils: Pupils are equal, round, and reactive to light  Cardiovascular:      Rate and Rhythm: Normal rate and regular rhythm  Heart sounds: Normal heart sounds  Pulmonary:      Effort: Pulmonary effort is normal  No respiratory distress  Breath sounds: Normal breath sounds  No wheezing or rales  Chest:      Chest wall: No tenderness  Abdominal:      General: Bowel sounds are normal  There is no distension  Palpations: Abdomen is soft  Tenderness: There is no abdominal tenderness  There is no guarding  Musculoskeletal:      Left hand: Swelling, deformity, tenderness and bony tenderness present  Decreased range of motion  There is no disruption of two-point discrimination  Normal capillary refill  Normal pulse  Cervical back: Normal range of motion and neck supple  Skin:     General: Skin is warm and dry  Neurological:      Mental Status: He is alert and oriented to person, place, and time  Cranial Nerves: No cranial nerve deficit  Sensory: No sensory deficit  Vital Signs  ED Triage Vitals [09/10/22 1119]   Temperature Pulse Respirations Blood Pressure SpO2   (!) 97 4 °F (36 3 °C) 69 18 132/94 98 %      Temp src Heart Rate Source Patient Position - Orthostatic VS BP Location FiO2 (%)   -- -- -- -- --      Pain Score       8           Vitals:    09/10/22 1119   BP: 132/94   Pulse: 69         Visual Acuity      ED Medications  Medications   HYDROcodone-acetaminophen (NORCO) 5-325 mg per tablet 1 tablet (1 tablet Oral Given 9/10/22 1136)       Diagnostic Studies  Results Reviewed     None                 XR hand 3+ views LEFT   Final Result by Cecilia Greene MD (09/10 1824)      Fracture base of the 4th metacarpal with well-circumscribed bony density at the base of the 5th metacarpal as described  Final report is in agreement with preliminary reading by the ED staff  Workstation performed: [id number]                    Procedures  Splint application    Date/Time: 9/10/2022 11:00 AM  Performed by: Abbi Wilson DO  Authorized by: Abbi Wilson DO   Universal Protocol:  Procedure performed by:  Consent: Verbal consent obtained  Risks and benefits: risks, benefits and alternatives were discussed  Consent given by: patient  Time out: Immediately prior to procedure a "time out" was called to verify the correct patient, procedure, equipment, support staff and site/side marked as required  Timeout called at: 9/10/2022 11:00 AM   Patient understanding: patient states understanding of the procedure being performed      Pre-procedure details:     Sensation:  Normal  Procedure details:     Laterality:  Left    Location:  Hand    Hand:  L hand    Splint type:  Ulnar gutter    Supplies:  Cotton padding, fiberglass and elastic bandage  Post-procedure details:     Pain:  Improved    Sensation:  Normal    Patient tolerance of procedure:   Tolerated well, no immediate complications             ED Course          x-ray displaced fracture base of 4th metacarpal and nondisplaced fracture base of 5th metacarpal                      SBIRT 20yo+    Flowsheet Row Most Recent Value   SBIRT (23 yo +)    In order to provide better care to our patients, we are screening all of our patients for alcohol and drug use  Would it be okay to ask you these screening questions? No Filed at: 09/10/2022 1131                    [facility]  Number of Diagnoses or Management Options  Closed nondisplaced fracture of base of fifth metacarpal bone of left hand, initial encounter: new and requires workup  Fracture of base of fourth metacarpal bone of left hand: new and requires workup  Diagnosis management comments: Patient placed in ulnar gutter splint for metacarpal fractures on left hand advised no use of left hand and supportive care and follow-up with Orthopedics for further evaluation treatment return precautions and anticipatory guidance discussed           Amount and/or Complexity of Data Reviewed  Tests in the radiology section of CPT®: ordered and reviewed  Decide to obtain previous medical records or to obtain history from someone other than the patient: yes  Review and summarize past medical records: yes  Independent visualization of images, tracings, or specimens: yes    Risk of Complications, Morbidity, and/or Mortality  Presenting problems: low  Diagnostic procedures: low  Management options: low    Patient Progress  Patient progress: stable      Disposition  Final diagnoses:   Fracture of base of fourth metacarpal bone of left hand   Closed nondisplaced fracture of base of fifth metacarpal bone of left hand, initial encounter     Time reflects when diagnosis was documented in both MDM as applicable and the Disposition within this note     Time User Action Codes Description Comment    9/10/2022 11:32 AM Vee Davenport Add [Y52 315A] Fracture of base of fourth metacarpal bone of left hand     9/10/2022 11:33 AM Avery Davenport Add [K79 [id number]] Closed nondisplaced fracture of base of fifth metacarpal bone of left hand, initial encounter       ED Disposition     ED Disposition   Discharge    Condition   Stable    Date/Time   Sat Sep 10, 2022 11:32 AM    Comment   Pj Schaffer discharge to home/self care                 Follow-up Information     Follow up With Specialties Details Why Contact Info Additional Information    [address] Orthopedic Surgery Schedule an appointment as soon as possible for a visit in 3 days  [facility] [address]  [facility] [address], Entrance [address], [address] [place], [place], [facility]          Discharge Medication List as of 9/10/2022 11:34 AM      CONTINUE these medications which have NOT CHANGED    Details   ascorbic acid (VITAMIN C) 250 mg tablet Take 250 mg by mouth daily, Historical Med      b complex vitamins tablet Take 1 tablet by mouth daily, Historical Med      Cholecalciferol 25 MCG (1000 UT) tablet Take 1,000 Units by mouth, Historical Med      divalproex sodium (Depakote ER) 500 mg 24 hr tablet Take 1 tab (500 mg) nightly for 1 week, then take 1 tab (500 mg) twice a day, Normal      divalproex sodium (Depakote) 250 mg EC tablet Take 1 tablet (250 mg total) by mouth every 12 (twelve) hours Take in addition to one 500 mg tablet twice per day for a total of 750 mg twice per day , Starting Tue 12/28/2021, Normal      Magnesium 500 MG CAPS Take by mouth, Historical Med      Multiple Vitamin (multivitamin) capsule Take 1 capsule by mouth daily, Historical Med                 PDMP Review     None          ED Provider  Electronically Signed by           Kory Rodrigues, DO  09/10/22 [address] [place] [address], DO  09/14/22 9668

## 2022-09-12 ENCOUNTER — TELEPHONE (OUTPATIENT)
Dept: OBGYN CLINIC | Facility: HOSPITAL | Age: 37
End: 2022-09-12

## 2022-09-12 ENCOUNTER — APPOINTMENT (OUTPATIENT)
Dept: PHYSICAL THERAPY | Facility: CLINIC | Age: 37
End: 2022-09-12
Payer: COMMERCIAL

## 2022-09-12 NOTE — TELEPHONE ENCOUNTER
I received a Care Request from patient to schedule for:  Where Does it Hurt? Left hand  Are you considering joint replacement? No   Are you seeking a second opinion? No      I spoke to patient & he has an appt elsewhere today to see if he needs sx  He said he was going to follow up with us then but I told him that if he had sx he would have to go to the doctor that performs it for 90 days for post op care  I also told him if it is not sx & they see him that he would have to make certain they do not bill him for fx care otherwise he would need to follow up for 90 days for that as well

## 2022-09-12 NOTE — PROGRESS NOTES
PT Discharge    Today's date: 2022  Patient name: Shelli Montoya  : 1985  MRN: [id number]  Referring provider: Mani Haywood PT  Dx:   Encounter Diagnosis     ICD-10-CM    1  Kyphosis, unspecified kyphosis type, unspecified spinal region  M40 209    2  Closed nondisplaced fracture of other part of third metacarpal bone of right hand with routine healing, subsequent encounter  S67 052D                  Goals  STGs: To be complete within 4 weeks (partially met)  - Decrease pain to < 2/10 at worst  - Increase Thoracic ROM to WNL  - Increase postural strength and core stability and posterior lateral LE chain strength to > 4+/5  - Improve postural awareness capacity to > 60min before deficit    LTGs: To be complete within 6 weeks (partially met)  - Able to sit for any extended amount of time without pain or limitation for increased safety and functional capacity with ADLs and work-related duty  - Able to repetitively bend over at trunk without pain or limitation for increased safety and functional capacity with ADLs and and work-related duty  - Able to complete all lifting/carrying activity without pain or limitation for increased safety and functional capacity with ADLs and work-related duty  - Able to complete transfers repetitively without pain or limitation for increased safety and functional capacity with ADLs and work-related duty       Pt called and needs to D/C physical therapy at this time as he broke his hand over the weekend  Goals partially met            Objective Pain level ranges 2-8/10  AROM: T-spine Rot 50% decreased bilaterally; T-spine flexion and extension 50% decreased  Strength: Cervical, Thoracic, and Postural 3+/5  Postural Awareness: Poor (rounded shoulders, forward head)  FOTO: 44; GOAL: 56  Unable sit without pain and limitation  Unable to rotate trunk R or L without pain and limitation  Unable to complete lifting/carrying activity without pain and limitation  Unable to complete transfers without pain and limitation

## 2022-09-13 ENCOUNTER — TELEPHONE (OUTPATIENT)
Dept: OBGYN CLINIC | Facility: CLINIC | Age: 37
End: 2022-09-13

## 2022-09-13 ENCOUNTER — APPOINTMENT (OUTPATIENT)
Dept: PHYSICAL THERAPY | Facility: CLINIC | Age: 37
End: 2022-09-13
Payer: COMMERCIAL

## 2022-09-13 NOTE — TELEPHONE ENCOUNTER
Hello,  Please advise if the following patient can be forced onto the schedule:        Call back #: [phone]     Insurance: [employer]     Reason for appointment:Fracture base of the 4th metacarpal with well-circumscribed bony density at the base of the 5th metacarpal as described // was told by ortho in [place] it needs surgery ASAP  Had CT scan at [facility]  Requested doctor/location: any hand @ [facility]        Thank you

## 2022-09-14 ENCOUNTER — OFFICE VISIT (OUTPATIENT)
Dept: OBGYN CLINIC | Facility: HOSPITAL | Age: 37
End: 2022-09-14
Payer: COMMERCIAL

## 2022-09-14 VITALS
SYSTOLIC BLOOD PRESSURE: 107 MMHG | DIASTOLIC BLOOD PRESSURE: 67 MMHG | WEIGHT: 165.4 LBS | HEIGHT: 68 IN | BODY MASS INDEX: 25.07 KG/M2 | HEART RATE: 62 BPM

## 2022-09-14 DIAGNOSIS — S62.315A CLOSED DISPLACED FRACTURE OF BASE OF FOURTH METACARPAL BONE OF LEFT HAND, INITIAL ENCOUNTER: Primary | ICD-10-CM

## 2022-09-14 DIAGNOSIS — S63.267A CLOSED DISLOCATION OF FIFTH METACARPAL BONE OF LEFT HAND: ICD-10-CM

## 2022-09-14 PROCEDURE — 99204 OFFICE O/P NEW MOD 45 MIN: CPT | Performed by: ORTHOPAEDIC SURGERY

## 2022-09-14 RX ORDER — CEFAZOLIN SODIUM 1 G/50ML
1000 SOLUTION INTRAVENOUS ONCE
Status: CANCELLED | OUTPATIENT
Start: 2022-09-15 | End: 2022-09-14

## 2022-09-14 RX ORDER — METHYLPREDNISOLONE 4 MG/1
TABLET ORAL
COMMUNITY
Start: 2022-09-13

## 2022-09-14 RX ORDER — CYCLOBENZAPRINE HCL 5 MG
5 TABLET ORAL 3 TIMES DAILY PRN
COMMUNITY
Start: 2022-07-20

## 2022-09-14 RX ORDER — ACETAMINOPHEN 500 MG
500 TABLET ORAL
COMMUNITY

## 2022-09-14 NOTE — H&P (VIEW-ONLY)
ASSESSMENT/PLAN:    Assessment:   Left ring finger metacarpal base fracture / dislocation  (DOI 9/10/22)  Left small finger metacarpal subluxation  Left hamate fracture     Plan:   Close vs open reduction and fixation left ring and small metacarpal fracture dislocation (general)     Follow Up: After Surgery    To Do Next Visit:  X-rays of the  left  hand      Operative Discussions:     Fracture Operative Treatment: The physiology of a fractured bone was discussed with the patient today  With displaced fractures, operative treatment often results in a functional recovery  Typically, these fractures undergo reduction either through percutaneous or open methods depending on the location and fracture pattern  Radiographs are typically taken at intervals throughout the fracture healing ensure maintenance of reduction and alignment  If the fracture loses its alignment, revision surgery may be required  Medical conditions such as diabetes, osteoporosis, vitamin D deficiency, and a history of or exposure to smoking may delay or prevent fracture healing  The risks and benefits of the procedure were explained to the patient, which include, but are not limited to: Bleeding, infection, recurrence, pain, scar, malunion, nonunion, damage to tendons, damage to nerves, and damage to blood vessels, and complications related to anesthesia, failure to give desire result, need for more surgery  These risks, along with alternative conservative treatment options, and postoperative protocols were voiced back and understood by the patient  All questions were answered to the patient's satisfaction  The patient agrees to comply with a standard postoperative protocol, and is willing to proceed  Education was provided via written and auditory forms  There were no barriers to learning  Written handouts regarding wound care, incision and scar care, and general preoperative information was provided to the patient    Prior to surgery, the patient may be requested to stop all anti-inflammatory medications  Prophylactic aspirin, Plavix, and Coumadin may be allowed to be continued  Medications including vitamin E , ginkgo, and fish oil are requested to be stopped approximately one week prior to surgery  Hypertensive medications and beta blockers, if taken, should be continued  _____________________________________________________  CHIEF COMPLAINT:  Chief Complaint   Patient presents with    Left Hand - Fracture         SUBJECTIVE:  Eric Dykes is a 39 y o  male who presents with Fracture to the left hand  This started  4 day(s) ago as Due to a personal injury  Patient reports a heavy weight landed on the hand  He was initially seen a [facility]  Patient decided to follow up here as [facility] couldn't see him for 2 weeks         PAST MEDICAL HISTORY:  Past Medical History:   Diagnosis Date    Anxiety     Brain bleed ([facility] [address] ) 2012    Compression fracture of body of thoracic vertebra (HCC)     [facility])     GERD (gastroesophageal reflux disease)     Migraine     Seizures ([facility] [address] )        PAST SURGICAL HISTORY:  Past Surgical History:   Procedure Laterality Date    EPIDURAL BLOCK INJECTION N/A 2/11/2021    Procedure: T9 T10 INTERLAMINAR THORACIC EPIDURAL STEROID INJECTION;  Surgeon: Royce Morrell MD;  [facility];  Service: Pain Management     FINGER SURGERY Right     5th digit    NERVE BLOCK Bilateral 1/21/2021    Procedure: T7 T8 T9 T10 MEDIAL BRANCH BLOCK #1;  Surgeon: Royce Morrell MD;  Location:  [facility];  Service: Pain Management     WISDOM TOOTH EXTRACTION         FAMILY HISTORY:  Family History   Problem Relation Age of Onset    Thyroid disease Mother     Heart disease Father     Hypertension Father     No Known Problems Brother     Cancer Maternal Grandmother     No Known Problems Maternal Grandfather     Parkinsonism Paternal Grandmother     Heart disease Paternal Grandfather     Other Son         blounts disease  No Known Problems Daughter     Seizures Neg Hx        SOCIAL HISTORY:  Social History     Tobacco Use    Smoking status: Former Smoker     Types: Cigarettes    Smokeless tobacco: Never Used   Vaping Use    Vaping Use: Never used   Substance Use Topics    Alcohol use: Not Currently    Drug use: Yes     Types: Marijuana     Comment: medical marijuana       MEDICATIONS:    Current Outpatient Medications:     divalproex sodium (Depakote ER) 500 mg 24 hr tablet, Take 1 tab (500 mg) nightly for 1 week, then take 1 tab (500 mg) twice a day, Disp: 60 tablet, Rfl: 11    acetaminophen (TYLENOL) 500 mg tablet, Take 500 mg by mouth, Disp: , Rfl:     ascorbic acid (VITAMIN C) 250 mg tablet, Take 250 mg by mouth daily (Patient not taking: Reported on 10/4/2021), Disp: , Rfl:     b complex vitamins tablet, Take 1 tablet by mouth daily (Patient not taking: Reported on 2/5/2022 ), Disp: , Rfl:     Cholecalciferol 25 MCG (1000 UT) tablet, Take 1,000 Units by mouth (Patient not taking: Reported on 2/5/2022 ), Disp: , Rfl:     cyclobenzaprine (FLEXERIL) 5 mg tablet, Take 5 mg by mouth 3 (three) times a day as needed, Disp: , Rfl:     divalproex sodium (Depakote) 250 mg EC tablet, Take 1 tablet (250 mg total) by mouth every 12 (twelve) hours Take in addition to one 500 mg tablet twice per day for a total of 750 mg twice per day , Disp: 180 tablet, Rfl: 3    Magnesium 500 MG CAPS, Take by mouth (Patient not taking: Reported on 10/4/2021), Disp: , Rfl:     methylPREDNISolone 4 MG tablet therapy pack, , Disp: , Rfl:     Multiple Vitamin (multivitamin) capsule, Take 1 capsule by mouth daily (Patient not taking: Reported on 10/4/2021), Disp: , Rfl:     ALLERGIES:  Allergies   Allergen Reactions    Sumatriptan Rash     Burning sensation   skin gets warm  skin gets warm         REVIEW OF SYSTEMS:  Pertinent items are noted in HPI  A comprehensive review of systems was negative      LABS:  HgA1c: No results found for: HGBA1C  BMP:   Lab Results   Component Value Date    CALCIUM 8 8 04/16/2021    K 3 6 04/16/2021    CO2 28 04/16/2021     04/16/2021    BUN 15 04/16/2021    CREATININE 1 02 04/16/2021         _____________________________________________________  PHYSICAL EXAMINATION:  Vital signs: /67   Pulse 62   Ht 5' 8" (1 727 m)   Wt 75 kg (165 lb 6 4 oz)   BMI 25 15 kg/m²   General: well developed and well nourished, alert, oriented times 3 and appears comfortable  Psychiatric: Normal  HEENT: Trachea Midline, No torticollis  Cardiovascular: No discernable arrhythmia  Pulmonary: No wheezing or stridor  Abdomen: No rebound or guarding  Extremities: No peripheral edema  Skin: No masses, erythema, lacerations, fluctation, ulcerations  Neurovascular: Sensation Intact to the Median, Ulnar, Radial Nerve, Motor Intact to the Median, Ulnar, Radial Nerve and Pulses Intact    MUSCULOSKELETAL EXAMINATION:  Left hand:   Diffuse Ecchymosis  Diffuse swelling  Decrease composite fist   Positive TTP base of the ring and small MC, hamate  Exam limited by acute fracture dislocation  _____________________________________________________  STUDIES REVIEWED:  CT scan left hand demonstrates 50% subluxation ring finger MC base fracture, 50% subluxation small MC with associated hamate fracture      PROCEDURES PERFORMED:  Procedures  No Procedures performed today   Scribe Attestation    I,:  Richrobinson Estrella am acting as a scribe while in the presence of the attending physician :       I,:  Stephen Jorgensen MD personally performed the services described in this documentation    as scribed in my presence :

## 2022-09-14 NOTE — PATIENT INSTRUCTIONS
[facility]  Tammie Garvey MD  Chief of [address] [place] [address]  [phone]  You have chosen to undergo surgery for your condition  Any surgery is associated with risks of potential complications  Certain medical problems such as smoking, diabetes, thyroid disease, neuropathy, malnutrition or bleeding problems may increase your risk of complications  Complications after surgery are rare but include the following:  Bleeding Infection  Scar Pain  Tenderness Problems with healing  Damage to nerves Damage to blood vessels  Lack of desired results Need for further surgery  Numbness Stiffness  Problems with anesthesia Blood clots  Need for hardware removal (if inserted)    If bony work is done, other risks include:  Delayed bone healing  Lack of bone healing  Bones healing in wrong position    While these risks and complications are rare and infrequent they are still important to know and discuss  If you have any other questions, please let me know  _____________________________________________________________________________________  It can be difficult, but it is possible to get on with your life with only one hand for the first few weeks after your operation  The following are a few suggestions that may be helpful when you're recovering from your hand problem or from your hand surgery  While most of these suggestions are really only applicable if your dominant or your writing hand is affected, some apply to problems involving either hand  Most daily activities can be accomplished with some modifications, rather than the need for store bought devices  Before surgery, if you can:  Ask for help: Have others help you with: childcare, housework, and meals  Practice: Dressing, undressing, using the toilet, brushing your teeth, showering  Prepare: for the first few days after surgery    Open and re-sealed cans and bottles that you may need  Open medication containers and leave them easy-to-read open  Make sure you put these medication containers out of reach of children, even if you don't expect children to visit you  Prepare and think about no-cutmeals-sandwiches, ground meats, etc     It helps to have  In the shower  Plastic bags and rubber bands to cover bandages-the esparza that newspapers come in are good  Also, small trashcan liners will work  Use 2 of these at a time  The other option is an oversized rubber glove that may be purchased at a food store to aid in dishwashing  A bottle sponge (soft sponge on a long stick)-for the armpit of yourgood hand  Shower brush  At [facility] in the shower will help you to wash your hair  A cotton terrycloth bathrobe to aid you in drying your back    In the bathroom  Toothpaste, shampoo, etc  in a flip top or pump dispenser  Flossers (dental floss on aYshaped handle)  Consider an electric razor    In the bedroom  Back scratcher  Large sleeve shirts and tops  Put away clothing which buttons, fastens or snaps in the back, or which uses drawstrings    In the kitchen  A rubber jar opener Rigoberto-to help open jars, but also to keep things from sliding around while you are working on them  Double suction cup pads (the little octopus)-to hold items while you use or wash them  An electric can opener with a lid magnet strong enough to hold the can in the air-for one-handed use  Consider Latrelle Life and wear haircut  [facility]  You should keep your bandages dry and clean; change your dressings if you see drainage coming through your dressings  Signs of infection include increased pain, swelling, redness, warmth, and excessive or foul-smelling drainage  Please contact our office if you experience signs of infection  You may wash with soap and water after given permission  Sutures will be removed between 7-14 days after surgery if the wound is healed   Patients who smoke, have diabetes, or have nutritional problems may need longer to heal   Steri-strips may be placed across your incision at this time, which will fall off or peel away  To help prevent infection, do not submerse your incision area for 2-3 weeks (i e  no hot tubs, pools, ocean, dish water, fish ponds, fish tanks, bathtubs)  Swelling, bruising, and numbness are common after surgery  To help reduce these symptoms, keep the area elevated  Scar Care: To improve the appearance of your scar, you can massage the healed area (using circular motions with your fingertip) for 5 minutes 3x a day after your steri-strips peel away  You can use regular hand lotion or Scar zone from the store  You may also use Silicone pads after the stitches are removed (available at the store)  Redness and bumpiness of the scar are expected  These generally improve as healing progresses, but redness can be expected for up to 6-8 months  ** If you have any questions or concerns, please call our office  [phone]  _____________________________________________________________________________________  Pain Management  Pain after an injury or surgery is common and should often be expected  There are many ways to manage and reduce this pain  This often does not include medications or may not exclusively include medication  Each patient, surgery and surgeon are unique, and the approach to management of pain is individual   It is important to try to discuss your concerns and expectations regarding pain with your surgical team before and after surgery  They want to help you get better and have a good patient experience  Your patient experience includes understanding and treating your pain  Here's what you may expect before surgery and how to manage your pain and medications after surgery  Again, the approach to pain management after injury or surgery is individualized, and this is general information   Your surgical team will have more specific recommendations for you  Before using any of the methods explored here, please discuss with your medical team if these pain management methods are appropriate for you  We advise good communication with your team to let them help you achieve the best outcome  Surgery Day  As your surgery begins, your surgeon and anesthesia team may give you medications by mouth, IV, and/or injection  Giving you medication as the surgery progresses not only helps you to decrease pain during the surgery, but it also reduces your pain post-surgery  You may also receive medication in the recovery room after surgery, if needed  In some cases, you will receive prescription pain medication and instructions for its use to use at home in the days following your surgery  You may also receive instructions on using over-the-counter pain medications  It is important to follow these directions carefully, as many over-the counter medications contain some of the same ingredients as prescription pain medications and using them together can result in a dangerous accidental overdose  Post-Surgery Pain Management  While always important to follow your specific postoperative instructions, here are some different methods, outside of medication, that your team may recommend to reduce your pain:   Elevate: Elevating the injured area so it is higher than your heart can reduce swelling and pain  Swelling can increase quickly by putting your hand at your side, and this can make your dressing feel tight  Often, the pain associated with swelling is difficult to control, so it is best to avoid this problem  Take care of your dressing: If your dressing/splint feels tight, and elevation for 10 minutes does not improve the tight sensation, contact your surgical team  It may be recommended that you unravel any tape or elastic wrap and loosen the outer bandage   If this does not help, you may be advised to tear, unravel, or cut the inner layers with blunt tipped scissors  Make sure you are cutting on the opposite side of where your incision is located  When done, you will need to try to rebuild your dressing to keep your wound clean and covered  Before doing any of this, check with your medical team  They may want to be aware of the tight dressing and could have different instructions for loosening the dressing  Keep moving: If allowed by your surgeon, try to frequently move the fingers, wrist, elbow, and/or shoulder that are outside of the splint or cast  You can do this gently and slowly  This improves blood flow, which limits swelling and prevents bandages from feeling tight  It may be uncomfortable to move at first, but the discomfort will often improve with time and frequently improves with motion  Your surgeon will be more specific about what to move and what to rest    Ice the area: Icing the painful area will typically reduce swelling and inflammation and reduce pain  However, there may be certain procedures (such as surgery on arteries, skin grafts or flaps) where ice could be harmful, so consult your surgeon before using ice  Heat the area: If you are in the phase of care where you can remove your dressing or splint, you may be able to try heat  Heat increases blood flow to an area and can help with muscle spasms, muscle soreness and joint pain  Avoid smoking: Chemicals present in cigarettes can increase pain  Reducing or quitting smoking can improve your pain  Consume vitamin C: Consuming 500mg of vitamin C daily for 6 weeks may reduce pain after some injuries  However, it is ascorbic acid, which can upset your stomach if you have heartburn or gastritis  Post-Surgery Medication Management  The pain-management methods listed above are often effective when used in combination with taking medications post-surgery  There are many different classes of medication that can help pain   Some can be purchased over the counter, and some require a prescription  All medicines can have some benefits and some adverse reactions/side effects  Your surgical team will balance these issues to provide a plan for you  Some commonly prescribed medications can include:   Tylenol (Acetaminophen)   Aleve (Naprosyn/naproxen)   Motrin/Advil (Ibuprofen)   Celebrex (Celecoxib)   Toradol (Ketorolac)    When taking medication, keep the following in mind: It may take 30-60 minutes for your body to absorb the medication after you take it by mouth, so be patient  Longer-acting medications used before bedtime may help you sleep better the first few nights after surgery  The first few nights post-surgery will generally be the toughest    Do not exceed the dose recommended by your physician or combine medications without consulting with your physician  If you are unfamiliar with these medications, your surgeon can specify how much medication you should take, for how long, and how often  Opioids  Opioids are a type of pain medication made from the poppy plant that is used to make opium and heroin  They can be effective in treating pain, but opioids should be used at a last resort, in limited amounts, and for a limited number of days  Use of these medications should only be done under the guidance of your doctor  When taking opioids, you are at risk of becoming dependent on the medicine, and they may become less effective over time  Oxycodone and hydrocodone are two of the most commonly used and effective opioid pain pills  These pills are frequently combined with Tylenol (acetaminophen), but it must be done carefully  Be sure to consult your surgeon before doing so   Your surgeon will give you a customized plan for managing your pain based on your type of surgery, number of procedures, duration of surgery, etc  Keep in mind that many opioids are combined with Tylenol (acetaminophen) already in the pill, so take care to follow your prescribed directions and not to take more opioids or acetaminophen than prescribed  Overdoses of each of these medications can be dangerous and life threatening  Learn more about opioids, including the side effects, how to safely use them, and how to properly dispose of any extras  Following the program below will greatly decrease your post-operative pain  1  Aleve (naproxen) 220 mg and Tylenol Arthritis 650 mg on the afternoon/evening of surgery  Do NOT take Aleve if you have a history of gastric ulcers, uncontrolled reflux or have been told previously by a physician that you should not take anti-inflammatory medications such as Advil/Aleve/Motrin  2  Aleve (naproxen) 220 mg in the morning and afternoon, for about 2-3 days after the surgery; even if you have no pain  You can stop two days after surgery if your hand does not hurt  3  Tylenol Arthritis (or any brand of acetaminophen 8-hour), 650 mg every eight hours, with a maximum dose of 3000 mg per day, for about 2-3 days after the surgery, even if you have no pain  Tylenol Arthritis plus Aleve is a case of 1+1=3, not 2  That is, they work together as a team to make each other stronger a  The maximum amount of Tylenol is 3000 mg per day, which is the same as 4 of the 650 mg pills  Remember; don't substitute any other medication for the Tylenol: don't take Motrin, aspirin, or any other over the counter medication  It must be Tylenol (or any brand of acetaminophen) for it to work as a team  Remember as well that Tylenol Arthritis is taken every 8 hours, the Aleve is twice a day  4  Norco (hydrocodone/acetaminophen) 5/325 mg or a similar medication to assist with sleeping at night, and possibly every 6 hours during the day, ONLY IF NEEDED, for the first few days  You will be given a written prescription, but many patients find they do not need to take any or all of the Norco  Do not take the medication just because it was given to you; only take it if you need it   Remember that Rhonda Olguin is an opioid pain medication and can lead to addiction, respiratory sedation, and death  In 2015 over 17,500 Americans  from opioid overdoses and I don't want this to happen to you  Opioid medications can also cause constipation, so please plan for that, as well as possible mental confusion and drowsiness  Do not drive while you are taking this medication  With this protocol, you can expect your post-operative pain to be very manageable  The worst pain only lasts for the first 48 hours and improves significantly after that  By the time you see your surgeon for your post-operative visit you probably will no longer require any pain medication on a daily basis  Important Information about Painkillers  You are being prescribed an opioid pain medication to help with severe pain after surgery  Use the medication sparingly as needed to reduce your pain  The goal is not to be pain-free, but to make the pain more tolerable  If you are able to take non-steroidal anti-inflammatory drugs (NSAIDs), alternate the prescription pain medication with over-the-counter Ibuprofen or Naproxen (if you do not have a history of reflux or stomach ulcers)  This will allow you to take less opioid medication  Also, elevate the hand to reduce swelling and consider applying ice to the affected area for 15 minutes at a time, several times per day  Opioid medication is powerful and has the risk of overdose, abuse, and addiction  Only use the medication as directed by your physician and keep the pills in a safe place  When you no longer need the medication, please dispose of the pills properly as directed below  Allowing someone else to use your opioid prescription is illegal   Also, possible side effects from opioid medications are over-sedation, itching, nausea/vomiting, and constipation  Do not drive a vehicle or operate machinery while taking the pain medications  Drink plenty of fluids and consider a stool softener to prevent constipation    If the pain you are experiencing is not severe, stop taking the opioid pills and only take over-the-counter medications such as Tylenol and Ibuprofen  Disposing of unused pain medications:  (1) Follow pharmacist instructions on the bottle if available, or  (2) Call [phone] for a [employer] authorized collection site in your area, or  (3) If no collection site is available in your area, mix the pills with an undesirable substance such as used coffee grounds, kevin litter, or dirt  Place this mixture in a sealed plastic bag  Place the bag in the household garbage  Adapted from the opioid awareness section of the [employer], thanks to Diann Elias and Renee Fam

## 2022-09-14 NOTE — PROGRESS NOTES
ASSESSMENT/PLAN:    Assessment:   Left ring finger metacarpal base fracture / dislocation  (DOI 9/10/22)  Left small finger metacarpal subluxation  Left hamate fracture     Plan:   Close vs open reduction and fixation left ring and small metacarpal fracture dislocation (general)     Follow Up: After Surgery    To Do Next Visit:  X-rays of the  left  hand      Operative Discussions:     Fracture Operative Treatment: The physiology of a fractured bone was discussed with the patient today  With displaced fractures, operative treatment often results in a functional recovery  Typically, these fractures undergo reduction either through percutaneous or open methods depending on the location and fracture pattern  Radiographs are typically taken at intervals throughout the fracture healing ensure maintenance of reduction and alignment  If the fracture loses its alignment, revision surgery may be required  Medical conditions such as diabetes, osteoporosis, vitamin D deficiency, and a history of or exposure to smoking may delay or prevent fracture healing  The risks and benefits of the procedure were explained to the patient, which include, but are not limited to: Bleeding, infection, recurrence, pain, scar, malunion, nonunion, damage to tendons, damage to nerves, and damage to blood vessels, and complications related to anesthesia, failure to give desire result, need for more surgery  These risks, along with alternative conservative treatment options, and postoperative protocols were voiced back and understood by the patient  All questions were answered to the patient's satisfaction  The patient agrees to comply with a standard postoperative protocol, and is willing to proceed  Education was provided via written and auditory forms  There were no barriers to learning  Written handouts regarding wound care, incision and scar care, and general preoperative information was provided to the patient    Prior to surgery, the patient may be requested to stop all anti-inflammatory medications  Prophylactic aspirin, Plavix, and Coumadin may be allowed to be continued  Medications including vitamin E , ginkgo, and fish oil are requested to be stopped approximately one week prior to surgery  Hypertensive medications and beta blockers, if taken, should be continued  _____________________________________________________  CHIEF COMPLAINT:  Chief Complaint   Patient presents with    Left Hand - Fracture         SUBJECTIVE:  Machelle Celis is a 39 y o  male who presents with Fracture to the left hand  This started  4 day(s) ago as Due to a personal injury  Patient reports a heavy weight landed on the hand  He was initially seen a [facility]  Patient decided to follow up here as [facility] couldn't see him for 2 weeks         PAST MEDICAL HISTORY:  Past Medical History:   Diagnosis Date    Anxiety     Brain bleed ([facility] [address] ) 2012    Compression fracture of body of thoracic vertebra (HCC)     [facility]ile[facility])     GERD (gastroesophageal reflux disease)     Migraine     Seizures ([facility] [address] )        PAST SURGICAL HISTORY:  Past Surgical History:   Procedure Laterality Date    EPIDURAL BLOCK INJECTION N/A 2/11/2021    Procedure: T9 T10 INTERLAMINAR THORACIC EPIDURAL STEROID INJECTION;  Surgeon: Tu Landa MD;  Location:  [facility];  Service: Pain Management     FINGER SURGERY Right     5th digit    NERVE BLOCK Bilateral 1/21/2021    Procedure: T7 T8 T9 T10 MEDIAL BRANCH BLOCK #1;  Surgeon: Tu Landa MD;  Location:  [facility];  Service: Pain Management     WISDOM TOOTH EXTRACTION         FAMILY HISTORY:  Family History   Problem Relation Age of Onset    Thyroid disease Mother     Heart disease Father     Hypertension Father     No Known Problems Brother     Cancer Maternal Grandmother     No Known Problems Maternal Grandfather     Parkinsonism Paternal Grandmother     Heart disease Paternal Grandfather     Other Son         blounts disease  No Known Problems Daughter     Seizures Neg Hx        SOCIAL HISTORY:  Social History     Tobacco Use    Smoking status: Former Smoker     Types: Cigarettes    Smokeless tobacco: Never Used   Vaping Use    Vaping Use: Never used   Substance Use Topics    Alcohol use: Not Currently    Drug use: Yes     Types: Marijuana     Comment: medical marijuana       MEDICATIONS:    Current Outpatient Medications:     divalproex sodium (Depakote ER) 500 mg 24 hr tablet, Take 1 tab (500 mg) nightly for 1 week, then take 1 tab (500 mg) twice a day, Disp: 60 tablet, Rfl: 11    acetaminophen (TYLENOL) 500 mg tablet, Take 500 mg by mouth, Disp: , Rfl:     ascorbic acid (VITAMIN C) 250 mg tablet, Take 250 mg by mouth daily (Patient not taking: Reported on 10/4/2021), Disp: , Rfl:     b complex vitamins tablet, Take 1 tablet by mouth daily (Patient not taking: Reported on 2/5/2022 ), Disp: , Rfl:     Cholecalciferol 25 MCG (1000 UT) tablet, Take 1,000 Units by mouth (Patient not taking: Reported on 2/5/2022 ), Disp: , Rfl:     cyclobenzaprine (FLEXERIL) 5 mg tablet, Take 5 mg by mouth 3 (three) times a day as needed, Disp: , Rfl:     divalproex sodium (Depakote) 250 mg EC tablet, Take 1 tablet (250 mg total) by mouth every 12 (twelve) hours Take in addition to one 500 mg tablet twice per day for a total of 750 mg twice per day , Disp: 180 tablet, Rfl: 3    Magnesium 500 MG CAPS, Take by mouth (Patient not taking: Reported on 10/4/2021), Disp: , Rfl:     methylPREDNISolone 4 MG tablet therapy pack, , Disp: , Rfl:     Multiple Vitamin (multivitamin) capsule, Take 1 capsule by mouth daily (Patient not taking: Reported on 10/4/2021), Disp: , Rfl:     ALLERGIES:  Allergies   Allergen Reactions    Sumatriptan Rash     Burning sensation   skin gets warm  skin gets warm         REVIEW OF SYSTEMS:  Pertinent items are noted in HPI  A comprehensive review of systems was negative      LABS:  HgA1c: No results found for: HGBA1C  BMP:   Lab Results   Component Value Date    CALCIUM 8 8 04/16/2021    K 3 6 04/16/2021    CO2 28 04/16/2021     04/16/2021    BUN 15 04/16/2021    CREATININE 1 02 04/16/2021         _____________________________________________________  PHYSICAL EXAMINATION:  Vital signs: /67   Pulse 62   Ht 5' 8" (1 727 m)   Wt 75 kg (165 lb 6 4 oz)   BMI 25 15 kg/m²   General: well developed and well nourished, alert, oriented times 3 and appears comfortable  Psychiatric: Normal  HEENT: Trachea Midline, No torticollis  Cardiovascular: No discernable arrhythmia  Pulmonary: No wheezing or stridor  Abdomen: No rebound or guarding  Extremities: No peripheral edema  Skin: No masses, erythema, lacerations, fluctation, ulcerations  Neurovascular: Sensation Intact to the Median, Ulnar, Radial Nerve, Motor Intact to the Median, Ulnar, Radial Nerve and Pulses Intact    MUSCULOSKELETAL EXAMINATION:  Left hand:   Diffuse Ecchymosis  Diffuse swelling  Decrease composite fist   Positive TTP base of the ring and small MC, hamate  Exam limited by acute fracture dislocation  _____________________________________________________  STUDIES REVIEWED:  CT scan left hand demonstrates 50% subluxation ring finger MC base fracture, 50% subluxation small MC with associated hamate fracture      PROCEDURES PERFORMED:  Procedures  No Procedures performed today   Scribe Attestation    I,:  Pearl Ash am acting as a scribe while in the presence of the attending physician :       I,:  Afia Alford MD personally performed the services described in this documentation    as scribed in my presence :

## 2022-09-15 ENCOUNTER — ANESTHESIA EVENT (OUTPATIENT)
Dept: PERIOP | Facility: HOSPITAL | Age: 37
End: 2022-09-15
Payer: COMMERCIAL

## 2022-09-15 ENCOUNTER — ANESTHESIA (OUTPATIENT)
Dept: PERIOP | Facility: HOSPITAL | Age: 37
End: 2022-09-15
Payer: COMMERCIAL

## 2022-09-15 ENCOUNTER — APPOINTMENT (OUTPATIENT)
Dept: PHYSICAL THERAPY | Facility: CLINIC | Age: 37
End: 2022-09-15
Payer: COMMERCIAL

## 2022-09-15 ENCOUNTER — APPOINTMENT (OUTPATIENT)
Dept: RADIOLOGY | Facility: HOSPITAL | Age: 37
End: 2022-09-15
Payer: COMMERCIAL

## 2022-09-15 ENCOUNTER — HOSPITAL ENCOUNTER (OUTPATIENT)
Facility: HOSPITAL | Age: 37
Setting detail: OUTPATIENT SURGERY
Discharge: HOME | End: 2022-09-15
Attending: ORTHOPAEDIC SURGERY | Admitting: ORTHOPAEDIC SURGERY
Payer: COMMERCIAL

## 2022-09-15 VITALS
TEMPERATURE: 98 F | BODY MASS INDEX: 25.65 KG/M2 | RESPIRATION RATE: 12 BRPM | OXYGEN SATURATION: 100 % | DIASTOLIC BLOOD PRESSURE: 75 MMHG | HEIGHT: 67 IN | SYSTOLIC BLOOD PRESSURE: 119 MMHG | WEIGHT: 163.4 LBS | HEART RATE: 56 BPM

## 2022-09-15 DIAGNOSIS — S62.325A CLOSED DISPLACED FRACTURE OF SHAFT OF FOURTH METACARPAL BONE OF LEFT HAND, INITIAL ENCOUNTER: Primary | ICD-10-CM

## 2022-09-15 PROCEDURE — 26676 PIN HAND DISLOCATION: CPT | Performed by: PHYSICIAN ASSISTANT

## 2022-09-15 PROCEDURE — 25635 CLTX CARPL FX W/MNPJ EA B1: CPT | Performed by: PHYSICIAN ASSISTANT

## 2022-09-15 PROCEDURE — 26676 PIN HAND DISLOCATION: CPT | Performed by: ORTHOPAEDIC SURGERY

## 2022-09-15 PROCEDURE — 73120 X-RAY EXAM OF HAND: CPT

## 2022-09-15 PROCEDURE — 26608 TREAT METACARPAL FRACTURE: CPT | Performed by: PHYSICIAN ASSISTANT

## 2022-09-15 PROCEDURE — C1713 ANCHOR/SCREW BN/BN,TIS/BN: HCPCS | Performed by: ORTHOPAEDIC SURGERY

## 2022-09-15 PROCEDURE — 26608 TREAT METACARPAL FRACTURE: CPT | Performed by: ORTHOPAEDIC SURGERY

## 2022-09-15 PROCEDURE — 25635 CLTX CARPL FX W/MNPJ EA B1: CPT | Performed by: ORTHOPAEDIC SURGERY

## 2022-09-15 DEVICE — K-WIRE 1.1MM X 9INL SMTH XMOND/XMOND: Type: IMPLANTABLE DEVICE | Site: FINGER | Status: FUNCTIONAL

## 2022-09-15 RX ORDER — CEFAZOLIN SODIUM 1 G/50ML
1000 SOLUTION INTRAVENOUS ONCE
Status: COMPLETED | OUTPATIENT
Start: 2022-09-15 | End: 2022-09-15

## 2022-09-15 RX ORDER — LIDOCAINE HYDROCHLORIDE AND EPINEPHRINE 10; 10 MG/ML; UG/ML
INJECTION, SOLUTION INFILTRATION; PERINEURAL AS NEEDED
Status: DISCONTINUED | OUTPATIENT
Start: 2022-09-15 | End: 2022-09-15 | Stop reason: HOSPADM

## 2022-09-15 RX ORDER — FENTANYL CITRATE 50 UG/ML
INJECTION, SOLUTION INTRAMUSCULAR; INTRAVENOUS AS NEEDED
Status: DISCONTINUED | OUTPATIENT
Start: 2022-09-15 | End: 2022-09-15

## 2022-09-15 RX ORDER — SODIUM CHLORIDE, SODIUM LACTATE, POTASSIUM CHLORIDE, CALCIUM CHLORIDE 600; 310; 30; 20 MG/100ML; MG/100ML; MG/100ML; MG/100ML
INJECTION, SOLUTION INTRAVENOUS CONTINUOUS PRN
Status: DISCONTINUED | OUTPATIENT
Start: 2022-09-15 | End: 2022-09-15

## 2022-09-15 RX ORDER — TRAMADOL HYDROCHLORIDE 50 MG/1
50 TABLET ORAL EVERY 6 HOURS PRN
Status: DISCONTINUED | OUTPATIENT
Start: 2022-09-15 | End: 2022-09-16 | Stop reason: HOSPADM

## 2022-09-15 RX ORDER — COVID-19 ANTIGEN TEST
220 KIT MISCELLANEOUS 2 TIMES DAILY
Qty: 60 CAPSULE | Refills: 0 | Status: SHIPPED | OUTPATIENT
Start: 2022-09-15 | End: 2022-10-15

## 2022-09-15 RX ORDER — HYDROMORPHONE HCL/PF 1 MG/ML
0.5 SYRINGE (ML) INJECTION
Status: COMPLETED | OUTPATIENT
Start: 2022-09-15 | End: 2022-09-15

## 2022-09-15 RX ORDER — PROPOFOL 10 MG/ML
INJECTION, EMULSION INTRAVENOUS AS NEEDED
Status: DISCONTINUED | OUTPATIENT
Start: 2022-09-15 | End: 2022-09-15

## 2022-09-15 RX ORDER — HYDROCODONE BITARTRATE AND ACETAMINOPHEN 5; 325 MG/1; MG/1
1 TABLET ORAL EVERY 6 HOURS PRN
Qty: 5 TABLET | Refills: 0 | Status: SHIPPED | OUTPATIENT
Start: 2022-09-15 | End: 2022-09-19 | Stop reason: SDUPTHER

## 2022-09-15 RX ORDER — ACETAMINOPHEN 325 MG/1
650 TABLET ORAL EVERY 6 HOURS PRN
Status: DISCONTINUED | OUTPATIENT
Start: 2022-09-15 | End: 2022-09-16 | Stop reason: HOSPADM

## 2022-09-15 RX ORDER — MIDAZOLAM HYDROCHLORIDE 2 MG/2ML
INJECTION, SOLUTION INTRAMUSCULAR; INTRAVENOUS AS NEEDED
Status: DISCONTINUED | OUTPATIENT
Start: 2022-09-15 | End: 2022-09-15

## 2022-09-15 RX ORDER — SENNOSIDES 8.6 MG
650 CAPSULE ORAL EVERY 8 HOURS PRN
Qty: 30 TABLET | Refills: 0 | Status: SHIPPED | OUTPATIENT
Start: 2022-09-15 | End: 2022-09-21 | Stop reason: ALTCHOICE

## 2022-09-15 RX ORDER — DEXAMETHASONE SODIUM PHOSPHATE 10 MG/ML
INJECTION, SOLUTION INTRAMUSCULAR; INTRAVENOUS AS NEEDED
Status: DISCONTINUED | OUTPATIENT
Start: 2022-09-15 | End: 2022-09-15

## 2022-09-15 RX ORDER — LIDOCAINE HYDROCHLORIDE 20 MG/ML
INJECTION, SOLUTION EPIDURAL; INFILTRATION; INTRACAUDAL; PERINEURAL AS NEEDED
Status: DISCONTINUED | OUTPATIENT
Start: 2022-09-15 | End: 2022-09-15

## 2022-09-15 RX ORDER — ONDANSETRON 2 MG/ML
4 INJECTION INTRAMUSCULAR; INTRAVENOUS EVERY 6 HOURS PRN
Status: DISCONTINUED | OUTPATIENT
Start: 2022-09-15 | End: 2022-09-16 | Stop reason: HOSPADM

## 2022-09-15 RX ORDER — ONDANSETRON 2 MG/ML
INJECTION INTRAMUSCULAR; INTRAVENOUS AS NEEDED
Status: DISCONTINUED | OUTPATIENT
Start: 2022-09-15 | End: 2022-09-15

## 2022-09-15 RX ORDER — KETOROLAC TROMETHAMINE 30 MG/ML
INJECTION, SOLUTION INTRAMUSCULAR; INTRAVENOUS AS NEEDED
Status: DISCONTINUED | OUTPATIENT
Start: 2022-09-15 | End: 2022-09-15

## 2022-09-15 RX ORDER — HYDROCODONE BITARTRATE AND ACETAMINOPHEN 5; 325 MG/1; MG/1
1 TABLET ORAL EVERY 6 HOURS PRN
Qty: 6 TABLET | Refills: 0 | Status: CANCELLED | OUTPATIENT
Start: 2022-09-15 | End: 2022-09-25

## 2022-09-15 RX ADMIN — MIDAZOLAM HYDROCHLORIDE 2 MG: 1 INJECTION, SOLUTION INTRAMUSCULAR; INTRAVENOUS at 14:18

## 2022-09-15 RX ADMIN — FENTANYL CITRATE 50 MCG: 50 INJECTION, SOLUTION INTRAMUSCULAR; INTRAVENOUS at 14:19

## 2022-09-15 RX ADMIN — PROPOFOL 200 MG: 10 INJECTION, EMULSION INTRAVENOUS at 14:19

## 2022-09-15 RX ADMIN — HYDROMORPHONE HYDROCHLORIDE 0.5 MG: 1 INJECTION, SOLUTION INTRAMUSCULAR; INTRAVENOUS; SUBCUTANEOUS at 15:44

## 2022-09-15 RX ADMIN — CEFAZOLIN SODIUM 1000 MG: 1 SOLUTION INTRAVENOUS at 14:17

## 2022-09-15 RX ADMIN — FENTANYL CITRATE 25 MCG: 50 INJECTION, SOLUTION INTRAMUSCULAR; INTRAVENOUS at 14:49

## 2022-09-15 RX ADMIN — TRAMADOL HYDROCHLORIDE 50 MG: 50 TABLET, COATED ORAL at 16:23

## 2022-09-15 RX ADMIN — SODIUM CHLORIDE, SODIUM LACTATE, POTASSIUM CHLORIDE, AND CALCIUM CHLORIDE: .6; .31; .03; .02 INJECTION, SOLUTION INTRAVENOUS at 14:18

## 2022-09-15 RX ADMIN — FENTANYL CITRATE 25 MCG: 50 INJECTION, SOLUTION INTRAMUSCULAR; INTRAVENOUS at 14:52

## 2022-09-15 RX ADMIN — DEXAMETHASONE SODIUM PHOSPHATE 10 MG: 10 INJECTION, SOLUTION INTRAMUSCULAR; INTRAVENOUS at 14:19

## 2022-09-15 RX ADMIN — HYDROMORPHONE HYDROCHLORIDE 0.5 MG: 1 INJECTION, SOLUTION INTRAMUSCULAR; INTRAVENOUS; SUBCUTANEOUS at 15:53

## 2022-09-15 RX ADMIN — ONDANSETRON 4 MG: 2 INJECTION INTRAMUSCULAR; INTRAVENOUS at 14:19

## 2022-09-15 RX ADMIN — KETOROLAC TROMETHAMINE 15 MG: 30 INJECTION, SOLUTION INTRAMUSCULAR; INTRAVENOUS at 14:19

## 2022-09-15 RX ADMIN — LIDOCAINE HYDROCHLORIDE 100 MG: 20 INJECTION, SOLUTION EPIDURAL; INFILTRATION; INTRACAUDAL at 14:19

## 2022-09-15 NOTE — OP NOTE
OPERATIVE REPORT  PATIENT NAME: She Sidhu  :  1985  MRN: [id number]  Pt Location: [facility]    SURGERY DATE: 09/15/22    Surgeon(s) and Role:     * Philippe Almaraz MD - Primary     * Candida Xie PA-C - Assisting    Pre-Op Diagnosis:  Closed displaced fracture of base of fourth metacarpal bone of left hand, initial encounter [S6 315A]  Closed dislocation of fifth metacarpal bone of left hand with associated hamate fracture    Post-Op Diagnosis:   Closed displaced fracture of base of fourth metacarpal bone of left hand, initial encounter [S6 315A]  Closed dislocation of fifth metacarpal bone of left hand with associated hamate fracture    Procedure(s) (LRB):  Closed reducation w/percutaneous pinning left ring and small finger metacarpal fracture dislocations (Left)  -specifically the base of the ring finger fracture dislocation as well as the hamate fracture with dislocation of the small finger carpometacarpal joint  Specimen(s):  * No orders in the log *    Estimated Blood Loss:   Minimal      Anesthesia Type:   General    Operative Indications: The patient has a history of fracture dislocation of the base of the ring finger metacarpal as well as fracture of the hamate with dislocation of the small finger metacarpal carpometacarpal joint that was recalcitrant to conservative management  The decision was made to bring the patient to the operating room for closed versus open reduction and percutaneous pin fixation  Risks of the procedure were explained which include, but are not limited to bleeding; infection; damage to nerves, arteries,veins, tendons; scar; pain; need for reoperation; failure to give desired result; and risks of anaesthesia  All questions were answered to satisfaction and they were willing to proceed           Operative Findings:  Intra-articular fracture the base of the ring finger metacarpal with dorsal metacarpal dislocation of the carpometacarpal joint  Dorsal hamate fracture with dorsal dislocation of the small finger metacarpal at the carpometacarpal joint    Complications:   None    Procedure and Technique:  After the patient, site, and procedure were identified, the patient was brought into the operating room in a supine position  General anaesthesia and local medication were provided  A tourniquet was not used  The  left upper extremity was then prepped and drapped in a normal, sterile, orthopedic fashion  After the patient, site, and procedure identified attention was turned towards the left hand  Closed reduction was then performed and confirmed with PA and lateral radiographic imaging to the left hand  At this point time, we were able to undergo closed reduction percutaneous pin fixation  A 0 045 in C-wire was then inserted in a retrograde direction down the intramedullary canal of the small finger  With the finger in a reduced position, we then advanced this across the base of the small finger metacarpal and into the hamate  This was then cut, bent, and pin caps were placed  AP, lateral, and oblique x-rays confirmed good reduction of the small finger metacarpal dislocation and the hamate fracture  Attention was then turned towards the ring finger  A 2nd 0 045 in C-wire was then inserted in a retrograde direction down the intramedullary canal of the ring finger metacarpal   This then secured after reduction this to the base portion of the ring finger metacarpal, we then advanced this across and into the capitate securing the dislocation of the ring finger carpometacarpal joint  The pin was then cut, bent, and pin caps were placed  A 3rd 0 045 in C-wire was then inserted cross-pinning the small and ring finger metacarpals together  This was then cut, bent, and pin caps were placed  Stress radiographs as well as AP and lateral and oblique radiographs of the hand were taken which confirmed good reduction and fixation without redislocation        Sterile dressings were applied, including Xeroform, gauze, tweeners, webril, ACE and an ulnar gutter Splint  Please note, all sponge, needle, and instrument counts were correct prior to closure  Loupe magnification was utilized  The patient tolerated the procedure well       I was present for all critical portions of the procedure, A qualified resident physician was not available and A physician assistant was required during the procedure for retraction tissue handling,dissection and suturing    Patient Disposition:  PACU , hemodynamically stable and extubated and stable    SIGNATURE: Kacie Franks MD  DATE: 09/15/22  TIME: 3:04 PM

## 2022-09-15 NOTE — ANESTHESIA POSTPROCEDURE EVALUATION
Post-Op Assessment Note    CV Status:  Stable  Pain Score: 0    Pain management: adequate     Mental Status:  Awake   Hydration Status:  Stable   PONV Controlled:  None   Airway Patency:  Patent      Post Op Vitals Reviewed: Yes      Staff: CRNA         No complications documented      BP   117/77   Temp      Pulse 74   Resp 20   SpO2 100%

## 2022-09-15 NOTE — ANESTHESIA PREPROCEDURE EVALUATION
Procedure:  Closed vs open reducation and fixation left ring and small fingers metacarpal fracture dislocation (Left Finger)    Relevant Problems   CARDIO   (+) Thoracic back pain      MUSCULOSKELETAL   (+) Thoracic back pain      NEURO/PSYCH   (+) Focal epilepsy (HCC)        Physical Exam    Airway    Mallampati score: II  TM Distance: >3 FB  Neck ROM: full     Dental   No notable dental hx     Cardiovascular  Cardiovascular exam normal    Pulmonary  Pulmonary exam normal     Other Findings        Anesthesia Plan  ASA Score- 2     Anesthesia Type- general with ASA Monitors  Additional Monitors:   Airway Plan: LMA  Comment: I discussed risks (reviewed with patient on the anesthesia consent form), benefits and alternatives for General Anesthesia  These risks did include breathing tube remaining in place if not strong enough, PONV, damage to lips and teeth, sore throat, eye injury or blindness, risk of heart attack or stroke that may lead to death          Plan Factors-    Chart reviewed  Patient summary reviewed  Induction- intravenous  Postoperative Plan- Plan for postoperative opioid use  Informed Consent- Anesthetic plan and risks discussed with patient  I personally reviewed this patient with the CRNA  Discussed and agreed on the Anesthesia Plan with the CRNA  Jeremy Landon

## 2022-09-19 ENCOUNTER — TELEPHONE (OUTPATIENT)
Dept: OBGYN CLINIC | Facility: OTHER | Age: 37
End: 2022-09-19

## 2022-09-19 ENCOUNTER — APPOINTMENT (OUTPATIENT)
Dept: PHYSICAL THERAPY | Facility: CLINIC | Age: 37
End: 2022-09-19
Payer: COMMERCIAL

## 2022-09-19 DIAGNOSIS — S62.325A CLOSED DISPLACED FRACTURE OF SHAFT OF FOURTH METACARPAL BONE OF LEFT HAND, INITIAL ENCOUNTER: ICD-10-CM

## 2022-09-19 RX ORDER — HYDROCODONE BITARTRATE AND ACETAMINOPHEN 5; 325 MG/1; MG/1
1 TABLET ORAL EVERY 6 HOURS PRN
Qty: 5 TABLET | Refills: 0 | Status: SHIPPED | OUTPATIENT
Start: 2022-09-19 | End: 2022-09-24

## 2022-09-20 ENCOUNTER — APPOINTMENT (OUTPATIENT)
Dept: PHYSICAL THERAPY | Facility: CLINIC | Age: 37
End: 2022-09-20
Payer: COMMERCIAL

## 2022-09-21 ENCOUNTER — HOSPITAL ENCOUNTER (OUTPATIENT)
Dept: RADIOLOGY | Facility: HOSPITAL | Age: 37
Discharge: HOME | End: 2022-09-21
Attending: ORTHOPAEDIC SURGERY
Payer: COMMERCIAL

## 2022-09-21 ENCOUNTER — OFFICE VISIT (OUTPATIENT)
Dept: OBGYN CLINIC | Facility: HOSPITAL | Age: 37
End: 2022-09-21

## 2022-09-21 VITALS
WEIGHT: 163 LBS | SYSTOLIC BLOOD PRESSURE: 92 MMHG | HEART RATE: 59 BPM | HEIGHT: 67 IN | OXYGEN SATURATION: 96 % | BODY MASS INDEX: 25.58 KG/M2 | DIASTOLIC BLOOD PRESSURE: 62 MMHG

## 2022-09-21 DIAGNOSIS — Z48.89 AFTERCARE FOLLOWING SURGERY: ICD-10-CM

## 2022-09-21 DIAGNOSIS — S62.325S CLOSED DISPLACED FRACTURE OF SHAFT OF FOURTH METACARPAL BONE OF LEFT HAND, SEQUELA: ICD-10-CM

## 2022-09-21 DIAGNOSIS — Z48.89 AFTERCARE FOLLOWING SURGERY: Primary | ICD-10-CM

## 2022-09-21 PROCEDURE — 99024 POSTOP FOLLOW-UP VISIT: CPT | Performed by: ORTHOPAEDIC SURGERY

## 2022-09-21 PROCEDURE — 73130 X-RAY EXAM OF HAND: CPT

## 2022-09-21 NOTE — PROGRESS NOTES
Assessment:   S/P Closed reducation w/percutaneous pinning left ring and small finger metacarpal fracture dislocations - Left on 9/15/2022    Plan:   Splint removed  X-rays taken and reviewed  Physical exam performed  Overall radiographically and clinically he is doing well  He will be placed into a ulnar gutter cast today for the next 4+ weeks    Follow Up:  Return in about 4 weeks (around 10/19/2022) for re-check with x-rays after cast is removed but before pins are removed  To Do Next Visit:  Cast off, x-rays prior to pin removal      CHIEF COMPLAINT:  Chief Complaint   Patient presents with    Left Hand - Post-op, Suture / Staple Removal     S/P Closed reducation w/percutaneous pinning left ring and small finger metacarpal fracture dislocations (Left Finger) 9/15/22         SUBJECTIVE:  Therese Kerns is a 39 y o  male who presents for follow up after Closed reducation w/percutaneous pinning left ring and small finger metacarpal fracture dislocations - Left on 9/15/2022  He has been compliant with his postoperative splint  No complaints while being in his splint  PHYSICAL EXAMINATION:  Vital signs: BP 92/62   Pulse 59   Ht 5' 7" (1 702 m)   Wt 73 9 kg (163 lb)   SpO2 96%   BMI 25 53 kg/m²   General: well developed and well nourished, alert, oriented times 3 and appears comfortable  Psychiatric: Normal    MUSCULOSKELETAL EXAMINATION:  Incision:  3 pin sites are healing appropriately with pins in acceptable position alignment  No signs of infection  No drainage    Range of Motion: As expected  Neurovascular status: Neuro intact, good cap refill  Activity Restrictions: Cast/splint restrictions        STUDIES REVIEWED:  Images were reviewed in PACS by Dr Bryant Llanes and demonstrate:  Left hand x-rays taken and reviewed in the office today show:  Well aligned ring finger metacarpal joint as well as hamate fracture status post close reduction percutaneous pinning with 3 pins in excellent position alignment  PROCEDURES PERFORMED:  Cast application    Date/Time: 9/21/2022 10:23 AM  Performed by: Virgen Jurado MD  Authorized by: Virgen Jurado MD   Universal Protocol:  Consent: Verbal consent obtained  Risks and benefits: risks, benefits and alternatives were discussed  Consent given by: patient  Time out: Immediately prior to procedure a "time out" was called to verify the correct patient, procedure, equipment, support staff and site/side marked as required  Timeout called at: 9/21/2022 10:17 AM   Patient understanding: patient states understanding of the procedure being performed  Radiology Images displayed and confirmed  If images not available, report reviewed: imaging studies available  Patient identity confirmed: verbally with patient      Pre-procedure details:     Sensation:  Normal  Procedure details:     Laterality:  Left    Location:  Hand    Hand:  L handCast type: Ulnar gutter      Supplies:  Cotton padding and fiberglass            Scribe Attestation    I,:  Felisa Dooley am acting as a scribe while in the presence of the attending physician :       I,:  Virgen Jurado MD personally performed the services described in this documentation    as scribed in my presence :           Scribe Attestation    I,:  Felisa Dooley am acting as a scribe while in the presence of the attending physician :       I,:  Virgen Jurado MD personally performed the services described in this documentation    as scribed in my presence :

## 2022-09-22 ENCOUNTER — TELEPHONE (OUTPATIENT)
Dept: NEUROLOGY | Facility: CLINIC | Age: 37
End: 2022-09-22

## 2022-09-22 ENCOUNTER — APPOINTMENT (OUTPATIENT)
Dept: PHYSICAL THERAPY | Facility: CLINIC | Age: 37
End: 2022-09-22
Payer: COMMERCIAL

## 2022-09-22 NOTE — TELEPHONE ENCOUNTER
Since I haven't seen him and he was seeing another neurologist, I would like to see him for an update before making any changes  Would he be able to do a virtual visit in the afternoon (preferably after 2pm) on Friday 9/23? I am ccinela Doshi to see if she can help arrange

## 2022-09-22 NOTE — TELEPHONE ENCOUNTER
Pt left VM re: seizure activity  (9/10) and 9/12 or 13 (pt unsure)    Seizure description: "I felt like I was disappearing, profusely sweating and felt like I was dying    I knew it was my Aura, and I was fighting really hard to stay conscious   but I did I lose consciousness " "I was in and out and I also could not move a muscle in my body after regaining consciousness"  Witnessed? YES, by wife, son  and EMT's  Duration? 3-5 minutes maybe more  Multiple Seizures? No  Brought to the ER? Yes, [facility] [place], not admitted  Given Valproate IV and D/c'd   Usual type of seizure? No, sweating was profuse like Hypoglycemia, however BS was 117  Pt not Diabetic, but was told at one time post seizure that his BS was 56  Sleep deprivation? NO  Missed Medications? YES, night before and morning of missed Depakote (500 BID)  Recently/Currently ill? No  Any new medications? No  ETOH or Drug use? No  New Stressors? Yes, both financial r/t not working and pending disability case  Current Medications confirmed as:  Depakote 500 mg BID (per pt), per chart review Depakote 750 BID  Spoke to pt about length of time since last being seen by our providers (not since 12/28/21), and the fact that he appears to be with another practice [facility])  Pt states he hasn't seen us since Dr Tiffanie Prado discussed EMU admission with him, and he was really uneasy about it and stopped coming after that, but his PCP referred him to [place] as PCP was concerned about him stopping treatment/medication  Pt reports that he was happy with they way Dr Tiffanie Prado helped him with this issue in the past, it was only the EMU that scared him off as he hates being in the hospital  Pt would like to f/u with our practice and assures that he will show up to an appt if we schedule it

## 2022-09-23 ENCOUNTER — TELEMEDICINE (OUTPATIENT)
Dept: NEUROLOGY | Facility: CLINIC | Age: 37
End: 2022-09-23
Payer: COMMERCIAL

## 2022-09-23 DIAGNOSIS — Z79.899 ENCOUNTER FOR LONG-TERM (CURRENT) USE OF HIGH-RISK MEDICATION: Primary | ICD-10-CM

## 2022-09-23 DIAGNOSIS — G40.109 FOCAL EPILEPSY (HCC): ICD-10-CM

## 2022-09-23 PROCEDURE — 99215 OFFICE O/P EST HI 40 MIN: CPT | Performed by: PSYCHIATRY & NEUROLOGY

## 2022-09-23 RX ORDER — DIVALPROEX SODIUM 250 MG/1
TABLET, EXTENDED RELEASE ORAL
Qty: 60 TABLET | Refills: 5 | Status: SHIPPED | OUTPATIENT
Start: 2022-09-23

## 2022-09-23 RX ORDER — DIVALPROEX SODIUM 500 MG/1
TABLET, EXTENDED RELEASE ORAL
Qty: 60 TABLET | Refills: 5 | Status: SHIPPED | OUTPATIENT
Start: 2022-09-23

## 2022-09-23 NOTE — TELEPHONE ENCOUNTER
I was able to speak with Pj about scheduling a virtual visit with Dr Veronica Ruiz today at 2pm  Patient accepted appointment  Jaylene - Can you assist scheduling this patient? Thank you

## 2022-09-23 NOTE — PATIENT INSTRUCTIONS
-- Please increase your Divalproex ER (Depakote) to be 750 mg twice a day       -- I will order some blood work, that I would like you to get checked in about 2-3 weeks

## 2022-09-23 NOTE — PROGRESS NOTES
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, who is returning for follow-up of his seizures  Assessment/Plan:    No problem-specific Assessment & Plan notes found for this encounter  He will No follow-ups on file  Reason for visit is   Chief Complaint   Patient presents with    Virtual Regular Visit        Encounter provider Alex Mullins MD    Provider located at [facility] [id number]      Recent Visits  Date Type Provider Dept   09/22/22 Telephone Juan C Howard RN [facility] recent visits within past 7 days and meeting all other requirements  Future Appointments  No visits were found meeting these conditions  Showing future appointments within next 150 days and meeting all other requirements       The patient was identified by name and date of birth  Therese Kerns was informed that this is a telemedicine visit and that the visit is being conducted through [address] and patient was informed this is a secure, HIPAA-complaint platform  He agrees to proceed     My office door was closed  No one else was in the room  He acknowledged consent and understanding of privacy and security of the video platform  The patient has agreed to participate and understands they can discontinue the visit at any time  Patient is aware this is a billable service  Subjective  Therese Kerns is a 39 y o  male     Current seizure medications:  1  Divalproex  mg twice a day  Other medications as per Epic  Since his last visit, he was unfortunately lost to follow up with our office   His PCP requested that he see another Neurologist with [facility]  He was planned to start taking oxcarbazepine because he was still having events, but Pj reports today that he never started taking it  He will still have a focal seizure about 1-2 times a month where he will space out  He often doesn't get a warning  These are consistent with the focal impaired aware seizures described at prior visits  Afterwards he may gasp for air  He did have a focal to bilateral tonic clonic seizure on 9/10  He was walking into the kitchen, then felt a strong aura like everything was exiting his body  He knew something was about to happen, he called to his wife, drank orange juice, then was sweating profusely  He then lost consciousness  His wife said he mainly fell limp to the ground  No shaking  He next remembers sitting on a chair  After the seizure, he wasn't able to move his legs  He reported feeling that his head was really hot  He remembers then being in and out of consciousness after that point  EMS said as if he was having tremors at the time  He was taken to [facility] and there his Valproate level was low at 23  He does admit that he missed some doses of medications the night before and on the day he had the seizure  He has another seizure on 9/15  He had an aura, was trying to fight it off, then said something was happening, then froze, was not responding and had shaking of his hand  He was crying, appeared to not be able to breath  He remembers his jaw and his arms were really sore after this  He recalls being on 750 mg twice a day, but it is unclear why, but he has recently been just taking 500 mg twice a day  He denies any side effects to the Divalproex  They deny any problems with mood and feel he has been tolerating Divalproex without any side effects    Prior Medications: Levetiracetam (Mood changes)    His history was also obtained from his wife, who was also present via [facility]     I reviewed prior notes including prior hospitalization notes, as documented in Epic/[facility], and summarized above  Winsome Mark PRASAD     Past Medical History:   Diagnosis Date    Anxiety     Brain bleed ([facility] [address] ) 2012    Compression fracture of body of thoracic vertebra (HCC)     Epilepsy (HCC)     GERD (gastroesophageal reflux disease)     Migraine     Seizures (HCC)        Past Surgical History:   Procedure Laterality Date    EPIDURAL BLOCK INJECTION N/A 2/11/2021    Procedure: T9 T10 INTERLAMINAR THORACIC EPIDURAL STEROID INJECTION;  Surgeon: Eduardo Perez MD;  [facility]: [facility];  Service: Pain Management     FINGER SURGERY Right     5th digit    NERVE BLOCK Bilateral 1/21/2021    Procedure: T7 T8 T9 T10 MEDIAL BRANCH BLOCK #1;  Surgeon: Eduardo Perez MD;  Location: [facility];  Service: Pain Management     UT OPEN TX PHALANGEAL SHAFT FRACTURE PROX/MIDDLE EA Left 9/15/2022    Procedure: Closed reducation w/percutaneous pinning left ring and small finger metacarpal fracture dislocations;  Surgeon: Awa Smith MD;  Location:  MAIN OR;  Service: Orthopedics    WISDOM TOOTH EXTRACTION         Current Outpatient Medications   Medication Sig Dispense Refill    acetaminophen (TYLENOL) 500 mg tablet Take 500 mg by mouth      cyclobenzaprine (FLEXERIL) 5 mg tablet Take 5 mg by mouth 3 (three) times a day as needed      divalproex sodium (Depakote ER) 500 mg 24 hr tablet Take 1 tab (500 mg) nightly for 1 week, then take 1 tab (500 mg) twice a day 60 tablet 11    divalproex sodium (Depakote) 250 mg EC tablet Take 1 tablet (250 mg total) by mouth every 12 (twelve) hours Take in addition to one 500 mg tablet twice per day for a total of 750 mg twice per day   180 tablet 3    HYDROcodone-acetaminophen (NORCO) 5-325 mg per tablet Take 1 tablet by mouth every 6 (six) hours as needed for pain for up to 5 days Max Daily Amount: 4 tablets 5 tablet 0    methylPREDNISolone 4 MG tablet therapy pack       Naproxen Sodium 220 MG CAPS Take 1 capsule (220 mg total) by mouth 2 (two) times a day 60 capsule 0    ascorbic acid (VITAMIN C) 250 mg tablet Take 250 mg by mouth daily (Patient not taking: No sig reported)      b complex vitamins tablet Take 1 tablet by mouth daily (Patient not taking: No sig reported)      Cholecalciferol 25 MCG (1000 UT) tablet Take 1,000 Units by mouth (Patient not taking: No sig reported)      Magnesium 500 MG CAPS Take by mouth (Patient not taking: No sig reported)      Multiple Vitamin (multivitamin) capsule Take 1 capsule by mouth daily (Patient not taking: No sig reported)       No current facility-administered medications for this visit  Allergies   Allergen Reactions    Sumatriptan Rash     Burning sensation   skin gets warm  skin gets warm         Review of Systems   Constitutional: Negative  Negative for appetite change and fever  HENT: Negative  Negative for hearing loss, tinnitus, trouble swallowing and voice change  Eyes: Negative  Negative for photophobia and pain  Respiratory: Negative  Negative for shortness of breath  Cardiovascular: Negative  Negative for palpitations  Gastrointestinal: Negative  Negative for nausea and vomiting  Endocrine: Negative  Negative for cold intolerance  Genitourinary: Negative  Negative for dysuria, frequency and urgency  Musculoskeletal: Negative  Negative for myalgias and neck pain  Skin: Negative  Negative for rash  Neurological: Positive for seizures (9/15 in the evening)  Negative for dizziness, tremors, syncope, facial asymmetry, speech difficulty, weakness, light-headedness, numbness and headaches  Hematological: Negative  Does not bruise/bleed easily  Psychiatric/Behavioral: Negative  Negative for confusion, hallucinations and sleep disturbance  All other systems reviewed and are negative  Video Exam    There were no vitals filed for this visit      Physical Exam     I spent 27 minutes directly with the patient during this visit, but spent an additional 15 min reviewing his recent history, hospital notes and completing documentation on the day of the encounter

## 2022-09-23 NOTE — ASSESSMENT & PLAN NOTE
When getting blood work, I will check a vitamin D level  If this is low, we will need to start vitamin D supplement  Also, he likely would benefit from a bone density scan, especially given his history of fractures

## 2022-09-23 NOTE — ASSESSMENT & PLAN NOTE
As noted previously, he does have some features of his events that would be atypical for epileptic seizures, but his events do appear to have responded to seizure medications  Additionally, his more severe event occurred after he missed doses of his Divalproex  He also is still having events that would be consistent with focal impaired aware seizures around 1-2 times a month  I discussed the important of both medication compliance and the need for regular neurologic follow up  He does want to continue to follow in our office, so I will plan to continue to adjust and monitor his medications  -- I will have him increase his Divalproex back up to his prior dose of 750 mg twice a day  I will have him get a level checked in about 2-3 weeks  If he is still having events, if level is less than 100, we may have room to increase this further  -- if he continues to have events despite optimizing Divalproex, we may consider a different medication  However if events still continue, he may benefit from EMU admission to better characterize his events and consider other treatment options  -- I discussed driving restrictions in [place]  He does not drive

## 2022-10-11 ENCOUNTER — TELEPHONE (OUTPATIENT)
Dept: OBGYN CLINIC | Facility: HOSPITAL | Age: 37
End: 2022-10-11

## 2022-10-11 NOTE — TELEPHONE ENCOUNTER
Patient given above information and he states that he is having intermittent pain when he goes to  with the hand  - he gets a jolt of pain to top part hand  He is not in pain at all currently while speaking to me  He feels a warm feeling around pins when the jolt happens, almost like it is a bleeding feeling around the pins  He sees not bleeding thru cast       He got the finger area a couple days ago but it dried and it is firm  He did trim some loose pieces around finger  Please advise

## 2022-10-11 NOTE — TELEPHONE ENCOUNTER
Patient does not want to go to ER in his area, wants Dr Rodriguez Artist to only evaluate  Appt provided for 11:15am tomorrow due to opening

## 2022-10-11 NOTE — TELEPHONE ENCOUNTER
Patient calling to let us know that he cannot make tomorrows appt due to other obligation  Prefers to see Dr Aaron Sierra as previously scheduled  He will go to ER should he have worsening of symptoms; Increased pain, purulent drainage, increased swelling, redness, heat to touch or fever

## 2022-10-11 NOTE — TELEPHONE ENCOUNTER
Caller: rachelle    Doctor: Baldo Amin    Reason for call: Patient is having pain :    Patient's location of pain is: hand       Patient's pain scale (from 1-10): 10/10    Patient denies or confirms warmth, redness, and swelling around location: denies    Patient is taking pain medication(s):  Has taken tylenol w/ no relief    Patient is or is not icing: no    Patient denies or confirms have calf pain: no    Patient denies or confirms have chest pain: no     Patient denies or confirms have shortness of breath: no    Pt  Preferred Callback Phone Number: [phone]    patient calling to inform doctor that cast got wet a couple days ago states its not too bad and it did not get wet around the pins and does not feel like the cast is falling apart  *Patient states he can feel the pins in his bones and can hardly sleep because it hurts at night sometime he feels like he's bleeding and sometimes feels like his hand is swollen and tight inside   *

## 2022-10-19 ENCOUNTER — TELEPHONE (OUTPATIENT)
Dept: OBGYN CLINIC | Facility: CLINIC | Age: 37
End: 2022-10-19

## 2022-10-19 ENCOUNTER — HOSPITAL ENCOUNTER (OUTPATIENT)
Dept: RADIOLOGY | Facility: HOSPITAL | Age: 37
Discharge: HOME | End: 2022-10-19
Attending: ORTHOPAEDIC SURGERY
Payer: COMMERCIAL

## 2022-10-19 ENCOUNTER — OFFICE VISIT (OUTPATIENT)
Dept: OBGYN CLINIC | Facility: HOSPITAL | Age: 37
End: 2022-10-19

## 2022-10-19 ENCOUNTER — OFFICE VISIT (OUTPATIENT)
Dept: OCCUPATIONAL THERAPY | Facility: HOSPITAL | Age: 37
End: 2022-10-19
Attending: ORTHOPAEDIC SURGERY
Payer: COMMERCIAL

## 2022-10-19 VITALS
HEART RATE: 64 BPM | HEIGHT: 67 IN | BODY MASS INDEX: 25.33 KG/M2 | WEIGHT: 161.4 LBS | SYSTOLIC BLOOD PRESSURE: 100 MMHG | DIASTOLIC BLOOD PRESSURE: 62 MMHG

## 2022-10-19 DIAGNOSIS — S62.315A CLOSED DISPLACED FRACTURE OF BASE OF FOURTH METACARPAL BONE OF LEFT HAND, INITIAL ENCOUNTER: ICD-10-CM

## 2022-10-19 DIAGNOSIS — Z48.89 AFTERCARE FOLLOWING SURGERY: ICD-10-CM

## 2022-10-19 DIAGNOSIS — S62.325A CLOSED DISPLACED FRACTURE OF SHAFT OF FOURTH METACARPAL BONE OF LEFT HAND, INITIAL ENCOUNTER: ICD-10-CM

## 2022-10-19 DIAGNOSIS — S63.267A CLOSED DISLOCATION OF FIFTH METACARPAL BONE OF LEFT HAND: ICD-10-CM

## 2022-10-19 DIAGNOSIS — S62.392D CLOSED NONDISPLACED FRACTURE OF OTHER PART OF THIRD METACARPAL BONE OF RIGHT HAND WITH ROUTINE HEALING, SUBSEQUENT ENCOUNTER: ICD-10-CM

## 2022-10-19 DIAGNOSIS — Z48.89 AFTERCARE FOLLOWING SURGERY: Primary | ICD-10-CM

## 2022-10-19 DIAGNOSIS — Z98.890 STATUS POST MUSCULOSKELETAL SYSTEM SURGERY: Primary | ICD-10-CM

## 2022-10-19 DIAGNOSIS — S62.325S CLOSED DISPLACED FRACTURE OF SHAFT OF FOURTH METACARPAL BONE OF LEFT HAND, SEQUELA: ICD-10-CM

## 2022-10-19 PROCEDURE — 97760 ORTHOTIC MGMT&TRAING 1ST ENC: CPT

## 2022-10-19 PROCEDURE — 99024 POSTOP FOLLOW-UP VISIT: CPT | Performed by: ORTHOPAEDIC SURGERY

## 2022-10-19 PROCEDURE — 73130 X-RAY EXAM OF HAND: CPT

## 2022-10-19 NOTE — PROGRESS NOTES
Assessment:   S/P Closed reducation w/percutaneous pinning left ring and small finger metacarpal fracture dislocations - Left on 9/15/2022    Plan:   Patient will meet with the OT in the office for a splint to wear for the next 10 days  He will begin formal OT for finger ROM for the next 6 weeks  1lb lifting restriction  Follow Up:  6  week(s)    To Do Next Visit:  X-rays of the  left  hand      CHIEF COMPLAINT:  Chief Complaint   Patient presents with   • Left Ring Finger - Post-op, Pin out after XR , Cast Removal     S/P Closed reducation w/percutaneous pinning left ring and small finger metacarpal fracture dislocations - Left on 9/15/2022         SUBJECTIVE:  Rea Arana is a 39 y o  male who presents for follow up after Closed reducation w/percutaneous pinning left ring and small finger metacarpal fracture dislocations - Left on 9/15/2022  Today patient has No Complaints  PHYSICAL EXAMINATION:  Vital signs: /62   Pulse 64   Ht 5' 7" (1 702 m)   Wt 73 2 kg (161 lb 6 4 oz)   BMI 25 28 kg/m²   General: well developed and well nourished, alert, oriented times 3 and appears comfortable  Psychiatric: Normal    MUSCULOSKELETAL EXAMINATION:  Incision: pin sites are clean  Range of Motion: As expected  Neurovascular status: Neuro intact, good cap refill  Activity Restrictions: Cast/splint restrictions  Done today: Pin removed and Cast removed      STUDIES REVIEWED:  Images were reviewed in PACS by Dr Aaron Sierra and demonstrate: xray of left hand on 10/19/;2022 demonstrates routine healing of small and ring metacarpal fractures         PROCEDURES PERFORMED:  Procedures  No Procedures performed today   Scribe Attestation    I,:  Darshana Viveros am acting as a scribe while in the presence of the attending physician :       I,:  Ligia Grajeda MD personally performed the services described in this documentation    as scribed in my presence :

## 2022-10-19 NOTE — TELEPHONE ENCOUNTER
----- Message from Jose Raul Callahan sent at 10/19/2022 11:32 AM EDT -----  Dr Khushi Wells wants this patient back in 6 weeks for a Post Op appointment      [phone]    Thank you

## 2022-10-19 NOTE — PROGRESS NOTES
Orthosis    Diagnosis:   1  Status post musculoskeletal system surgery       Indication: Fracture and Motion Blocking    Location: Left  wrist and hand  Supplies: Custom Fit Orthotic  Orthosis type: Volar Hand-Wrist  Wearing Schedule: Remove for hygiene only  Describe Position: Wrist in neutral    Precautions: Universal (skin contact/breakdown)    Patient or Caregiver expresses understanding of wearing Schedule and Precautions? Yes  Patient or Caregiver able to don/doff orthotic independently? Yes    Written orders provided to patient?  No  Orders Obtained: Verbal  Orders Obtained from: Camelia Norris    Return for evaluation and treatment Yes

## 2022-10-19 NOTE — TELEPHONE ENCOUNTER
LMOM  to schedule, if pt calls back main line please send me a teams message to ensure I am available and then transfer pt to my line [phone]  Thank you

## 2022-11-07 NOTE — PROGRESS NOTES
PT Evaluation     Today's date: 2022  Patient name: Nancie Naylor  : 1985  MRN: [id number]  Referring provider: Laila Aly MD  Dx:   Encounter Diagnosis     ICD-10-CM    1  Closed displaced fracture of shaft of fourth metacarpal bone of left hand with routine healing, subsequent encounter  S62 325D    2  Dislocation of metacarpophalangeal joint of left little finger, subsequent encounter  S63 267D                   Assessment  Assessment details: Pt is a 45 YO /male presenting to PT with pain, decreased AROM, strength and tolerance to activity  Pt would benefit from skilled intervention to address these issues and maximize overall function  Occupation- not working  Dominant- right; Involved- left      Goals  ST  Decrease pain to 0-2/10 in 4 weeks            2  Decrease swelling left hand            3  Increase AROM to [facility] in 6-8 weeks            4   Provide orthotic for protection  LT  Increase functional motion and strength for independence with ADL and self care by DC            2  Ability to RT recreational activity by DC    Plan  Patient would benefit from: skilled physical therapy  Planned modality interventions: cryotherapy, thermotherapy: hydrocollator packs and ultrasound  Planned therapy interventions: activity modification, manual therapy, neuromuscular re-education, strengthening, stretching, therapeutic activities, therapeutic exercise and home exercise program  Frequency: 2x week  Duration in weeks: 4  Treatment plan discussed with: patient        Subjective Evaluation    History of Present Illness  Date of onset: 9/10/2022  Date of surgery: 9/15/2022  Mechanism of injury: Pt injured when a weight fell on the back of his hand causing fractures to the left RF and SF  Pt underwent closed percutaneous pinning 9/15/22  Pt placed in an ulnar gutter cast 22, followed by pin removal and splint fabrication 10/19/22  Pt is on a 1# lift restriction    Pain  No pain reported    Hand dominance: right    Treatments  Current treatment: physical therapy  Patient Goals  Patient goals for therapy: decreased edema, decreased pain, increased motion, increased strength, independence with ADLs/IADLs and return to sport/leisure activities          Objective     General Comments:      Wrist/Hand Comments  AROM left wrist E/F- 60/70; digits- composite fist and extension  Strength-  II- unassessed; 3 YULIA- 10/22; Key- 16/22  Sensation- no tingling noted  Circumference at wrist- 17 5 cm; MPs 21 0 cm      Flowsheet Rows    Flowsheet Row Most Recent Value   PT/OT G-Codes    Current Score 62   Projected Score 75             Precautions: wear orthosis for protection      Manuals 11/9            STM 15                                                   Neuro Re-Ed             HP/pulsed biph 15                                      Ther Ex             TP  T/T 2'            Tp 5 finger T/Y 2'            Gaudencio 20 2/10            Blue L II 2/10            DB E/F 3 2/10                                                                                                                                 Modalities             US 15

## 2022-11-09 ENCOUNTER — EVALUATION (OUTPATIENT)
Dept: PHYSICAL THERAPY | Facility: CLINIC | Age: 37
End: 2022-11-09

## 2022-11-09 DIAGNOSIS — S63.267D: ICD-10-CM

## 2022-11-09 DIAGNOSIS — S62.325D CLOSED DISPLACED FRACTURE OF SHAFT OF FOURTH METACARPAL BONE OF LEFT HAND WITH ROUTINE HEALING, SUBSEQUENT ENCOUNTER: Primary | ICD-10-CM

## 2022-11-11 ENCOUNTER — APPOINTMENT (OUTPATIENT)
Dept: PHYSICAL THERAPY | Facility: CLINIC | Age: 37
End: 2022-11-11

## 2022-11-16 ENCOUNTER — OFFICE VISIT (OUTPATIENT)
Dept: PHYSICAL THERAPY | Facility: CLINIC | Age: 37
End: 2022-11-16

## 2022-11-16 DIAGNOSIS — S62.325D CLOSED DISPLACED FRACTURE OF SHAFT OF FOURTH METACARPAL BONE OF LEFT HAND WITH ROUTINE HEALING, SUBSEQUENT ENCOUNTER: Primary | ICD-10-CM

## 2022-11-16 DIAGNOSIS — S63.267D: ICD-10-CM

## 2022-11-16 NOTE — PROGRESS NOTES
Daily Note     Today's date: 2022  Patient name: Mainor Strickland  : 1985  MRN: [id number]  Referring provider: Adeola Kyle MD  Dx:   Encounter Diagnosis     ICD-10-CM    1  Closed displaced fracture of shaft of fourth metacarpal bone of left hand with routine healing, subsequent encounter  S62 325D       2  Dislocation of metacarpophalangeal joint of left little finger, subsequent encounter  S63 267D                      Subjective: Pt reports his hand "feels pretty good" with the recent therapy  Objective: See treatment diary below    Wrist/Hand Comments  AROM left wrist E/F- 60/70; digits- composite fist and extension  Strength-  II- unassessed; 3 YULIA- 10/22; Key-   Sensation- no tingling noted  Circumference at wrist- 17 5 cm; MPs 21 0 cm      Assessment: Tolerated treatment well today  Minimal complaints during session  Plan: Progress treatment as tolerated         Precautions: wear orthosis for protection      Manuals            STM 15 15                                                  Neuro Re-Ed             HP/pulsed biph 15 15                                     Ther Ex             TP  T/T 2' T/Y 2'           Tp 5 finger T/Y 2' T/Y 2'           Gaudencio 20 10 20 2/10           Blue L II 2/10 LII /10           DB E/F 3 10 3 2/10                                                                                                                                Modalities             US 15 15

## 2022-11-18 ENCOUNTER — APPOINTMENT (OUTPATIENT)
Dept: PHYSICAL THERAPY | Facility: CLINIC | Age: 37
End: 2022-11-18

## 2022-11-21 ENCOUNTER — APPOINTMENT (OUTPATIENT)
Dept: PHYSICAL THERAPY | Facility: CLINIC | Age: 37
End: 2022-11-21

## 2022-11-23 ENCOUNTER — APPOINTMENT (OUTPATIENT)
Dept: PHYSICAL THERAPY | Facility: CLINIC | Age: 37
End: 2022-11-23

## 2022-11-30 ENCOUNTER — OFFICE VISIT (OUTPATIENT)
Dept: PHYSICAL THERAPY | Facility: CLINIC | Age: 37
End: 2022-11-30

## 2022-11-30 DIAGNOSIS — S62.325D CLOSED DISPLACED FRACTURE OF SHAFT OF FOURTH METACARPAL BONE OF LEFT HAND WITH ROUTINE HEALING, SUBSEQUENT ENCOUNTER: Primary | ICD-10-CM

## 2022-11-30 DIAGNOSIS — S63.267D: ICD-10-CM

## 2022-11-30 NOTE — PROGRESS NOTES
Daily Note     Today's date: 2022  Patient name: Yuval Freed  : 1985  MRN: [id number]  Referring provider: Ellie Zayas MD  Dx:   Encounter Diagnosis     ICD-10-CM    1  Closed displaced fracture of shaft of fourth metacarpal bone of left hand with routine healing, subsequent encounter  S62 325D       2  Dislocation of metacarpophalangeal joint of left little finger, subsequent encounter  S63 267D                      Subjective: Pt reports he bumped his knuckle on table a couple days ago and feels slightly sore but it is improved since that day  Objective: See treatment diary below  AROM left wrist E/F- 60/70; digits- composite fist and extension  Strength-  II- unassessed; 3 YULIA- 10/22; Key-   Sensation- no tingling noted  Circumference at wrist- 17 5 cm; MPs 21 0 cm    Assessment: Pt with good tolerance to treatment overall without significant complaints  Pt responds well to manual therapy  Pt will benefit from continued skilled PT  Plan: Continue per plan of care        Precautions: wear orthosis for protection      Manuals           STM 15 15 15                                                 Neuro Re-Ed             HP/pulsed biph 15 15 15                                    Ther Ex             TP  T/T 2' T/Y 2' T/Y 2'          Tp 5 finger T/Y 2' T/Y 2' T/Y 2'          Gaudencio 20 2/10 20 2/10 20 2/10          Blue L II 2/10 LII 2/10 L II 2/10          DB E/F 3 2/10 3 2/10 3 2/10                                                                                                                               Modalities              15 15 15

## 2022-12-02 ENCOUNTER — APPOINTMENT (OUTPATIENT)
Dept: PHYSICAL THERAPY | Facility: CLINIC | Age: 37
End: 2022-12-02

## 2022-12-05 ENCOUNTER — OFFICE VISIT (OUTPATIENT)
Dept: PHYSICAL THERAPY | Facility: CLINIC | Age: 37
End: 2022-12-05

## 2022-12-05 DIAGNOSIS — S63.267D: ICD-10-CM

## 2022-12-05 DIAGNOSIS — S62.325D CLOSED DISPLACED FRACTURE OF SHAFT OF FOURTH METACARPAL BONE OF LEFT HAND WITH ROUTINE HEALING, SUBSEQUENT ENCOUNTER: Primary | ICD-10-CM

## 2022-12-05 NOTE — PROGRESS NOTES
Daily Note     Today's date: 2022  Patient name: Kavon Payne  : 1985  MRN: [id number]  Referring provider: Alida Ny MD  Dx:   Encounter Diagnosis     ICD-10-CM    1  Closed displaced fracture of shaft of fourth metacarpal bone of left hand with routine healing, subsequent encounter  S62 325D       2  Dislocation of metacarpophalangeal joint of left little finger, subsequent encounter  S63 267D                      Subjective: Pt reports the hand is "ok" "I am ding things with it but I suffered a seizure the other day and wasn't doing anything for a few days  Objective: See treatment diary below  AROM left wrist E/F- 60/70; digits- composite fist and extension  Strength-  II- unassessed; 3 YULIA- 10/22; Key-   Sensation- no tingling noted  Circumference at wrist- 17 5 cm; MPs 21 0 cm      Assessment: Tolerated treatment well today  Pt progressed today without complaints  Plan: Progress treatment as tolerated         Precautions: wear orthosis for protection      Manuals          STM 15 15 15 15                                                Neuro Re-Ed             HP/pulsed biph 15 15 15 15                                   Ther Ex             TP  T/T 2' T/Y 2' T/Y 2' T/Y 2'         Tp 5 finger T/Y 2' T/Y 2' T/Y 2' T/Y 2'         Gaudencio 20 2/10 20 2/10 20 2/10 25 2/10         Blue L II 2/10 LII 2/10 L II 2/10 LIII 2/10         DB E/F 3 2/10 3 2/10 3 2/10 4 2/10         S/P    1# 2/10                                                                                                                 Modalities              15 15 15

## 2022-12-07 ENCOUNTER — APPOINTMENT (OUTPATIENT)
Dept: PHYSICAL THERAPY | Facility: CLINIC | Age: 37
End: 2022-12-07

## 2022-12-09 ENCOUNTER — APPOINTMENT (OUTPATIENT)
Dept: PHYSICAL THERAPY | Facility: CLINIC | Age: 37
End: 2022-12-09

## 2022-12-12 ENCOUNTER — APPOINTMENT (OUTPATIENT)
Dept: PHYSICAL THERAPY | Facility: CLINIC | Age: 37
End: 2022-12-12

## 2022-12-14 ENCOUNTER — APPOINTMENT (OUTPATIENT)
Dept: PHYSICAL THERAPY | Facility: CLINIC | Age: 37
End: 2022-12-14

## 2022-12-19 ENCOUNTER — APPOINTMENT (OUTPATIENT)
Dept: PHYSICAL THERAPY | Facility: CLINIC | Age: 37
End: 2022-12-19

## 2022-12-21 ENCOUNTER — APPOINTMENT (OUTPATIENT)
Dept: PHYSICAL THERAPY | Facility: CLINIC | Age: 37
End: 2022-12-21

## 2022-12-26 ENCOUNTER — APPOINTMENT (OUTPATIENT)
Dept: PHYSICAL THERAPY | Facility: CLINIC | Age: 37
End: 2022-12-26

## 2022-12-28 ENCOUNTER — APPOINTMENT (OUTPATIENT)
Dept: PHYSICAL THERAPY | Facility: CLINIC | Age: 37
End: 2022-12-28

## 2023-01-13 ENCOUNTER — TELEPHONE (OUTPATIENT)
Dept: NEUROLOGY | Facility: CLINIC | Age: 38
End: 2023-01-13

## 2023-01-13 NOTE — TELEPHONE ENCOUNTER
LVM reminding patient of f/u visit with Dr Natan Andujar on 1/20 at 10:30a in [facility]  During my chart prep I discovered patient has established care with Dr Morenita Grimes and stated if patient wanted to cancel appointment due to transferring care to [facility] but he can absolutely continue care with Dr Natan Andujar

## 2023-02-24 ENCOUNTER — OFFICE VISIT (OUTPATIENT)
Dept: NEUROLOGY | Facility: CLINIC | Age: 38
End: 2023-02-24

## 2023-02-24 VITALS — SYSTOLIC BLOOD PRESSURE: 122 MMHG | DIASTOLIC BLOOD PRESSURE: 88 MMHG

## 2023-02-24 DIAGNOSIS — G40.109 FOCAL EPILEPSY (HCC): ICD-10-CM

## 2023-02-24 RX ORDER — DIVALPROEX SODIUM 500 MG/1
TABLET, EXTENDED RELEASE ORAL
Qty: 120 TABLET | Refills: 11 | Status: SHIPPED | OUTPATIENT
Start: 2023-02-24

## 2023-02-24 NOTE — PROGRESS NOTES
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, who is returning to Neurology office for follow up of his seizures  Assessment/Plan:    Focal epilepsy ([address] )  He has continued to have events concerning for seizures  His events do have features that would be concerning for focal seizures, but the description he provided today would also raise the possibility of nonepileptic events  His events did become more frequent when under more stress recently  I discussed that it will be important to continue to work with medications since Divalproex can help improve mood and can treat seizures, but I would also like to get some additional testing to confirm the etiology of his events  -- I will have him increase his Divalproex to be 1000 mg twice a day  I would like him to get a repeat set of blood work in about 2 weeks to reassess his medication level  -- I will also order a routine EEG to better characterize his events  If he continues to have seizures despite the above increase in medications and EEG is unremarkable, then he may benefit from admission to the EMU to better characterize his events  He will Return in about 3 months (around 5/24/2023)  Subjective:    HPI  Current seizure medications:  1  Divalproex 750 mg twice a day  Other medications as per Epic  Since his last visit, January was a bad month  He had a string of seizures  He may have had two on consecutive days  He was under a lot of stress due to pipes bursting and last minute needing to move to a new place  This month, he has been better  His most recent event was on 2/3/23  With the seizures, he initially felt dizzy and like "everything was exiting his feet and fingers"  He tried to get to the chair, then lost consciousness   His significant notes that he typically will be unresponsive and not move until waking, but with the event on 2/3, he also was shaking all over and appeared stiff all over  He typically will wake and will be confused afterwards  He may start crying because he realizes what happens  Prior Seizure Medications: Levetiracetam (Mood changes)    His history was also obtained from his significant other, who was present at today's visit  Objective:    Blood pressure 122/88  Physical Exam    Neurological Exam      ROS:    Review of Systems   Constitutional: Negative  Negative for appetite change and fever  HENT: Negative  Negative for hearing loss, tinnitus, trouble swallowing and voice change  Eyes: Negative  Negative for photophobia, pain and visual disturbance  Respiratory: Negative  Negative for shortness of breath  Cardiovascular: Negative  Negative for palpitations  Gastrointestinal: Negative  Negative for nausea and vomiting  Endocrine: Negative  Negative for cold intolerance  Genitourinary: Negative  Negative for dysuria, frequency and urgency  Musculoskeletal: Negative  Negative for gait problem, myalgias and neck pain  Skin: Negative  Negative for rash  Allergic/Immunologic: Negative  Neurological: Positive for seizures (last episode was 2/3 )  Negative for dizziness, tremors, syncope, facial asymmetry, speech difficulty, weakness, light-headedness, numbness and headaches  Hematological: Negative  Does not bruise/bleed easily  Psychiatric/Behavioral: Negative  Negative for confusion, hallucinations and sleep disturbance  All other systems reviewed and are negative  I personally reviewed the ROS that was entered by the medical assistant    Voice recognition software was used in the generation of this note  There may be unintentional errors including grammatical errors, spelling errors, or pronoun errors

## 2023-02-24 NOTE — PATIENT INSTRUCTIONS
-- Please increase your Divalproex to be 1000 mg twice a day  -- Please get a valproate level checked in about 2 weeks  -- I will also order a routine EEG to check before your next appointment  If you continue to have events, we may next arrange for you to be admitted to the Epilepsy Monitoring Unit

## 2023-03-06 NOTE — ASSESSMENT & PLAN NOTE
He has continued to have events concerning for seizures  His events do have features that would be concerning for focal seizures, but the description he provided today would also raise the possibility of nonepileptic events  His events did become more frequent when under more stress recently  I discussed that it will be important to continue to work with medications since Divalproex can help improve mood and can treat seizures, but I would also like to get some additional testing to confirm the etiology of his events  -- I will have him increase his Divalproex to be 1000 mg twice a day  I would like him to get a repeat set of blood work in about 2 weeks to reassess his medication level  -- I will also order a routine EEG to better characterize his events  If he continues to have seizures despite the above increase in medications and EEG is unremarkable, then he may benefit from admission to the EMU to better characterize his events

## 2023-04-25 ENCOUNTER — HOSPITAL ENCOUNTER (OUTPATIENT)
Dept: RADIOLOGY | Facility: HOSPITAL | Age: 38
Discharge: HOME | End: 2023-04-25

## 2023-04-25 DIAGNOSIS — S22.080A T12 COMPRESSION FRACTURE, INITIAL ENCOUNTER (HCC): ICD-10-CM

## 2023-04-25 DIAGNOSIS — M54.9 ACUTE BACK PAIN: ICD-10-CM

## 2023-04-26 ENCOUNTER — TELEPHONE (OUTPATIENT)
Dept: NEUROLOGY | Facility: CLINIC | Age: 38
End: 2023-04-26

## 2023-04-26 NOTE — TELEPHONE ENCOUNTER
----- Message from [facility] sent at 4/26/2023  8:36 AM EDT -----  Regarding: Meds  Contact: [phone]  I am wondering if my message was received     Pj Schaffer  to [facility] Crow Schneider (supporting Keisha Gutierrez MD)      12:40 PM  Dr Kay Mendieta, I am reaching out to inquire about my treatment plan and if I have any options that aren't so medication dependent? Taking Depakote has  diminished memory functions, added confusion/fog/anxiety, I haven't felt mentally ok since taking it  As for my seizures it affects me so I fall unconscious and that has been proven unsafe for my well being, I can't depend on my wife being there to catch me so I don't hit my head again (or worse) falling unconscious every time I have a seizure  Is there anything else than going to the emu or upping meds that we can do? I'm not denying any treatment but are there any other ways to aid in treating this that may be better for my overall health?

## 2023-04-26 NOTE — TELEPHONE ENCOUNTER
"Please call patient back regarding his questions to try to help clarify  Unfortunately, the main treatments are medications to start  There are other potential treatment options (surgery and medical devices), but he would need to have his events captured to better determine if they are seizures  At this point, it would likely be best to continue to work with medications, but the goal is \"no seizures and no side effects\"  If he is having trouble with side effects from the medication, then we may need to try a different medication  Ultimately, it may be best to discuss options in person  I do have an appointment coming up where we can discuss options  Getting his scheduled EEG before that appointment will be helpful as well     "

## 2023-04-27 ENCOUNTER — OFFICE VISIT (OUTPATIENT)
Dept: NEUROSURGERY | Facility: CLINIC | Age: 38
End: 2023-04-27

## 2023-04-27 VITALS — WEIGHT: 164.5 LBS | RESPIRATION RATE: 20 BRPM | HEIGHT: 67 IN | BODY MASS INDEX: 25.82 KG/M2

## 2023-04-27 DIAGNOSIS — S22.080A T12 COMPRESSION FRACTURE, INITIAL ENCOUNTER (HCC): ICD-10-CM

## 2023-04-27 NOTE — PROGRESS NOTES
A Neurosurgery Office Note  Mervat De Jesus 44 y o  male MRN: [id number]      Assessment/Plan     T12 compression fracture, initial encounter [facility])  · Presents for 2-week follow-up/consultation for T12 compression fracture status post recent seizure activity with convulsions on 4/6/23  · Pt also with chronic T4-T8 and T10 mild compression fxs s/p prior seizure activity  · Imaging reviewed personally  Final results below discussed with the patient  · CT thoracic and lumbar spine 4/8/2023: New compression deformity of the T12 vertebral body with approximately 28% ventral body height loss  Posterior-superior endplate fragment retropulsion by 5 mm with mild thecal sac impingement  The thoracic spine compression deformities T4-T8 and T10 generally appear chronic  Plan    · Pain control with over-the-counter and prescribed medications as needed  · Recommend TLSO brace when OOB and upright > 45 degrees  Discussed with patient the importance of using TLSO brace  · Cautioned to avoid bending, twisting, and turning the back  No lifting more than 10 lbs  No driving while being treated in the brace  · Given multiple compression fxs, discussed with pt discussing with PCP about bone health including consideration for DEXA scan, testing for Vitamin D/ vitamin D/calcium supplementation as needed  · At this time, no neurosurgical intervention is anticipated  Recommend that pt continue conservative management  · Pt to follow up with neurosurgery in 4 weeks with repeat Upright Xray of Thoracic spine  · Patient to return sooner with any worsening back pain, numbness, tingling, weakness, or bowel or bladder issues    · Patient verbalized understanding and was in agreement with the plan         Diagnoses and all orders for this visit:    T12 compression fracture, initial encounter [facility])  -     Ambulatory Referral to Neurosurgery  -     XR spine thoracolumbar 2 vw; Future          I have spent a total time of 40 minutes on 04/27/23 in caring for this patient including Diagnostic results, Risks and benefits of tx options, Instructions for management, Patient and family education, Importance of tx compliance, Risk factor reductions, Impressions, Counseling / Coordination of care, Documenting in the medical record, Reviewing / ordering tests, medicine, procedures  , Obtaining or reviewing history   and Communicating with other healthcare professionals   CHIEF COMPLAINT    Chief Complaint   Patient presents with   • Follow-up       HISTORY    History of Present Illness     44y o  year old male     Patient is a 65-year-old male with past medical history of epilepsy, seizures, T4-T8, T10 compression fractures presents to the neurosurgery office for consultation/ 2 week follow up for acute T12 compression fracture status post recent seizure on 4/6/2023  Patient reports that he had a convulsive seizure on 4/6/2023 and had severe back pain after the seizure  2 days later, patient was admitted for severe back pain and on 4/8/2023 patient was found to have acute T12 compression fracture  Patient was provided with a TLSO brace and conservative management was recommended  Today pt reports back pain that he rates as 7/10 on the pain scale  At this time, patient denies any radicular pain in bilateral lower extremities  Patient denies any new numbness, tingling, or weakness in bilateral arms or legs  Patient denies difficulty walking  Denies bowel or bladder incontinence  Denies saddle anesthesia  Patient reports that he tried to use a TLSO brace, he had severe pain and cannot tolerate using the TLSO brace  Reports that he only wore it for 1 day  Reports that for his pain he takes medical marijuana  Pt was prescribed Oxycodone and Flexeril but he did not take these medications as he did not want to take narcotics or other medications unless absolutely needed  Pt accompanied by wife to this visit         REVIEW OF SYSTEMS    Review of Systems   Constitutional: Negative  HENT: Negative  Eyes: Negative  Respiratory: Negative  Cardiovascular: Negative  Gastrointestinal: Positive for nausea (when pain gets bad)  Feels knots in stomach    Endocrine: Negative  Genitourinary: Negative  Musculoskeletal: Positive for back pain (lower back pain wraps around to both hip and lower abdomen area more on left side not radiating down legs  when sneezing hurts so bad he wants to collapse) and myalgias (rare spasms in ribs )  Negative for gait problem  Allergic/Immunologic: Negative  Neurological: Negative  Negative for weakness and numbness  Hematological: Does not bruise/bleed easily  Psychiatric/Behavioral: Positive for sleep disturbance (interrupted sleep due to pain)  ROS obtained by MA  Reviewed  See HPI  Meds/Allergies     Current Outpatient Medications   Medication Sig Dispense Refill   • NON FORMULARY Medical maureen     • oxyCODONE (ROXICODONE) 5 immediate release tablet Take 1 tablet (5 mg total) by mouth every 6 (six) hours as needed for moderate pain for up to 15 doses Max Daily Amount: 20 mg 15 tablet 0     No current facility-administered medications for this visit         Allergies   Allergen Reactions   • Norco [Hydrocodone-Acetaminophen] Seizures   • Sumatriptan Rash     Burning sensation   skin gets warm  skin gets warm         PAST HISTORY    Past Medical History:   Diagnosis Date   • Anxiety    • Brain bleed ([facility] [address] ) 2012   • Compression fracture of body of thoracic vertebra (HCC)    • Epilepsy (HCC)    • GERD (gastroesophageal reflux disease)    • Migraine    • Seizures (HCC)        Past Surgical History:   Procedure Laterality Date   • EPIDURAL BLOCK INJECTION N/A 2/11/2021    Procedure: T9 T10 INTERLAMINAR THORACIC EPIDURAL STEROID INJECTION;  Surgeon: Arnoldo Guan MD;  Location: [facility];  Service: Pain Management    • FINGER SURGERY Right     5th digit   • NERVE BLOCK "Bilateral 1/21/2021    Procedure: T7 T8 T9 T10 MEDIAL BRANCH BLOCK #1;  Surgeon: Trell Busch MD;  Location: [facility];  Service: Pain Management    • NH OPEN TX PHALANGEAL SHAFT FRACTURE PROX/MIDDLE EA Left 9/15/2022    Procedure: Closed reducation w/percutaneous pinning left ring and small finger metacarpal fracture dislocations;  Surgeon: Kiara Kebede MD;  Location: [facility];  Service: Orthopedics   • WISDOM TOOTH EXTRACTION         Social History     Tobacco Use   • Smoking status: Former     Types: Cigarettes   • Smokeless tobacco: Never   Vaping Use   • Vaping Use: Never used   Substance Use Topics   • Alcohol use: Not Currently   • Drug use: Yes     Types: Marijuana     Comment: medical marijuana       Family History   Problem Relation Age of Onset   • Thyroid disease Mother    • Heart disease Father    • Hypertension Father    • No Known Problems Brother    • Cancer Maternal Grandmother    • No Known Problems Maternal Grandfather    • Parkinsonism Paternal Grandmother    • Heart disease Paternal Grandfather    • Other Son         blounts disease   • No Known Problems Daughter    • Seizures Neg Hx          Above history personally reviewed  EXAM    Vitals:Blood pressure (P) 104/62, pulse (P) 76, temperature (P) 98 6 °F (37 °C), resp  rate 20, height 5' 7\" (1 702 m), weight 74 6 kg (164 lb 8 oz)  ,Body mass index is 25 76 kg/m²  Physical Exam  Constitutional:       Appearance: He is well-developed  HENT:      Head: Normocephalic and atraumatic  Eyes:      General: No scleral icterus  Conjunctiva/sclera: Conjunctivae normal       Pupils: Pupils are equal, round, and reactive to light  Neck:      Trachea: No tracheal deviation  Cardiovascular:      Rate and Rhythm: Normal rate  Pulmonary:      Effort: Pulmonary effort is normal    Abdominal:      Palpations: Abdomen is soft  Tenderness: There is no abdominal tenderness  There is no guarding     Musculoskeletal:      Cervical " back: Neck supple  Comments: TLSO Brace not in place  Skin:     General: Skin is warm and dry  Coloration: Skin is not pale  Findings: No rash  Neurological:      Mental Status: He is alert and oriented to person, place, and time  Comments: GCS 15, Awake, Alert, Oriented x 3    Motor: DAVIDSON, strength 5/5 throughout    Sensation:  intact to LTX 4     Reflexes: 2+ and symmetric, no griffiths's or clonus     Coordination: no drift bilateral upper extremities, finger to nose normal bilaterally  Psychiatric:         Behavior: Behavior normal          Neurologic Exam     Mental Status   Oriented to person, place, and time  Cranial Nerves     CN III, IV, VI   Pupils are equal, round, and reactive to light  MEDICAL DECISION MAKING    Imaging Studies:     XR thoracic spine 2 views    Result Date: 4/8/2023  Narrative: THORACIC SPINE INDICATION:   pain  COMPARISON:  Thoracic spine plain films from 1/15/2021  T-spine MRI from 2/4/2021  VIEWS:  XR SPINE THORACIC 2 VW FINDINGS: Mild superior endplate compression fracture of T12, new compared to prior studies  Mild loss of height of several other mid thoracic vertebral bodies are chronic and unchanged  Thoracic vertebral alignment is within normal limits  Mild degenerative changes seen throughout the thoracic spine  There is no displacement of the paraspinal line  The pedicles appear intact  Impression: Mild superior endplate compression fracture of T12, new compared to prior studies  Mild loss of height of several other mid thoracic vertebral bodies are chronic and unchanged  Workstation performed: [id number]     XR spine thoracolumbar 2 vw    Result Date: 4/26/2023  Narrative: THORACOLUMBAR SPINE INDICATION:   M54 9: Dorsalgia, unspecified S22 080A: Wedge compression fracture of T11-T12 vertebra, initial encounter for closed fracture   COMPARISON: 4/8/2023 VIEWS:  XR SPINE THORACOLUMBAR 2 VW FINDINGS: Unchanged moderate compression deformity of T12  Mild loss of stature in T10 again noted  Thoracic vertebral alignment is within normal limits  No subluxation  No significant degenerative changes  There is no displacement of the paraspinal line  The pedicles appear intact  Impression: Unchanged T12 compression fracture  Workstation performed: [id number]     XR lumbar spine 2 or 3 views    Result Date: 4/8/2023  Narrative: LUMBAR SPINE INDICATION:   pain  COMPARISON:  1/15/2021 VIEWS:  XR SPINE LUMBAR 2 OR 3 VIEWS INJURY FINDINGS: There are 5 non rib bearing lumbar vertebral bodies  There is no evidence of acute fracture or destructive osseous lesion  Alignment is unremarkable  No significant lumbar degenerative change noted  The pedicles appear intact  Soft tissues are unremarkable  Impression: Normal examination  Workstation performed: [id number]     US abdomen complete    Result Date: 4/14/2023  Narrative: ABDOMEN ULTRASOUND, COMPLETE INDICATION:   R10 84: Generalized abdominal pain R11 2: Nausea with vomiting, unspecified  COMPARISON:  None TECHNIQUE:   Real-time ultrasound of the abdomen was performed with a curvilinear transducer with both volumetric sweeps and still imaging techniques  FINDINGS: PANCREAS:  Visualized portions of the pancreas are within normal limits  AORTA AND IVC:  Visualized portions are normal for patient age  LIVER: Size:  Within normal range  The liver measures 16 0 cm in the midclavicular line  Contour:  Surface contour is smooth  Parenchyma:  Echogenicity and echotexture are within normal limits  No liver mass identified  Limited imaging of the main portal vein shows it to be patent and hepatopetal  BILIARY: There are multiple gallbladder polyps measuring up to 5 mm  It has a pedunculated ball-on-the-wall appearance  According to current consensus recommendations (SRU 2022; 000:1-12), for polyps of this size ( <=  9 mm) which have an extremely low risk morphology, no follow-up is recommended   Reference: Management of Incidentally Detected Gallbladder Polyps: Society of Radiologists in Ultrasound Consensus Conference Recommendations  Radiology 2022; 000:1-12  https://pubs  rsna org/doi/full/10 1148/radiol  [id number] Gallbladder is partially contracted  No intrahepatic biliary dilatation  CBD measures 4 0 mm  No choledocholithiasis  KIDNEY: Right kidney measures 11 0 x 5 6 x 5 4  cm  Volume 175 1 mL Subcentimeter simple cyst  Left kidney measures 11 0 x 5 5 x 4 7 cm  Volume 148 6 mL Kidney within normal limits  SPLEEN: Measures 9 1 x 9 3 x 3 5 cm  Volume 154 5 mL Within normal limits  ASCITES:  None  Impression: Multiple gallbladder polyps measuring up to 5 mm  According to current consensus recommendations (SRU 2022; 000:1-12), for polyps of this size ( <=  9 mm) which have an extremely low risk morphology, no follow-up is recommended  Reference: Management of Incidentally Detected Gallbladder Polyps: Society of Radiologists in Ultrasound Consensus Conference Recommendations  Radiology 2022; 000:1-12  https://pubs  rsna org/doi/full/10 1148/radiol  [id number] Otherwise, within normal limits  Workstation performed: [id number]     CT spine thoracic & lumbar wo contrast    Result Date: 4/8/2023  Narrative: CT THORACIC AND LUMBAR SPINE INDICATION:   Compression fracture, lumbar t12 fracture when compared to old films including MRI (2021)  COMPARISON:  Radiographs dated 1/15/2021  Thoracic spine MRI dated 2/4/2021 TECHNIQUE:  Contiguous axial images were obtained  Sagittal and coronal reconstructions were performed  Radiation dose length product (DLP) for this visit:  1288 mGy-cm   This examination, like all CT scans performed in the [facility], was performed utilizing techniques to minimize radiation dose exposure, including the use of iterative reconstruction and automated exposure control  IMAGE QUALITY:  Diagnostic  FINDINGS: ALIGNMENT: Normal thoracic kyphosis and lumbar lordosis   No listhesis or scoliosis  VERTEBRAE:  Mild anterior compression deformities of T4-T8 appear relatively unchanged except for progression of Schmorl's node formation at T8  Anterior wedging of T10 appears slightly greater than in 2021, but there are no acute fracture planes demonstrated  New compression deformity of the T12 vertebral body with approximately 28% body height loss anteriorly  Retropulsion of posterior-superior endplate fragment by 5 mm with associated mild thecal sac impingement  DEGENERATIVE CHANGES:  Small disc bulges at and below L3-4  No high-grade degenerative changes or areas of degenerative neural impingement  PARASPINAL SOFT TISSUES:  No paraspinal or prevertebral collections demonstrated  Though sensitivity of noncontrast exam is limited, there is no obvious epidural or paraspinal hematoma at the fracture level  Impression: 1  New compression deformity of the T12 vertebral body with approximately 28% ventral body height loss  Posterior-superior endplate fragment retropulsion by 5 mm with mild thecal sac impingement  Recommend orthopedic spine or neurosurgery consultation  2   The other thoracic spine compression deformities generally appear chronic and suggestive of Scheuermann's osteochondrosis, though T10 height loss appears greater than in 2021  Correlate for focal midline tenderness higher in the thoracic spine to exclude acute component  The study was marked in [facility] for immediate notification  Workstation performed: [id number]       I have personally reviewed pertinent reports     and I have personally reviewed pertinent films in PACS

## 2023-04-27 NOTE — TELEPHONE ENCOUNTER
This is rachelle meza calling back I was in my appointment at [place] [address] neurology, the neuro surgeon, the neuro surgical associates  I was in the appointment when you gubritt called and I wasn't able to answer  If you guys could give me a call back again, it's [phone]  Thanks   -----------------------------------  Called pt and made him aware of below  EEG is scheduled for 5/12 and f/u appt is scheduled pm 5/17    [facility]

## 2023-04-27 NOTE — ASSESSMENT & PLAN NOTE
· Presents for 2-week follow-up/consultation for T12 compression fracture status post recent seizure activity with convulsions on 4/6/23  · Pt also with chronic T4-T8 and T10 mild compression fxs s/p prior seizure activity  · Imaging reviewed personally  Final results below discussed with the patient  · CT thoracic and lumbar spine 4/8/2023: New compression deformity of the T12 vertebral body with approximately 28% ventral body height loss  Posterior-superior endplate fragment retropulsion by 5 mm with mild thecal sac impingement  The thoracic spine compression deformities T4-T8 and T10 generally appear chronic  Plan    · Pain control with over-the-counter and prescribed medications as needed  · Recommend TLSO brace when OOB and upright > 45 degrees  Discussed with patient the importance of using TLSO brace  · Cautioned to avoid bending, twisting, and turning the back  No lifting more than 10 lbs  No driving while being treated in the brace  · Given multiple compression fxs, discussed with pt discussing with PCP about bone health including consideration for DEXA scan, testing for Vitamin D/ vitamin D/calcium supplementation as needed  · At this time, no neurosurgical intervention is anticipated  Recommend that pt continue conservative management  · Pt to follow up with neurosurgery in 4 weeks with repeat Upright Xray of Thoracic spine  · Patient to return sooner with any worsening back pain, numbness, tingling, weakness, or bowel or bladder issues    · Patient verbalized understanding and was in agreement with the plan

## 2023-05-12 ENCOUNTER — TELEPHONE (OUTPATIENT)
Dept: NEUROLOGY | Facility: CLINIC | Age: 38
End: 2023-05-12

## 2023-05-12 NOTE — TELEPHONE ENCOUNTER
Maxime Schaffer 13 minutes ago (12:01 PM)     [place]  Hello  This message line is to add/ remove providers from your Care Team   Your request has been forwarded to your provider's office to follow-up  Thank you  This [address] message has not been read  Avery Lpoez  Neurology OCTAVIANO Siddiqui 13 minutes ago (12:00 PM)     [place]  Please see below          [place]ia Paula  Patient Customer Service Request Pool 6 hours ago (5:43 AM)     Topic: Change My Care Team Providers      I've been trying to call someone and tell them I lost my ride to my appointment at 730am for an awake EEG I can't get ahold of anyone I jiat want yo tell them before it's time I don't have a car so im  dependrnt on others and today they can't drive me I would like someone to call me and let me know you got this   [phone]

## 2023-09-18 ENCOUNTER — APPOINTMENT (EMERGENCY)
Dept: CT IMAGING | Facility: HOSPITAL | Age: 38
End: 2023-09-18
Payer: MEDICARE

## 2023-09-18 ENCOUNTER — HOSPITAL ENCOUNTER (EMERGENCY)
Facility: HOSPITAL | Age: 38
Discharge: HOME | End: 2023-09-18
Attending: EMERGENCY MEDICINE
Payer: MEDICARE

## 2023-09-18 ENCOUNTER — APPOINTMENT (EMERGENCY)
Dept: RADIOLOGY | Facility: HOSPITAL | Age: 38
End: 2023-09-18
Payer: MEDICARE

## 2023-09-18 VITALS
HEART RATE: 57 BPM | DIASTOLIC BLOOD PRESSURE: 78 MMHG | OXYGEN SATURATION: 98 % | RESPIRATION RATE: 18 BRPM | SYSTOLIC BLOOD PRESSURE: 119 MMHG | TEMPERATURE: 98 F | WEIGHT: 165.2 LBS | BODY MASS INDEX: 25.87 KG/M2

## 2023-09-18 DIAGNOSIS — G40.909 SEIZURE DISORDER (HCC): Primary | ICD-10-CM

## 2023-09-18 LAB
ALBUMIN SERPL BCP-MCNC: 4.5 G/DL (ref 3.5–5)
ALP SERPL-CCNC: 83 U/L (ref 34–104)
ALT SERPL W P-5'-P-CCNC: 22 U/L (ref 7–52)
AMORPH URATE CRY URNS QL MICRO: ABNORMAL /HPF
ANION GAP SERPL CALCULATED.3IONS-SCNC: 7 MMOL/L
AST SERPL W P-5'-P-CCNC: 34 U/L (ref 13–39)
BACTERIA UR QL AUTO: ABNORMAL /HPF
BASOPHILS # BLD AUTO: 0.07 THOUSANDS/ÂΜL (ref 0–0.1)
BASOPHILS NFR BLD AUTO: 1 % (ref 0–1)
BILIRUB SERPL-MCNC: 0.59 MG/DL (ref 0.2–1)
BILIRUB UR QL STRIP: NEGATIVE
BUN SERPL-MCNC: 15 MG/DL (ref 5–25)
CALCIUM SERPL-MCNC: 9.3 MG/DL (ref 8.4–10.2)
CHLORIDE SERPL-SCNC: 104 MMOL/L (ref 96–108)
CLARITY UR: CLEAR
CO2 SERPL-SCNC: 27 MMOL/L (ref 21–32)
COLOR UR: YELLOW
CREAT SERPL-MCNC: 1.13 MG/DL (ref 0.6–1.3)
EOSINOPHIL # BLD AUTO: 0.09 THOUSAND/ÂΜL (ref 0–0.61)
EOSINOPHIL NFR BLD AUTO: 1 % (ref 0–6)
ERYTHROCYTE [DISTWIDTH] IN BLOOD BY AUTOMATED COUNT: 12.2 % (ref 11.6–15.1)
GFR SERPL CREATININE-BSD FRML MDRD: 81 ML/MIN/1.73SQ M
GLUCOSE SERPL-MCNC: 90 MG/DL (ref 65–140)
GLUCOSE UR STRIP-MCNC: NEGATIVE MG/DL
HCT VFR BLD AUTO: 44.9 % (ref 36.5–49.3)
HGB BLD-MCNC: 15.2 G/DL (ref 12–17)
HGB UR QL STRIP.AUTO: ABNORMAL
IMM GRANULOCYTES # BLD AUTO: 0.04 THOUSAND/UL (ref 0–0.2)
IMM GRANULOCYTES NFR BLD AUTO: 0 % (ref 0–2)
KETONES UR STRIP-MCNC: NEGATIVE MG/DL
LEUKOCYTE ESTERASE UR QL STRIP: NEGATIVE
LYMPHOCYTES # BLD AUTO: 1.15 THOUSANDS/ÂΜL (ref 0.6–4.47)
LYMPHOCYTES NFR BLD AUTO: 10 % (ref 14–44)
MCH RBC QN AUTO: 30.2 PG (ref 26.8–34.3)
MCHC RBC AUTO-ENTMCNC: 33.9 G/DL (ref 31.4–37.4)
MCV RBC AUTO: 89 FL (ref 82–98)
MONOCYTES # BLD AUTO: 0.79 THOUSAND/ÂΜL (ref 0.17–1.22)
MONOCYTES NFR BLD AUTO: 7 % (ref 4–12)
MUCOUS THREADS UR QL AUTO: ABNORMAL
NEUTROPHILS # BLD AUTO: 8.91 THOUSANDS/ÂΜL (ref 1.85–7.62)
NEUTS SEG NFR BLD AUTO: 81 % (ref 43–75)
NITRITE UR QL STRIP: NEGATIVE
NON-SQ EPI CELLS URNS QL MICRO: ABNORMAL /HPF
NRBC BLD AUTO-RTO: 0 /100 WBCS
PH UR STRIP.AUTO: 5.5 [PH]
PLATELET # BLD AUTO: 226 THOUSANDS/UL (ref 149–390)
PMV BLD AUTO: 9.6 FL (ref 8.9–12.7)
POTASSIUM SERPL-SCNC: 3.9 MMOL/L (ref 3.5–5.3)
PROT SERPL-MCNC: 7.1 G/DL (ref 6.4–8.4)
PROT UR STRIP-MCNC: ABNORMAL MG/DL
RBC # BLD AUTO: 5.03 MILLION/UL (ref 3.88–5.62)
RBC #/AREA URNS AUTO: ABNORMAL /HPF
SODIUM SERPL-SCNC: 138 MMOL/L (ref 135–147)
SP GR UR STRIP.AUTO: >=1.03 (ref 1–1.03)
UROBILINOGEN UR QL STRIP.AUTO: 0.2 E.U./DL
WBC # BLD AUTO: 11.05 THOUSAND/UL (ref 4.31–10.16)
WBC #/AREA URNS AUTO: ABNORMAL /HPF

## 2023-09-18 PROCEDURE — 73030 X-RAY EXAM OF SHOULDER: CPT

## 2023-09-18 PROCEDURE — 85025 COMPLETE CBC W/AUTO DIFF WBC: CPT | Performed by: EMERGENCY MEDICINE

## 2023-09-18 PROCEDURE — 99285 EMERGENCY DEPT VISIT HI MDM: CPT | Performed by: EMERGENCY MEDICINE

## 2023-09-18 PROCEDURE — 99285 EMERGENCY DEPT VISIT HI MDM: CPT

## 2023-09-18 PROCEDURE — 70450 CT HEAD/BRAIN W/O DYE: CPT

## 2023-09-18 PROCEDURE — G1004 CDSM NDSC: HCPCS

## 2023-09-18 PROCEDURE — 81001 URINALYSIS AUTO W/SCOPE: CPT | Performed by: EMERGENCY MEDICINE

## 2023-09-18 PROCEDURE — 36415 COLL VENOUS BLD VENIPUNCTURE: CPT | Performed by: EMERGENCY MEDICINE

## 2023-09-18 PROCEDURE — 71101 X-RAY EXAM UNILAT RIBS/CHEST: CPT

## 2023-09-18 PROCEDURE — 80053 COMPREHEN METABOLIC PANEL: CPT | Performed by: EMERGENCY MEDICINE

## 2023-09-18 NOTE — ED PROVIDER NOTES
History  Chief Complaint   Patient presents with   • Seizure - Prior Hx Of     2 seizures- 1 yesterday and 1 today. Has not been taking his seizure medication because he doesn't like the medication. C/o bilateral arm pain and neck pain. Patient with history of seizure disorder, stopped taking his seizure medications in May, had a seizure last night and a seizure today. Presents for evaluation of the above in order to obtain "scans". Patient is concerned he may have dislocated the shoulder or broken some ribs during his most recent seizure. States he has an appointment in November with his neurologist.      History provided by:  Patient   used: No    Seizure - Prior Hx Of  Seizure activity on arrival: no    Seizure type:  Grand mal  Initial focality:  None  Return to baseline: yes    Severity:  Mild  Timing:  Once  Progression:  Unchanged  Context: medical non-compliance    Recent head injury:  No recent head injuries  PTA treatment:  None  History of seizures: yes    Similar to previous episodes: yes    Severity:  Mild  Seizure control level:  Poorly controlled  Current therapy:  None  Compliance with current therapy:  Poor      Prior to Admission Medications   Prescriptions Last Dose Informant Patient Reported? Taking?    NON FORMULARY   Yes No   Sig: Medical maureen   oxyCODONE (ROXICODONE) 5 immediate release tablet   No No   Sig: Take 1 tablet (5 mg total) by mouth every 6 (six) hours as needed for moderate pain for up to 15 doses Max Daily Amount: 20 mg      Facility-Administered Medications: None       Past Medical History:   Diagnosis Date   • Anxiety    • Brain bleed ([address]) 2012   • Compression fracture of body of thoracic vertebra (HCC)    • Epilepsy (HCC)    • GERD (gastroesophageal reflux disease)    • Migraine    • Seizures ([address])        Past Surgical History:   Procedure Laterality Date   • EPIDURAL BLOCK INJECTION N/A 2/11/2021    Procedure: T9 T10 INTERLAMINAR THORACIC EPIDURAL STEROID INJECTION;  Surgeon: Rosie Horn MD;  [facility];  Service: Pain Management    • FINGER SURGERY Right     5th digit   • NERVE BLOCK Bilateral 1/21/2021    Procedure: T7 T8 T9 T10 MEDIAL BRANCH BLOCK #1;  Surgeon: Rosie Horn MD;  Location: [facility];  Service: Pain Management    • FL OPEN TX PHALANGEAL SHAFT FRACTURE PROX/MIDDLE EA Left 9/15/2022    Procedure: Closed reducation w/percutaneous pinning left ring and small finger metacarpal fracture dislocations;  Surgeon: Dottie Marie MD;  Location:  [facility];  Service: Orthopedics   • WISDOM TOOTH EXTRACTION         Family History   Problem Relation Age of Onset   • Thyroid disease Mother    • Heart disease Father    • Hypertension Father    • No Known Problems Brother    • Cancer Maternal Grandmother    • No Known Problems Maternal Grandfather    • Parkinsonism Paternal Grandmother    • Heart disease Paternal Grandfather    • Other Son         blounts disease   • No Known Problems Daughter    • Seizures Neg Hx      I have reviewed and agree with the history as documented. E-Cigarette/Vaping   • E-Cigarette Use Never User      E-Cigarette/Vaping Substances     Social History     Tobacco Use   • Smoking status: Former     Types: Cigarettes   • Smokeless tobacco: Never   Vaping Use   • Vaping Use: Never used   Substance Use Topics   • Alcohol use: Not Currently   • Drug use: Yes     Types: Marijuana     Comment: medical marijuana       Review of Systems   Constitutional: Negative for chills and fever. HENT: Negative for ear pain, hearing loss, sore throat, trouble swallowing and voice change. Eyes: Negative for pain and discharge. Respiratory: Negative for cough, shortness of breath and wheezing. Cardiovascular: Negative for chest pain and palpitations. Gastrointestinal: Negative for abdominal pain, blood in stool, constipation, diarrhea, nausea and vomiting.    Genitourinary: Negative for dysuria, flank pain, frequency and hematuria. Musculoskeletal: Negative for joint swelling, neck pain and neck stiffness. Skin: Negative for rash and wound. Neurological: Negative for dizziness, seizures, syncope, facial asymmetry and headaches. Psychiatric/Behavioral: Negative for hallucinations, self-injury and suicidal ideas. All other systems reviewed and are negative. Physical Exam  Physical Exam  Vitals and nursing note reviewed. Constitutional:       General: He is not in acute distress. Appearance: He is well-developed. HENT:      Head: Normocephalic and atraumatic. Right Ear: External ear normal.      Left Ear: External ear normal.   Eyes:      General: No scleral icterus. Right eye: No discharge. Left eye: No discharge. Extraocular Movements: Extraocular movements intact. Conjunctiva/sclera: Conjunctivae normal.   Cardiovascular:      Rate and Rhythm: Normal rate and regular rhythm. Heart sounds: Normal heart sounds. No murmur heard. Pulmonary:      Effort: Pulmonary effort is normal.      Breath sounds: Normal breath sounds. No wheezing or rales. Abdominal:      General: Bowel sounds are normal. There is no distension. Palpations: Abdomen is soft. Tenderness: There is no abdominal tenderness. There is no guarding or rebound. Musculoskeletal:         General: No deformity. Normal range of motion. Cervical back: Normal range of motion and neck supple. Skin:     General: Skin is warm and dry. Findings: No rash. Neurological:      General: No focal deficit present. Mental Status: He is alert and oriented to person, place, and time. Cranial Nerves: No cranial nerve deficit. Sensory: No sensory deficit. Motor: No weakness. Coordination: Coordination normal.      Gait: Gait normal.   Psychiatric:         Mood and Affect: Mood normal.         Behavior: Behavior normal.         Thought Content:  Thought content normal.         Judgment: Judgment normal.         Vital Signs  ED Triage Vitals [09/18/23 1112]   Temperature Pulse Respirations Blood Pressure SpO2   98 °F (36.7 °C) 73 18 136/99 99 %      Temp Source Heart Rate Source Patient Position - Orthostatic VS BP Location FiO2 (%)   Temporal Monitor Sitting Left arm --      Pain Score       8           Vitals:    09/18/23 1200 09/18/23 1230 09/18/23 1245 09/18/23 1300   BP: 129/96 129/96     Pulse: 58 56 64 63   Patient Position - Orthostatic VS:  Lying           Visual Acuity  Visual Acuity    Flowsheet Row Most Recent Value   L Pupil Size (mm) 2   R Pupil Size (mm) 2          ED Medications  Medications - No data to display    Diagnostic Studies  Results Reviewed     Procedure Component Value Units Date/Time    Urine Microscopic [[id number]]  (Abnormal) Collected: 09/18/23 1156    Lab Status: Final result Specimen: Urine, Clean Catch Updated: 09/18/23 1244     RBC, UA 1-2 /hpf      WBC, UA 0-1 /hpf      Epithelial Cells Occasional /hpf      Bacteria, UA Occasional /hpf      AMORPH URATES Occasional /hpf      MUCUS THREADS Occasional    Comprehensive metabolic panel [[id number]] Collected: 09/18/23 1155    Lab Status: Final result Specimen: Blood from Arm, Right Updated: 09/18/23 1218     Sodium 138 mmol/L      Potassium 3.9 mmol/L      Chloride 104 mmol/L      CO2 27 mmol/L      ANION GAP 7 mmol/L      BUN 15 mg/dL      Creatinine 1.13 mg/dL      Glucose 90 mg/dL      Calcium 9.3 mg/dL      AST 34 U/L      ALT 22 U/L      Alkaline Phosphatase 83 U/L      Total Protein 7.1 g/dL      Albumin 4.5 g/dL      Total Bilirubin 0.59 mg/dL      eGFR 81 ml/min/1.73sq m     Narrative:      [place] guidelines for Chronic Kidney Disease (CKD):   •  Stage 1 with normal or high GFR (GFR > 90 mL/min/1.73 square meters)  •  Stage 2 Mild CKD (GFR = 60-89 mL/min/1.73 square meters)  •  Stage 3A Moderate CKD (GFR = 45-59 mL/min/1.73 square meters)  •  Stage 3B Moderate CKD (GFR = 30-44 mL/min/1.73 square meters)  •  Stage 4 Severe CKD (GFR = 15-29 mL/min/1.73 square meters)  •  Stage 5 End Stage CKD (GFR <15 mL/min/1.73 square meters)  Note: GFR calculation is accurate only with a steady state creatinine    UA w Reflex to Microscopic w Reflex to Culture [[id number]]  (Abnormal) Collected: 09/18/23 1156    Lab Status: Final result Specimen: Urine, Clean Catch Updated: 09/18/23 1207     Color, UA Yellow     Clarity, UA Clear     Specific Gravity, UA >=1.030     pH, UA 5.5     Leukocytes, UA Negative     Nitrite, UA Negative     Protein, UA 30 (1+) mg/dl      Glucose, UA Negative mg/dl      Ketones, UA Negative mg/dl      Urobilinogen, UA 0.2 E.U./dl      Bilirubin, UA Negative     Occult Blood, UA Moderate    CBC and differential [[id number]]  (Abnormal) Collected: 09/18/23 1155    Lab Status: Final result Specimen: Blood from Arm, Right Updated: 09/18/23 1204     WBC 11.05 Thousand/uL      RBC 5.03 Million/uL      Hemoglobin 15.2 g/dL      Hematocrit 44.9 %      MCV 89 fL      MCH 30.2 pg      MCHC 33.9 g/dL      RDW 12.2 %      MPV 9.6 fL      Platelets 349 Thousands/uL      nRBC 0 /100 WBCs      Neutrophils Relative 81 %      Immat GRANS % 0 %      Lymphocytes Relative 10 %      Monocytes Relative 7 %      Eosinophils Relative 1 %      Basophils Relative 1 %      Neutrophils Absolute 8.91 Thousands/µL      Immature Grans Absolute 0.04 Thousand/uL      Lymphocytes Absolute 1.15 Thousands/µL      Monocytes Absolute 0.79 Thousand/µL      Eosinophils Absolute 0.09 Thousand/µL      Basophils Absolute 0.07 Thousands/µL                  CT head wo contrast   Final Result by Paradise Velazquez MD (09/18 1252)      No acute intracranial abnormality. Workstation performed: [id number]         XR shoulder 2+ views RIGHT   Final Result by Amber Vee MD (09/18 1255)      No acute osseous abnormality.       Workstation performed: [id number]         XR ribs with pa chest min 3 views RIGHT Final Result by Elias Vargas MD (09/18 1258)      No acute cardiopulmonary disease. No evidence of rib fractures. Findings are stable      Workstation performed: [id number]                    Procedures  Procedures         ED Course                               SBIRT 22yo+    Flowsheet Row Most Recent Value   Initial Alcohol Screen: US AUDIT-C     1. How often do you have a drink containing alcohol? 0 Filed at: 09/18/2023 1157   2. How many drinks containing alcohol do you have on a typical day you are drinking? 0 Filed at: 09/18/2023 1157   3a. Male UNDER 65: How often do you have five or more drinks on one occasion? 0 Filed at: 09/18/2023 1157   Audit-C Score 0 Filed at: 09/18/2023 1157   CORA: How many times in the past year have you. .. Used an illegal drug or used a prescription medication for non-medical reasons? Never Filed at: 09/18/2023 1157                    Medical Decision Making  Based on the history and medical screening exam performed the diagnostic considerations include but are not limited to breakthrough seizure, seizure disorder, intracranial hemorrhage, intracranial mass, rib fractures, shoulder dislocations, electrolyte abnormalities, urinary tract infection. Based on the work-up performed in the emergency room which includes physical examination, and which may include laboratory studies and imaging as warranted including advanced imaging such as CT scan or ultrasound, the differential diagnosis is narrowed to exclude limb or life-threatening process. The patient is stable for discharge. Patient has negative work-up in the emergency room including lab work, CT head, x-rays of the shoulder and ribs. He has already been on Keppra and Depakote and does not wish to take these medications. He is scheduled to follow-up with his neurologist in the near future. I have advised him to keep this appointment.   He is agreeable    Amount and/or Complexity of Data Reviewed  Labs: ordered. Decision-making details documented in ED Course. Details: Negative  Radiology: ordered and independent interpretation performed. Decision-making details documented in ED Course. Details: CT head negative, x-ray right shoulder and right ribs negative          Disposition  Final diagnoses:   Seizure disorder [facility])     Time reflects when diagnosis was documented in both MDM as applicable and the Disposition within this note     Time User Action Codes Description Comment    9/18/2023  1:20 PM Winifred Carmen Add [Z82.876] Seizure disorder [facility])       ED Disposition     ED Disposition   Discharge    Condition   Stable    Date/Time   Mon Sep 18, 2023  1:20 PM    [address] discharge to home/self care. Follow-up Information     Follow up With Specialties Details Why [address] MD [place] [phone]            Patient's Medications   Discharge Prescriptions    No medications on file       No discharge procedures on file.     PDMP Review       Value Time User    PDMP Reviewed  Yes 4/8/2023 11:50 AM Nunu Bundy DO          ED Provider  Electronically Signed by           Winifred Carmen MD  09/18/23 [id number]

## 2024-08-11 ENCOUNTER — HOSPITAL ENCOUNTER (EMERGENCY)
Facility: HOSPITAL | Age: 39
Discharge: HOME | End: 2024-08-11
Attending: EMERGENCY MEDICINE
Payer: MEDICARE

## 2024-08-11 VITALS
WEIGHT: 165 LBS | HEIGHT: 67 IN | RESPIRATION RATE: 16 BRPM | DIASTOLIC BLOOD PRESSURE: 80 MMHG | BODY MASS INDEX: 25.9 KG/M2 | HEART RATE: 77 BPM | SYSTOLIC BLOOD PRESSURE: 113 MMHG | TEMPERATURE: 97.7 F | OXYGEN SATURATION: 97 %

## 2024-08-11 DIAGNOSIS — R59.1 LYMPHADENOPATHY: Primary | ICD-10-CM

## 2024-08-11 PROCEDURE — 99282 EMERGENCY DEPT VISIT SF MDM: CPT

## 2024-08-11 PROCEDURE — 99283 EMERGENCY DEPT VISIT LOW MDM: CPT | Performed by: EMERGENCY MEDICINE

## 2024-08-11 NOTE — DISCHARGE INSTRUCTIONS
Your findings are consistent with lymphadenopathy.  There was no mass that required drainage.  We recommend that you continue with your antibiotics.  You may use warm compresses 4-6 times a day at 10 to 15 minutes at a time.  You may use ibuprofen or Naprosyn as needed for pain.    If you become worse you should return to the emergency room.  This would include worse swelling, drainage, high fevers, or worsening pain.  Persistent lymph nodes or lymph node development in other areas of you body would require follow-up with your primary care physician for possible referral for biopsy.    Thank you for choosing the emergency department at [facility]. We appreciated the opportunity and privilege to address your healthcare needs. We remain available to you should you require additional evaluation or assistance. We value your feedback and would appreciate the opportunity to address anything you identified as an opportunity to improve or where we excelled. If there are colleagues who deserve special recognition, please let us know! We hope you are feeling better soon!    Please also note that sometimes there are subtle abnormalities in your lab values that you may observe when you access your record online.  These are frequently not worrisome and if they are of concern we will have discussed them with you.  However, we always encourage that you discuss any concerns you may have or observe on your record with your primary care provider.   Please also note that while your visit documentation was reviewed prior to completion, voice transcription will occasionally recognize words or grammar differently than what was spoken.

## 2024-08-11 NOTE — ED PROVIDER NOTES
"History  Chief Complaint   Patient presents with    Mass     On bactrim for wound on left thumb after popping a pimple. Patient reports \" theres now a lump in my armpit and its very swollen and tender\"      Patient is pleasant 38-year-old male presenting to the emergency room for evaluation of a swelling in his left axilla.  Patient reports that he was recently treated for an infection on his left thumb.  Started on Bactrim DS.  Patient reports that the left thumb is improved but he recently started experiencing a discomfort and swelling in his left axilla.  The discomfort does radiate slightly into his chest.  He denies any fevers or chills.  No nausea or vomiting.  Reports that the discomfort that he feels in his left axilla is worse with movement and with palpation.      History provided by:  Patient and spouse      Prior to Admission Medications   Prescriptions Last Dose Informant Patient Reported? Taking?   NON FORMULARY   Yes No   Sig: Medical maureen   oxyCODONE (ROXICODONE) 5 immediate release tablet   No No   Sig: Take 1 tablet (5 mg total) by mouth every 6 (six) hours as needed for moderate pain for up to 15 doses Max Daily Amount: 20 mg      Facility-Administered Medications: None       Past Medical History:   Diagnosis Date    Anxiety     Brain bleed (HCC) 2012    Compression fracture of body of thoracic vertebra (HCC)     Epilepsy (HCC)     GERD (gastroesophageal reflux disease)     Migraine     Seizures (HCC)        Past Surgical History:   Procedure Laterality Date    EPIDURAL BLOCK INJECTION N/A 2/11/2021    Procedure: T9 T10 INTERLAMINAR THORACIC EPIDURAL STEROID INJECTION;  Surgeon: Rodri Ruiz MD;  [facility];  Service: Pain Management     FINGER SURGERY Right     5th digit    NERVE BLOCK Bilateral 1/21/2021    Procedure: T7 T8 T9 T10 MEDIAL BRANCH BLOCK #1;  Surgeon: Rodri Ruiz MD;  [facility];  Service: Pain Management     MO OPEN TX PHALANGEAL SHAFT FRACTURE PROX/MIDDLE " EA Left 9/15/2022    Procedure: Closed reducation w/percutaneous pinning left ring and small finger metacarpal fracture dislocations;  Surgeon: Sid Swenson MD;  Location:  [facility];  Service: Orthopedics    WISDOM TOOTH EXTRACTION         Family History   Problem Relation Age of Onset    Thyroid disease Mother     Heart disease Father     Hypertension Father     No Known Problems Brother     Cancer Maternal Grandmother     No Known Problems Maternal Grandfather     Parkinsonism Paternal Grandmother     Heart disease Paternal Grandfather     Other Son         blounts disease    No Known Problems Daughter     Seizures Neg Hx      I have reviewed and agree with the history as documented.    E-Cigarette/Vaping    E-Cigarette Use Never User      E-Cigarette/Vaping Substances     Social History     Tobacco Use    Smoking status: Former     Types: Cigarettes    Smokeless tobacco: Never   Vaping Use    Vaping status: Never Used   Substance Use Topics    Alcohol use: Not Currently    Drug use: Yes     Frequency: 7.0 times per week     Types: Marijuana     Comment: medical marijuana       Review of Systems   Constitutional:  Negative for fever.   Respiratory:  Negative for shortness of breath and wheezing.    Cardiovascular:  Positive for chest pain.   Gastrointestinal:  Negative for nausea and vomiting.   Hematological:  Positive for adenopathy.       Physical Exam  Physical Exam  Vitals and nursing note reviewed.   Constitutional:       General: He is not in acute distress.     Appearance: He is normal weight. He is not toxic-appearing.   HENT:      Head: Normocephalic.      Right Ear: External ear normal.      Left Ear: External ear normal.      Nose: Nose normal.   Pulmonary:      Effort: Pulmonary effort is normal. No respiratory distress.   Musculoskeletal:         General: No swelling.   Lymphadenopathy:      Upper Body:      Left upper body: Axillary adenopathy present.      Comments: Left axilla examined with  ultrasound.  There is no discernible mass or abscess that requires drainage.  There is some subtle lymph node presents in the area where the patient is feeling tenderness.   Skin:     Coloration: Skin is not jaundiced.      Findings: No bruising or lesion.   Neurological:      Mental Status: He is alert.         Vital Signs  ED Triage Vitals [08/11/24 0618]   Temperature Pulse Respirations Blood Pressure SpO2   97.7 °F (36.5 °C) 77 16 113/80 97 %      Temp Source Heart Rate Source Patient Position - Orthostatic VS BP Location FiO2 (%)   Temporal Monitor Sitting -- --      Pain Score       6           Vitals:    08/11/24 0618   BP: 113/80   Pulse: 77   Patient Position - Orthostatic VS: Sitting         Visual Acuity      ED Medications  Medications - No data to display    Diagnostic Studies  Results Reviewed       None                   No orders to display              Procedures  Procedures         ED Course                                 SBIRT 22yo+      Flowsheet Row Most Recent Value   Initial Alcohol Screen: US AUDIT-C     1. How often do you have a drink containing alcohol? 0 Filed at: 08/11/2024 0616   2. How many drinks containing alcohol do you have on a typical day you are drinking?  0 Filed at: 08/11/2024 0616   3a. Male UNDER 65: How often do you have five or more drinks on one occasion? 0 Filed at: 08/11/2024 0616   3b. FEMALE Any Age, or MALE 65+: How often do you have 4 or more drinks on one occassion? 0 Filed at: 08/11/2024 0616   Audit-C Score 0 Filed at: 08/11/2024 0616   CORA: How many times in the past year have you...    Used an illegal drug or used a prescription medication for non-medical reasons? Never Filed at: 08/11/2024 0616                      Medical Decision Making  Problems Addressed:  Lymphadenopathy: complicated acute illness or injury     Details: Recommendation is to try warm compresses and anti-inflammatory medications.  Continue antibiotics.  Follow-up with PCP as  needed                 Disposition  Final diagnoses:   Lymphadenopathy     Time reflects when diagnosis was documented in both MDM as applicable and the Disposition within this note       Time User Action Codes Description Comment    8/11/2024  6:45 AM Anselmo Felix Add [R59.1] Lymphadenopathy           ED Disposition       ED Disposition   Discharge    Condition   Stable    Date/Time   Sun Aug 11, 2024 0645    Comment   Pj Schaffer discharge to home/self care.                   Follow-up Information       Follow up With Specialties Details Why Contact Info    Cristiana Loving MD Family Medicine In 1 week If not better [address]  [address]  [facility] [address] [phone]              Patient's Medications   Discharge Prescriptions    No medications on file       No discharge procedures on file.    PDMP Review         Value Time User    PDMP Reviewed  Yes 4/8/2023 11:50 AM Anselmo Felix DO            ED Provider  Electronically Signed by             Anselmo Felix DO  08/11/24 0649

## (undated) DEVICE — GAUZE SPONGES,USP TYPE VII GAUZE, 12 PLY: Brand: CURITY

## (undated) DEVICE — TELFA ADHESIVE ISLAND DRESSING: Brand: TELFA

## (undated) DEVICE — DRAPE SHEET THREE QUARTER

## (undated) DEVICE — NEEDLE SPINAL 22G X 3.5IN  QUINCKE

## (undated) DEVICE — CHLORAPREP HI-LITE 10.5ML ORANGE

## (undated) DEVICE — GLOVE INDICATOR PI UNDERGLOVE SZ 8 BLUE

## (undated) DEVICE — GLOVE SRG BIOGEL 7

## (undated) DEVICE — SYRINGE 5ML LL

## (undated) DEVICE — SYRINGE 10ML LL

## (undated) DEVICE — NEEDLE 25G X 1 1/2

## (undated) DEVICE — STERILE BETHLEHEM PLASTIC HAND: Brand: CARDINAL HEALTH

## (undated) DEVICE — CUFF TOURNIQUET 18 X 4 IN QUICK CONNECT DISP 1 BLADDER

## (undated) DEVICE — GLOVE SRG LF STRL BGL SKNSNS 7.5 PF

## (undated) DEVICE — GLOVE INDICATOR PI UNDERGLOVE SZ 6.5 BLUE

## (undated) DEVICE — NEEDLE 18 G X 1 1/2 SAFETY

## (undated) DEVICE — IV EXTENSION TUBING SMALL BORE

## (undated) DEVICE — SPLINT 5 X 30 IN XFAST SET PLASTER

## (undated) DEVICE — GLOVE SRG BIOGEL 6.5

## (undated) DEVICE — DRAPE C-ARM 48 X 84 IN F/ OEC MINIVIEW 6800

## (undated) DEVICE — UTILITY MARKER,BLACK WITH LABELS: Brand: DEVON

## (undated) DEVICE — GLOVE SRG BIOGEL 8

## (undated) DEVICE — SYRINGE LOR 8ML PLASTIC LL

## (undated) DEVICE — PLASTIC ADHESIVE BANDAGE: Brand: CURITY

## (undated) DEVICE — TRAY EPID CONT PERIFIX 18G X 3.5IN 10ML CLSD TIP

## (undated) DEVICE — DRAPE TOWEL: Brand: CONVERTORS

## (undated) DEVICE — STANDARD SURGICAL GOWN, L: Brand: CONVERTORS

## (undated) DEVICE — SKIN MARKER DUAL TIP WITH RULER CAP, FLEXIBLE RULER AND LABELS: Brand: DEVON

## (undated) DEVICE — STERILE LATEX POWDER-FREE SURGICAL GLOVESWITH NITRILE COATING: Brand: PROTEXIS

## (undated) DEVICE — SPONGE PVP SCRUB WING STERILE

## (undated) DEVICE — GLOVE SRG BIOGEL 7.5